# Patient Record
Sex: FEMALE | Race: WHITE | NOT HISPANIC OR LATINO | Employment: UNEMPLOYED | URBAN - METROPOLITAN AREA
[De-identification: names, ages, dates, MRNs, and addresses within clinical notes are randomized per-mention and may not be internally consistent; named-entity substitution may affect disease eponyms.]

---

## 2017-09-06 ENCOUNTER — TELEPHONE (OUTPATIENT)
Dept: GYNECOLOGIC ONCOLOGY | Facility: CLINIC | Age: 42
End: 2017-09-06

## 2017-09-06 NOTE — TELEPHONE ENCOUNTER
GYN Oncology    Telephone Call    I received a call from Rosa Larsen at [facility] regarding this patient.  She has an angiosarcoma of the breast and in the course of initial imaging, a PET scans showed several very avid pelvic masses.  She would like to refer the patient for evaluation.    I gave the information to my .  They will work her in within the next several days.    Sunshine Jones MD  09/06/17  8:56 AM

## 2017-09-07 ENCOUNTER — OFFICE VISIT (OUTPATIENT)
Dept: GYNECOLOGIC ONCOLOGY | Facility: CLINIC | Age: 42
End: 2017-09-07

## 2017-09-07 VITALS
DIASTOLIC BLOOD PRESSURE: 93 MMHG | OXYGEN SATURATION: 98 % | HEART RATE: 84 BPM | TEMPERATURE: 98.2 F | HEIGHT: 64 IN | RESPIRATION RATE: 14 BRPM | WEIGHT: 127 LBS | SYSTOLIC BLOOD PRESSURE: 172 MMHG | BODY MASS INDEX: 21.68 KG/M2

## 2017-09-07 DIAGNOSIS — N94.89 ADNEXAL MASS: ICD-10-CM

## 2017-09-07 DIAGNOSIS — C50.912 ANGIOSARCOMA OF FEMALE BREAST, LEFT (HCC): Primary | ICD-10-CM

## 2017-09-07 DIAGNOSIS — N85.8 UTERINE MASS: ICD-10-CM

## 2017-09-07 PROCEDURE — 58100 BIOPSY OF UTERUS LINING: CPT | Performed by: OBSTETRICS & GYNECOLOGY

## 2017-09-07 PROCEDURE — 88305 TISSUE EXAM BY PATHOLOGIST: CPT | Performed by: OBSTETRICS & GYNECOLOGY

## 2017-09-07 PROCEDURE — 99205 OFFICE O/P NEW HI 60 MIN: CPT | Performed by: OBSTETRICS & GYNECOLOGY

## 2017-09-07 RX ORDER — PANTOPRAZOLE SODIUM 40 MG/1
40 TABLET, DELAYED RELEASE ORAL DAILY
COMMUNITY
Start: 2017-08-25

## 2017-09-07 NOTE — PROGRESS NOTES
Marianne Rodriguez  [id number]  1975    Subjective     Reason for visit:  Patient is being seen at the request of Dr. Larsen for abnormal pelvic findings on PET in the setting of work-up for breast cancer    History of present illness:  The patient is a 42 y.o. female with abnormal pelvic findings on PET in the setting of work-up for breast cancer.  She presented in August 2017 with a palpable mass in the left breast.  Imaging revealed a 4 cm mass in the left upper outer breast.  Biopsy returned showing angiosarcoma.  PET scan showed clear lungs.  Known left breast mass with an SUV of 2.  No hypermetabolic abdominal or pelvic lymphadenopathy.  Incidental finding of a hypermetabolic lesion in the uterus with an SUV of 5 with fullness of the uterus seen on noncontrast images with a vague area of low density.  A poorly defined soft tissue density was seen in the right pelvis with an SUV of 8.  This could be related to the right ovary.  Additional pelvic imaging was recommended.  She was referred to GYN Oncology.    Today she endorses the above noted history.  She has regular menses.  No current intermenstrual bleeding.  Has chronic menorrhagia that is unchanged.  Minimal dysmenorrhea.  She does have some minimal right-sided pelvic pain.  Denies nausea vomiting.  Normal bowel and bladder function.  Normal energy and appetite.  Stable weight.  No chest pain or shortness of breath.  Changes in her skin.    OBGYN History: NSVDx2.  She underwent a LEEP in 7/2017 with Dr. Spicer for a pre-op diagnosis of CIN3.  Final pathology returned showing JUAQUIN 2-3 with focal dysplasia at the margins.  ECC was benign.    Past Medical History:   Diagnosis Date   • Breast cancer    • Current every day smoker    • GERD (gastroesophageal reflux disease)        Past Surgical History:   Procedure Laterality Date   • LAPAROSCOPIC CHOLECYSTECTOMY  2012   • TUBAL ABDOMINAL LIGATION  2002       MEDICATIONS: The current medication list was  reviewed with the patient and updated in the EMR this date per the Medical Assistant. Medication dosages and frequencies were confirmed to be accurate.      Allergies:  has No Known Allergies.    Social History: She lives in [place].  She is a student.  She has a boyfriend.  +prior 1 ppd smoker, now down to 4-5 cigs/day.  She denies use of alcohol, and illicit drugs.     Family History:  +Prostate cancer in her father at 66.  Denies a history of breast, colon, endometrial, and ovarian cancer.  No history of melanoma.    Health Maintenance:    Colonoscopy - never    Review of Systems:  CONSTITUTIONAL:  Weight has been stable.  No excessive fatigue.  No fever.  No chills, night sweats.  PSYCHIATRIC: +anxiety.  +depression. No bipolar disorder or insomnia.  EYES: Vision is unchanged.  No photophobia.  EARS, NOSE, MOUTH, AND THROAT: Hearing normal. No swallowing difficulties.  No sore throat.  ENDOCRINE: No history of diabetes.  No history of thyroid disease.  LYMPHATIC: No enlarged lymph nodes  RESPIRATORY: No shortness of breath, cough, asthma or wheezing.  No hemoptysis.  CARDIOVASCULAR: No angina, orthopnea.  No edema.  No HTN.  No hyperlipidemia.  GASTROINTESTINAL: No constipation.  No diarrhea.  No melena. +reflux.  No nausea.  No vomiting.  GENITOURINARY: No dysuria, hematuria, urgency, or frequency.  NEUROLOGIC: No numbness, weakness, motor disturbance, syncope, seizure, or headaches.  MUSCULOSKELETAL: No joint pain.  No muscle weakness.  INTEGUMENTARY: No new skin lesions.  GYNECOLOGIC: Per history of present illness.  HEMATOLOGIC: No bleeding problems.  Denies easy bruising.    Objective      Physical Exam  Vitals:    09/07/17 1404   BP: 172/93   Pulse: 84   Resp: 14   Temp: 98.2 °F (36.8 °C)   SpO2: 98%     Body mass index is 22.14 kg/(m^2).    Wt Readings from Last 3 Encounters:   09/07/17 127 lb (57.6 kg)     ECOG PS 0    GENERAL: Alert, well-appearing female in no apparent distress.  She appears her  stated age.  She is here by herself.  HEENT: Sclera anicteric, normal eyelids. Head normocephalic, atraumatic. Mucus membranes moist.  Normal dentition.    NECK: Trachea midline, supple, without masses.  No thyromegaly.      BREASTS: Deferred  CARDIOVASCULAR: Normal rate, regular rhythm, no murmurs, rubs, or gallops.  Extremities symmetric.  No peripheral edema.  RESPIRATORY: Clear to auscultation bilaterally, normal respiratory effort  GASTROINTESTINAL:  Soft, non-tender, non-distended, no rebound or guarding, no masses, or hernias.  No HSM.    MUSCULOSKELETAL:  Normal gait and station.  FROMx4.  No digital cyanosis.    SKIN:  Warm, dry, well-perfused.  All visible areas intact.  No rashes, lesions, ulcers.  PSYCHIATRIC: AO x3, with appropriate affect, normal thought processes  LYMPHATICS:  No cervical or inguinal adenopathy noted.  PELVIC exam:  Normal external female genitalia without lesions or skin changes.  Urethra midline.  Vagina without lesions.  Cervix 2 cm and without visible lesions and healing well from the LEEP.  On bimanual exam vagina and cervix are smooth and without nodularity.  No CMT.  Uterus is of normal, size, shape, and contour. No palpable abdominal masses. Rectovaginal exam confirmatory. The pelvic sidewalls are smooth, the cul de sac is clear, the rectovaginal septum is supple.  EMB Procedure:  Informed consent was obtained.  A timeout was performed for patient safety.  The cervix was prepped with Betadine and grasped at the anterior lip with a single tooth tenaculum.  Endometrial pipelle was inserted and uterus sounded to 7 cm.  An adequate but small amount of tissue was obtained on two passes and specimen was handed off to pathology.  Tenaculum was removed and hemostasis was noted.  Patient tolerated the procedure well.      Diagnostic Data:    OSH path and imaging reviewed    I personally reviewed her outside hospital PET images and will have them loaded into the system.  I concur that  there are no upper abdominal abnormalities.  There are no sagittal views of the uterus.  I reviewed the axial and coronal data sats.  On some images it appears as though the hypermetabolic activity is at the fundal portion of the endometrial cavity, and in others it appears to be in the myometrium of the fundus.  The hypermetabolism in the right adnexal region is not clearly adnexal and could be adenopathy in the iliac region.  Uterus overall does not appear enlarged.  Cervix is normal without any uptake.  The left adnexa is normal.      Assessment/Plan  This is a 42 y.o. woman with a new diagnosis of angiosarcoma of the left breast with PET scan revealing incidental findings in the pelvis of hypermetabolism in the uterus and right adnexal/right pelvic region    1.  Pelvic findings  -We reviewed the difficulty in evaluating the uterine abnormality short of surgery.  I did perform an EMB today however this is more likely a myometrial lesion and therefore not accessible by EMB.  Additionally clinically she's had no abnormal bleeding.  Will follow-up on the EMB results nonetheless.  MRI really would be best for characterizing this likely myometrial lesion.  -In terms of the right pelvic lesion, again not incredibly well imaged on CT, given that I am concerned that this could be iliac mo disease as opposed to an abnormal ovary, I strongly advise MRI over pelvic ultrasound and have ordered this today to be performed as soon as possible.    -Pending these results I also placed an order for a CA-125 to be drawn at some point.    -I explained that depending on the findings of these initial studies, we do not typically pursue interventions short of hysterectomy or oophorectomy for definitive evaluation.  In short, we do not biopsy ovaries or the uterus, but that oophorectomy and hysterectomy are the biopsy so to speak.  I the explained that at times uterine sarcomas can metastasize to the breast, although this is  typically something confined to case reports and is very uncommon.  Nonetheless I concurred that the pelvis should be excluded as the primary site, and even independently should be managed as concerning for a neoplastic process in and of itself.    2.  Breast cancer  -In reviewing Dr. Larsen's notes, typically neoadjuvant chemotherapy would be pursued followed by resection provided a good response on interval imaging.  -Treatment initiation will be on hold while the GYN issues are being evaluated.    -She may proceed with interval port placement if desired while the workup is unfolding    3.  Follow up:  -I will call her with her biopsy and MRI results and we can continue treatment planning  -I will follow-up on the loading of her PET images.  -My hope is to have a plan by early/mid next week        Electronically Signed by: Sunshine Jones MD  Date: 9/7/2017      Time:  5:30 PM    Note: Speech recognition transcription software was used to dictate portions of this document.  An attempt at proofreading has been made though minor errors in transcription may still be present.  Please do not hesitate to call our office with any questions.

## 2017-09-11 ENCOUNTER — HOSPITAL ENCOUNTER (OUTPATIENT)
Dept: MRI IMAGING | Facility: HOSPITAL | Age: 42
Discharge: HOME | End: 2017-09-11
Attending: OBSTETRICS & GYNECOLOGY | Admitting: OBSTETRICS & GYNECOLOGY

## 2017-09-11 DIAGNOSIS — N94.89 ADNEXAL MASS: ICD-10-CM

## 2017-09-11 DIAGNOSIS — C50.912 ANGIOSARCOMA OF FEMALE BREAST, LEFT (HCC): ICD-10-CM

## 2017-09-11 DIAGNOSIS — N85.8 UTERINE MASS: ICD-10-CM

## 2017-09-11 LAB
CYTO UR: NORMAL
LAB AP CASE REPORT: NORMAL
LAB AP CLINICAL INFORMATION: NORMAL
Lab: NORMAL
PATH REPORT.FINAL DX SPEC: NORMAL
PATH REPORT.GROSS SPEC: NORMAL

## 2017-09-11 PROCEDURE — A9577 INJ MULTIHANCE: HCPCS | Performed by: OBSTETRICS & GYNECOLOGY

## 2017-09-11 PROCEDURE — 72197 MRI PELVIS W/O & W/DYE: CPT

## 2017-09-11 PROCEDURE — 0 GADOBENATE DIMEGLUMINE 529 MG/ML SOLUTION: Performed by: OBSTETRICS & GYNECOLOGY

## 2017-09-11 RX ADMIN — GADOBENATE DIMEGLUMINE 10 ML: 529 INJECTION, SOLUTION INTRAVENOUS at 15:00

## 2017-09-12 ENCOUNTER — TELEPHONE (OUTPATIENT)
Dept: GYNECOLOGIC ONCOLOGY | Facility: CLINIC | Age: 42
End: 2017-09-12

## 2017-09-12 NOTE — TELEPHONE ENCOUNTER
GYN Oncology    Telephone Call    I personally reviewed the PET and MRI images with Dr. Bauer of Radiology.  We feel the uptake in the endometrium is likely physiologic given the patient's age.  Fortunately the lesion in the right adnexa is an involuting cyst well demonstrated on MRI.  The endometrium and myometrium are nonenhancing on MRI.    I recommend she proceed with her breast cancer treatment as planned.  No additional gynecologic evaluation is required apart from routine annual care.    MRI 9/11/17  FINDINGS: The uterus is of normal size. There is no uterine mass. The  endometrium is normal. There is no adnexal mass. There is no cul-de-sac  fluid. There is no pelvic mass or adenopathy. There is no abnormal  contrast enhancement.    IMPRESSION:  Normal examination.    9/7/17 EMB  Final Diagnosis    ENDOMETRIAL CURETTINGS:  Mid-secretory phase endometrium (day 20-21).  Negative for hyperplasia or malignancy.     I left the patient a voicemail to call me to discuss these results and my recommendations.    Sunshine Jones MD  09/12/17  10:25 AM      Addendum  The patient returned my call and we discussed the above plan and all questions were answered.      Sunshine Jones MD  09/12/17  1:52 PM

## 2019-05-22 ENCOUNTER — CONSULT (OUTPATIENT)
Dept: ONCOLOGY | Facility: CLINIC | Age: 44
End: 2019-05-22

## 2019-05-22 VITALS
DIASTOLIC BLOOD PRESSURE: 68 MMHG | SYSTOLIC BLOOD PRESSURE: 136 MMHG | WEIGHT: 115 LBS | HEART RATE: 80 BPM | HEIGHT: 64 IN | TEMPERATURE: 97.8 F | RESPIRATION RATE: 16 BRPM | BODY MASS INDEX: 19.63 KG/M2 | OXYGEN SATURATION: 100 %

## 2019-05-22 DIAGNOSIS — C50.912 ANGIOSARCOMA OF FEMALE BREAST, LEFT (HCC): Primary | ICD-10-CM

## 2019-05-22 DIAGNOSIS — C50.912 ANGIOSARCOMA OF LEFT FEMALE BREAST (HCC): ICD-10-CM

## 2019-05-22 PROCEDURE — 99205 OFFICE O/P NEW HI 60 MIN: CPT | Performed by: INTERNAL MEDICINE

## 2019-05-22 RX ORDER — DULOXETIN HYDROCHLORIDE 60 MG/1
CAPSULE, DELAYED RELEASE ORAL
Refills: 1 | COMMUNITY
Start: 2019-05-20

## 2019-05-22 RX ORDER — GABAPENTIN 300 MG/1
600 CAPSULE ORAL 3 TIMES DAILY
Qty: 180 CAPSULE | Refills: 3 | Status: SHIPPED | OUTPATIENT
Start: 2019-05-22 | End: 2019-09-13 | Stop reason: SDUPTHER

## 2019-05-22 RX ORDER — HYDROXYZINE HYDROCHLORIDE 25 MG/1
TABLET, FILM COATED ORAL
Refills: 1 | COMMUNITY
Start: 2019-05-20

## 2019-05-22 RX ORDER — ALBUTEROL SULFATE 90 UG/1
AEROSOL, METERED RESPIRATORY (INHALATION)
Refills: 0 | COMMUNITY
Start: 2019-05-14

## 2019-05-22 RX ORDER — LANOLIN ALCOHOL/MO/W.PET/CERES
1000 CREAM (GRAM) TOPICAL DAILY
Refills: 11 | COMMUNITY
Start: 2019-05-13

## 2019-05-22 RX ORDER — LISINOPRIL 20 MG/1
20 TABLET ORAL DAILY
Refills: 5 | COMMUNITY
Start: 2019-05-13 | End: 2019-06-18 | Stop reason: HOSPADM

## 2019-05-22 NOTE — PROGRESS NOTES
Subjective     PROBLEM LIST:  1. Angiosarcoma of the left breast  A) presented with a palpable mass on August 2017.  Mammogram 8/14/2017 showed a 2 cm left upper outer quadrant mass.  Biopsy was positive for angiosarcoma.  A breast MRI showed a 4.3 cm vascular mass.  A PET CT was done which showed abnormal uptake in the pelvis and uterus.  She was evaluated by GYN oncology with an endometrial biopsy and MRI, all of which was benign.  She underwent a left mastectomy.  Lymph nodes were uninvolved.  She received adjuvant chemotherapy with adriamycin and ifosfamide and chest wall radiation.  2.  Cervical dysplasia  3.  Gastroesophageal reflux disease    CHIEF COMPLAINT: angiosarcoma of the breast      HISTORY OF PRESENT ILLNESS:  The patient is a 43 y.o. year old female, referred for ongoing care of a history of angiosarcoma of the left breast.  Her prior treatment is as detailed above.   Since then she has been followed by Dr. Avila with scans every 3 months.     In October 2018 she had a CT scan of the chest, abdomen, and pelvis.  This was unremarkable without evidence of metastatic disease.  She does have a 1.3 cm right adrenal nodule which has been stable since 2017.    She reports that she missed too many appointments and was discharged from the prior practice.      She has neuropathy in her hands and feet and takes gabapentin for this.  She needs a refill today.      She is not working and she lives with her parents.    She recently had an MRI at  for chronic back pain and was told that there was an abnormality in her pelvis; she thinks a bone scan was recommended.    REVIEW OF SYSTEMS:  A 14 point review of systems was performed and is negative except as noted above.    Past Medical History:   Diagnosis Date   • Anxiety and depression    • Breast cancer (CMS/HCC)     Angisarcoma of the breast ~ Aug/Sept 2017 staus post Chemotherapy/Radiationtherapy   • Current every day smoker    • Gallstones    • GERD  "(gastroesophageal reflux disease)    • Hypertension          Current Outpatient Medications on File Prior to Visit   Medication Sig Dispense Refill   • albuterol sulfate  (90 Base) MCG/ACT inhaler INL 2 INHALATIONS PO QID PRF WHZ  0   • Cholecalciferol (VITAMIN D3) 5000 units capsule capsule TK 1 C PO QD  11   • DULoxetine (CYMBALTA) 60 MG capsule TK ONE C PO QD  1   • hydrOXYzine (ATARAX) 25 MG tablet TK 1 T PO QHS  1   • lisinopril (PRINIVIL,ZESTRIL) 20 MG tablet Take  by mouth Daily.  5   • pantoprazole (PROTONIX) 40 MG EC tablet      • vitamin B-12 (CYANOCOBALAMIN) 1000 MCG tablet Take 1,000 mcg by mouth Daily.  11     No current facility-administered medications on file prior to visit.        No Known Allergies    Past Surgical History:   Procedure Laterality Date   • LAPAROSCOPIC CHOLECYSTECTOMY  2012   • LEEP  05/2017   • MASTECTOMY Left 09/2017   • TUBAL ABDOMINAL LIGATION  2002       Social History     Socioeconomic History   • Marital status:      Spouse name: Not on file   • Number of children: Not on file   • Years of education: Not on file   • Highest education level: Not on file   Tobacco Use   • Smoking status: Current Every Day Smoker     Packs/day: 0.50     Years: 20.00     Pack years: 10.00     Types: Cigarettes   • Smokeless tobacco: Never Used   Substance and Sexual Activity   • Alcohol use: No   • Drug use: No   • Sexual activity: Defer       Family History   Problem Relation Age of Onset   • Hypertension Mother    • Hyperlipidemia Mother    • Emphysema Mother    • Hodgkin's lymphoma Father         Non   • Prostate cancer Father    • Bone cancer Father        Objective     /68 Comment: RUE  Pulse 80   Temp 97.8 °F (36.6 °C) (Temporal)   Resp 16   Ht 161.3 cm (63.5\")   Wt 52.2 kg (115 lb)   SpO2 100% Comment: RA  BMI 20.05 kg/m²   Performance Status: 0  General: well appearing female in no acute distress  Neuro: alert and oriented  HEENT: sclera anicteric, oropharynx " clear  Lymphatics: no cervical, supraclavicular, or axillary adenopathy  Chest: s/p left mastectomy, chest tender, no palpable masses  Cardiovascular: regular rate and rhythm, no murmurs  Lungs: clear to auscultation bilaterally  Abdomen: soft, nontender, nondistended.  No palpable organomegaly  Extremeties: no lower extremity edema  Skin: no rashes, lesions, bruising, or petechiae  Psych: mood and affect appropriate        Imaging: I personally reviewed her prior PET/CT from 2017, MRI pelvis from 2017, and recent MRI lumbar spine, and also reviewed images with radiology.  There are some bilateral pelvic bone lesions that are enhancing, and also a cystic appearing mass in the right pelvic sidewall.    Assessment/Plan     Marianne Rodriguez is a 43 y.o. year old female with a history of angiosarcoma of the left breast.  She is here today to transfer care.    There are some concerning findings on her recent MRI of the lumbar spine.  There are some pelvic bone lesions which could represent metastases, and were not present on her imaging from 2 years ago.  There is also a cystic lesion in the right pelvic sidewall of uncertain significance.  I will order a CT scan of the chest abdomen and pelvis as well as a bone scan for further evaluation of this.  Her previous tumor was not extremely FDG avid, so it is uncertain that a PET scan would be beneficial.    I discussed all of the above with Ms. Rodriguez during and after her visit.  I will contact her when the results of the scans are available and we will make further plans from there.    Visit time 60 minutes , 45 minutes spent in counseling patient on imaging findings, review of imaging with other physicians, and discussion of plans.           Charlotte Mann MD    5/22/2019

## 2019-05-31 ENCOUNTER — HOSPITAL ENCOUNTER (OUTPATIENT)
Dept: CT IMAGING | Facility: HOSPITAL | Age: 44
Discharge: HOME | End: 2019-05-31
Admitting: INTERNAL MEDICINE

## 2019-05-31 ENCOUNTER — HOSPITAL ENCOUNTER (OUTPATIENT)
Dept: NUCLEAR MEDICINE | Facility: HOSPITAL | Age: 44
Discharge: HOME | End: 2019-05-31

## 2019-05-31 DIAGNOSIS — C50.912 ANGIOSARCOMA OF FEMALE BREAST, LEFT (HCC): ICD-10-CM

## 2019-05-31 PROCEDURE — 0 TECHNETIUM MEDRONATE KIT: Performed by: INTERNAL MEDICINE

## 2019-05-31 PROCEDURE — 82565 ASSAY OF CREATININE: CPT

## 2019-05-31 PROCEDURE — 78306 BONE IMAGING WHOLE BODY: CPT

## 2019-05-31 PROCEDURE — 25010000002 IOPAMIDOL 61 % SOLUTION: Performed by: INTERNAL MEDICINE

## 2019-05-31 PROCEDURE — 74177 CT ABD & PELVIS W/CONTRAST: CPT

## 2019-05-31 PROCEDURE — A9503 TC99M MEDRONATE: HCPCS | Performed by: INTERNAL MEDICINE

## 2019-05-31 PROCEDURE — 71260 CT THORAX DX C+: CPT

## 2019-05-31 RX ORDER — TC 99M MEDRONATE 20 MG/10ML
26.2 INJECTION, POWDER, LYOPHILIZED, FOR SOLUTION INTRAVENOUS
Status: COMPLETED | OUTPATIENT
Start: 2019-05-31 | End: 2019-05-31

## 2019-05-31 RX ADMIN — IOPAMIDOL 75 ML: 612 INJECTION, SOLUTION INTRAVENOUS at 12:16

## 2019-05-31 RX ADMIN — Medication 26.2 MILLICURIE: at 11:15

## 2019-06-04 ENCOUNTER — TELEPHONE (OUTPATIENT)
Dept: ONCOLOGY | Facility: CLINIC | Age: 44
End: 2019-06-04

## 2019-06-04 DIAGNOSIS — R19.00 PELVIC MASS: ICD-10-CM

## 2019-06-04 DIAGNOSIS — C50.912 ANGIOSARCOMA OF LEFT FEMALE BREAST (HCC): Primary | ICD-10-CM

## 2019-06-04 NOTE — TELEPHONE ENCOUNTER
Called patient regarding scan results.  The scans are fairly unremarkable other than a approximately 5 cm right pelvic sidewall mass.  Discussed with Dr. oJ.  He will see her tomorrow for further evaluation.

## 2019-06-05 LAB — CREAT BLDA-MCNC: 0.7 MG/DL (ref 0.6–1.3)

## 2019-06-11 ENCOUNTER — APPOINTMENT (OUTPATIENT)
Dept: PREADMISSION TESTING | Facility: HOSPITAL | Age: 44
End: 2019-06-11

## 2019-06-11 VITALS — HEIGHT: 64 IN | WEIGHT: 117.06 LBS | BODY MASS INDEX: 19.99 KG/M2

## 2019-06-11 LAB
ABO GROUP BLD: NORMAL
ANION GAP SERPL CALCULATED.3IONS-SCNC: 14 MMOL/L
BLD GP AB SCN SERPL QL: NEGATIVE
BUN BLD-MCNC: 11 MG/DL (ref 6–20)
BUN/CREAT SERPL: 20.4 (ref 7–25)
CALCIUM SPEC-SCNC: 9.5 MG/DL (ref 8.6–10.5)
CHLORIDE SERPL-SCNC: 101 MMOL/L (ref 98–107)
CO2 SERPL-SCNC: 26 MMOL/L (ref 22–29)
CREAT BLD-MCNC: 0.54 MG/DL (ref 0.57–1)
DEPRECATED RDW RBC AUTO: 52.5 FL (ref 37–54)
ERYTHROCYTE [DISTWIDTH] IN BLOOD BY AUTOMATED COUNT: 14.6 % (ref 12.3–15.4)
GFR SERPL CREATININE-BSD FRML MDRD: 124 ML/MIN/1.73
GLUCOSE BLD-MCNC: 96 MG/DL (ref 65–99)
HBA1C MFR BLD: 5.1 % (ref 4.8–5.6)
HCT VFR BLD AUTO: 38.2 % (ref 34–46.6)
HGB BLD-MCNC: 11.6 G/DL (ref 12–15.9)
MCH RBC QN AUTO: 29.7 PG (ref 26.6–33)
MCHC RBC AUTO-ENTMCNC: 30.4 G/DL (ref 31.5–35.7)
MCV RBC AUTO: 97.7 FL (ref 79–97)
PLATELET # BLD AUTO: 518 10*3/MM3 (ref 140–450)
PMV BLD AUTO: 8.6 FL (ref 6–12)
POTASSIUM BLD-SCNC: 4.5 MMOL/L (ref 3.5–5.2)
RBC # BLD AUTO: 3.91 10*6/MM3 (ref 3.77–5.28)
RH BLD: POSITIVE
SODIUM BLD-SCNC: 141 MMOL/L (ref 136–145)
T&S EXPIRATION DATE: NORMAL
WBC NRBC COR # BLD: 10.27 10*3/MM3 (ref 3.4–10.8)

## 2019-06-11 PROCEDURE — 85027 COMPLETE CBC AUTOMATED: CPT | Performed by: SURGERY

## 2019-06-11 PROCEDURE — 83036 HEMOGLOBIN GLYCOSYLATED A1C: CPT | Performed by: SURGERY

## 2019-06-11 PROCEDURE — 86850 RBC ANTIBODY SCREEN: CPT | Performed by: SURGERY

## 2019-06-11 PROCEDURE — 80048 BASIC METABOLIC PNL TOTAL CA: CPT | Performed by: SURGERY

## 2019-06-11 PROCEDURE — 93005 ELECTROCARDIOGRAM TRACING: CPT

## 2019-06-11 PROCEDURE — 86901 BLOOD TYPING SEROLOGIC RH(D): CPT | Performed by: SURGERY

## 2019-06-11 PROCEDURE — 86900 BLOOD TYPING SEROLOGIC ABO: CPT | Performed by: SURGERY

## 2019-06-11 PROCEDURE — 93010 ELECTROCARDIOGRAM REPORT: CPT | Performed by: INTERNAL MEDICINE

## 2019-06-11 PROCEDURE — 36415 COLL VENOUS BLD VENIPUNCTURE: CPT

## 2019-06-11 RX ORDER — PREGABALIN 75 MG/1
75 CAPSULE ORAL ONCE
Status: CANCELLED | OUTPATIENT
Start: 2019-06-11 | End: 2019-06-11

## 2019-06-11 RX ORDER — ACETAMINOPHEN 500 MG
1000 TABLET ORAL ONCE
Status: CANCELLED | OUTPATIENT
Start: 2019-06-11 | End: 2019-06-11

## 2019-06-11 RX ORDER — SCOLOPAMINE TRANSDERMAL SYSTEM 1 MG/1
1 PATCH, EXTENDED RELEASE TRANSDERMAL CONTINUOUS
Status: CANCELLED | OUTPATIENT
Start: 2019-06-11 | End: 2019-06-14

## 2019-06-11 RX ORDER — MELOXICAM 15 MG/1
15 TABLET ORAL ONCE
Status: CANCELLED | OUTPATIENT
Start: 2019-06-11 | End: 2019-06-11

## 2019-06-11 NOTE — DISCHARGE INSTRUCTIONS
The following information and instructions were given:    Do not eat, drink, smoke or chew gum after midnight the night before surgery. This also includes no mints.  Take all routine, prescribed medications including heart and blood pressure medicines with a sip of water unless otherwise instructed by your physician.   Do NOT take diabetic medication unless instructed by your physician.    If you were instructed to drink 20 ounces (or until full) of Gatorade or G2 (if diabetic) the morning of surgery, the Gatorade or G2 must be completed 1 hour before arrival time on the day of surgery .  No RED Gatorade or G2.      DO NOT shave for two days before your procedure.  Do not wear makeup.      DO NOT wear fingernail polish (gel/regular) and/or acrylic/artificial nails on the day of surgery.   If you have had a recent manicure and would rather not remove polish or artificial nails, then the minimum requirement is that the polish/artificial nails must be removed from the middle finger on each hand.      If you are having surgery/procedure on an upper extremity, then fingernail polish/artificial fingernails must be removed for surgery.  NO EXCEPTIONS.      If you are having surgery/procedure on a lower extremity, then toenail polish on both extremities must be removed for surgery.  NO EXCEPTIONS.    Remove all jewelry (advise to go to jeweler if unable to remove).  Jewelry, especially rings, can no longer be taped for surgery.    Leave anything you consider valuable at home.    Leave your suitcase in the car until after your surgery.    Bring the following with you the day of your procedure (when applicable)       -picture ID and insurance cards       -Co-pay/deductible required by insurance       -Medications in the original bottles (not a list) including all over-the-counter medications if not brought to PAT       -Copy of advance directive, living will or power of  documents if not brought to PAT       -CPAP or  BIPAP mask and tubing (do not bring machine)       -Skin prep instruction(s) sheet       -PAT Pass    Education booklet, brochure, handout or binder related to procedure given to patient.    When applicable, an ERAS booklet was given to patient.    Pain Control After Surgery handout given to patient.    Respirex use (handout given to patient) and pneumonia prevention education provided.    Signs and Symptoms of infection discussed.    DVT Prevention education given.  Stressing the importance of ambulation.    Please apply Chlorhexadine wipes to surgical area (if instructed) the night before procedure and the AM of procedure and document date/time of applications on skin prep instruction sheet.        Abdominal mass resection

## 2019-06-12 ENCOUNTER — ANESTHESIA EVENT (OUTPATIENT)
Dept: PERIOP | Facility: HOSPITAL | Age: 44
End: 2019-06-12

## 2019-06-12 RX ORDER — FAMOTIDINE 10 MG/ML
20 INJECTION, SOLUTION INTRAVENOUS ONCE
Status: CANCELLED | OUTPATIENT
Start: 2019-06-12 | End: 2019-06-12

## 2019-06-13 ENCOUNTER — HOSPITAL ENCOUNTER (INPATIENT)
Facility: HOSPITAL | Age: 44
LOS: 5 days | Discharge: HOME | End: 2019-06-18
Attending: SURGERY | Admitting: SURGERY

## 2019-06-13 ENCOUNTER — ANESTHESIA (OUTPATIENT)
Dept: PERIOP | Facility: HOSPITAL | Age: 44
End: 2019-06-13

## 2019-06-13 DIAGNOSIS — R19.00 RETROPERITONEAL MASS: ICD-10-CM

## 2019-06-13 PROBLEM — C78.6 SECONDARY MALIGNANT NEOPLASM OF RETROPERITONEUM AND PERITONEUM (HCC): Status: ACTIVE | Noted: 2019-06-13

## 2019-06-13 LAB
B-HCG UR QL: NEGATIVE
INTERNAL NEGATIVE CONTROL: NEGATIVE
INTERNAL POSITIVE CONTROL: POSITIVE
Lab: NORMAL

## 2019-06-13 PROCEDURE — 25010000002 HYDROMORPHONE PER 4 MG: Performed by: ANESTHESIOLOGY

## 2019-06-13 PROCEDURE — 25010000002 HEPARIN (PORCINE) PER 1000 UNITS: Performed by: SURGERY

## 2019-06-13 PROCEDURE — 25010000002 PROPOFOL 10 MG/ML EMULSION: Performed by: NURSE ANESTHETIST, CERTIFIED REGISTERED

## 2019-06-13 PROCEDURE — 25010000003 CEFAZOLIN IN DEXTROSE 2-4 GM/100ML-% SOLUTION: Performed by: SURGERY

## 2019-06-13 PROCEDURE — 25010000002 NEOSTIGMINE 10 MG/10ML SOLUTION: Performed by: NURSE ANESTHETIST, CERTIFIED REGISTERED

## 2019-06-13 PROCEDURE — 25010000002 PHENYLEPHRINE PER 1 ML: Performed by: NURSE ANESTHETIST, CERTIFIED REGISTERED

## 2019-06-13 PROCEDURE — 94640 AIRWAY INHALATION TREATMENT: CPT

## 2019-06-13 PROCEDURE — 81025 URINE PREGNANCY TEST: CPT | Performed by: SURGERY

## 2019-06-13 PROCEDURE — 25010000002 FENTANYL CITRATE (PF) 100 MCG/2ML SOLUTION: Performed by: NURSE ANESTHETIST, CERTIFIED REGISTERED

## 2019-06-13 PROCEDURE — 25010000002 DEXAMETHASONE PER 1 MG: Performed by: NURSE ANESTHETIST, CERTIFIED REGISTERED

## 2019-06-13 PROCEDURE — 94799 UNLISTED PULMONARY SVC/PX: CPT

## 2019-06-13 PROCEDURE — 88331 PATH CONSLTJ SURG 1 BLK 1SPC: CPT | Performed by: SURGERY

## 2019-06-13 PROCEDURE — 25010000002 HYDROMORPHONE PER 4 MG: Performed by: NURSE ANESTHETIST, CERTIFIED REGISTERED

## 2019-06-13 PROCEDURE — 0WBH0ZZ EXCISION OF RETROPERITONEUM, OPEN APPROACH: ICD-10-PCS | Performed by: SURGERY

## 2019-06-13 PROCEDURE — 88305 TISSUE EXAM BY PATHOLOGIST: CPT | Performed by: SURGERY

## 2019-06-13 PROCEDURE — 25010000002 DEXAMETHASONE SODIUM PHOSPHATE 10 MG/ML SOLUTION: Performed by: ANESTHESIOLOGY

## 2019-06-13 PROCEDURE — 25010000002 BUPRENORPHINE PER 0.1 MG: Performed by: ANESTHESIOLOGY

## 2019-06-13 PROCEDURE — 25010000002 HYDROMORPHONE PER 4 MG: Performed by: SURGERY

## 2019-06-13 PROCEDURE — 25010000002 FENTANYL CITRATE (PF) 100 MCG/2ML SOLUTION: Performed by: ANESTHESIOLOGY

## 2019-06-13 PROCEDURE — 25010000002 PIPERACILLIN SOD-TAZOBACTAM PER 1 G: Performed by: SURGERY

## 2019-06-13 PROCEDURE — 88307 TISSUE EXAM BY PATHOLOGIST: CPT | Performed by: SURGERY

## 2019-06-13 PROCEDURE — 25010000002 MIDAZOLAM PER 1 MG: Performed by: ANESTHESIOLOGY

## 2019-06-13 RX ORDER — ESMOLOL HYDROCHLORIDE 10 MG/ML
INJECTION INTRAVENOUS AS NEEDED
Status: DISCONTINUED | OUTPATIENT
Start: 2019-06-13 | End: 2019-06-13 | Stop reason: SURG

## 2019-06-13 RX ORDER — HYDROXYZINE HYDROCHLORIDE 25 MG/1
25 TABLET, FILM COATED ORAL NIGHTLY PRN
Status: DISCONTINUED | OUTPATIENT
Start: 2019-06-13 | End: 2019-06-18 | Stop reason: HOSPADM

## 2019-06-13 RX ORDER — SCOLOPAMINE TRANSDERMAL SYSTEM 1 MG/1
1 PATCH, EXTENDED RELEASE TRANSDERMAL CONTINUOUS
Status: DISCONTINUED | OUTPATIENT
Start: 2019-06-13 | End: 2019-06-13 | Stop reason: HOSPADM

## 2019-06-13 RX ORDER — DULOXETIN HYDROCHLORIDE 60 MG/1
60 CAPSULE, DELAYED RELEASE ORAL DAILY
Status: DISCONTINUED | OUTPATIENT
Start: 2019-06-14 | End: 2019-06-18 | Stop reason: HOSPADM

## 2019-06-13 RX ORDER — OXYCODONE AND ACETAMINOPHEN 10; 325 MG/1; MG/1
1 TABLET ORAL EVERY 4 HOURS PRN
Status: DISCONTINUED | OUTPATIENT
Start: 2019-06-13 | End: 2019-06-18

## 2019-06-13 RX ORDER — SODIUM CHLORIDE 0.9 % (FLUSH) 0.9 %
3-10 SYRINGE (ML) INJECTION AS NEEDED
Status: DISCONTINUED | OUTPATIENT
Start: 2019-06-13 | End: 2019-06-13 | Stop reason: HOSPADM

## 2019-06-13 RX ORDER — ONDANSETRON 2 MG/ML
4 INJECTION INTRAMUSCULAR; INTRAVENOUS ONCE AS NEEDED
Status: DISCONTINUED | OUTPATIENT
Start: 2019-06-13 | End: 2019-06-13 | Stop reason: HOSPADM

## 2019-06-13 RX ORDER — GABAPENTIN 300 MG/1
600 CAPSULE ORAL EVERY 8 HOURS SCHEDULED
Status: DISCONTINUED | OUTPATIENT
Start: 2019-06-13 | End: 2019-06-18 | Stop reason: HOSPADM

## 2019-06-13 RX ORDER — SENNA AND DOCUSATE SODIUM 50; 8.6 MG/1; MG/1
2 TABLET, FILM COATED ORAL 2 TIMES DAILY
Status: DISCONTINUED | OUTPATIENT
Start: 2019-06-13 | End: 2019-06-18 | Stop reason: HOSPADM

## 2019-06-13 RX ORDER — ALVIMOPAN 12 MG/1
12 CAPSULE ORAL ONCE
Status: DISCONTINUED | OUTPATIENT
Start: 2019-06-13 | End: 2019-06-13 | Stop reason: HOSPADM

## 2019-06-13 RX ORDER — ACETAMINOPHEN 325 MG/1
650 TABLET ORAL EVERY 4 HOURS PRN
Status: DISCONTINUED | OUTPATIENT
Start: 2019-06-13 | End: 2019-06-18 | Stop reason: HOSPADM

## 2019-06-13 RX ORDER — MELOXICAM 15 MG/1
15 TABLET ORAL ONCE
Status: COMPLETED | OUTPATIENT
Start: 2019-06-13 | End: 2019-06-13

## 2019-06-13 RX ORDER — CELECOXIB 200 MG/1
200 CAPSULE ORAL EVERY 12 HOURS SCHEDULED
Status: DISCONTINUED | OUTPATIENT
Start: 2019-06-13 | End: 2019-06-14

## 2019-06-13 RX ORDER — ACETAMINOPHEN 650 MG/1
650 SUPPOSITORY RECTAL EVERY 4 HOURS PRN
Status: DISCONTINUED | OUTPATIENT
Start: 2019-06-13 | End: 2019-06-18 | Stop reason: HOSPADM

## 2019-06-13 RX ORDER — ALBUTEROL SULFATE 2.5 MG/3ML
2.5 SOLUTION RESPIRATORY (INHALATION)
Status: DISCONTINUED | OUTPATIENT
Start: 2019-06-13 | End: 2019-06-15

## 2019-06-13 RX ORDER — NALOXONE HCL 0.4 MG/ML
0.1 VIAL (ML) INJECTION
Status: DISCONTINUED | OUTPATIENT
Start: 2019-06-13 | End: 2019-06-14

## 2019-06-13 RX ORDER — LIDOCAINE HYDROCHLORIDE 10 MG/ML
0.5 INJECTION, SOLUTION EPIDURAL; INFILTRATION; INTRACAUDAL; PERINEURAL ONCE AS NEEDED
Status: COMPLETED | OUTPATIENT
Start: 2019-06-13 | End: 2019-06-13

## 2019-06-13 RX ORDER — FENTANYL CITRATE 50 UG/ML
50 INJECTION, SOLUTION INTRAMUSCULAR; INTRAVENOUS
Status: DISCONTINUED | OUTPATIENT
Start: 2019-06-13 | End: 2019-06-13

## 2019-06-13 RX ORDER — HYDROMORPHONE HYDROCHLORIDE 1 MG/ML
0.5 INJECTION, SOLUTION INTRAMUSCULAR; INTRAVENOUS; SUBCUTANEOUS
Status: DISCONTINUED | OUTPATIENT
Start: 2019-06-13 | End: 2019-06-14

## 2019-06-13 RX ORDER — HEPARIN SODIUM 5000 [USP'U]/ML
5000 INJECTION, SOLUTION INTRAVENOUS; SUBCUTANEOUS
Status: COMPLETED | OUTPATIENT
Start: 2019-06-13 | End: 2019-06-13

## 2019-06-13 RX ORDER — HYDROMORPHONE HYDROCHLORIDE 1 MG/ML
0.5 INJECTION, SOLUTION INTRAMUSCULAR; INTRAVENOUS; SUBCUTANEOUS
Status: COMPLETED | OUTPATIENT
Start: 2019-06-13 | End: 2019-06-13

## 2019-06-13 RX ORDER — BUPIVACAINE HYDROCHLORIDE 2.5 MG/ML
INJECTION, SOLUTION EPIDURAL; INFILTRATION; INTRACAUDAL
Status: COMPLETED | OUTPATIENT
Start: 2019-06-13 | End: 2019-06-13

## 2019-06-13 RX ORDER — OXYCODONE HYDROCHLORIDE AND ACETAMINOPHEN 5; 325 MG/1; MG/1
1 TABLET ORAL EVERY 4 HOURS PRN
Status: DISCONTINUED | OUTPATIENT
Start: 2019-06-13 | End: 2019-06-18

## 2019-06-13 RX ORDER — ACETAMINOPHEN 160 MG/5ML
650 SOLUTION ORAL EVERY 4 HOURS PRN
Status: DISCONTINUED | OUTPATIENT
Start: 2019-06-13 | End: 2019-06-18 | Stop reason: HOSPADM

## 2019-06-13 RX ORDER — FENTANYL CITRATE 50 UG/ML
50 INJECTION, SOLUTION INTRAMUSCULAR; INTRAVENOUS
Status: COMPLETED | OUTPATIENT
Start: 2019-06-13 | End: 2019-06-13

## 2019-06-13 RX ORDER — MIDAZOLAM HYDROCHLORIDE 1 MG/ML
2 INJECTION INTRAMUSCULAR; INTRAVENOUS ONCE
Status: COMPLETED | OUTPATIENT
Start: 2019-06-13 | End: 2019-06-13

## 2019-06-13 RX ORDER — SODIUM CHLORIDE, SODIUM LACTATE, POTASSIUM CHLORIDE, CALCIUM CHLORIDE 600; 310; 30; 20 MG/100ML; MG/100ML; MG/100ML; MG/100ML
9 INJECTION, SOLUTION INTRAVENOUS CONTINUOUS
Status: DISCONTINUED | OUTPATIENT
Start: 2019-06-13 | End: 2019-06-13 | Stop reason: HOSPADM

## 2019-06-13 RX ORDER — HYDROMORPHONE HYDROCHLORIDE 1 MG/ML
0.5 INJECTION, SOLUTION INTRAMUSCULAR; INTRAVENOUS; SUBCUTANEOUS
Status: DISCONTINUED | OUTPATIENT
Start: 2019-06-13 | End: 2019-06-13 | Stop reason: HOSPADM

## 2019-06-13 RX ORDER — PROPOFOL 10 MG/ML
VIAL (ML) INTRAVENOUS AS NEEDED
Status: DISCONTINUED | OUTPATIENT
Start: 2019-06-13 | End: 2019-06-13 | Stop reason: SURG

## 2019-06-13 RX ORDER — BUPRENORPHINE HYDROCHLORIDE 0.32 MG/ML
INJECTION INTRAMUSCULAR; INTRAVENOUS
Status: COMPLETED | OUTPATIENT
Start: 2019-06-13 | End: 2019-06-13

## 2019-06-13 RX ORDER — SODIUM CHLORIDE 9 MG/ML
75 INJECTION, SOLUTION INTRAVENOUS CONTINUOUS
Status: DISCONTINUED | OUTPATIENT
Start: 2019-06-13 | End: 2019-06-14

## 2019-06-13 RX ORDER — DEXAMETHASONE SODIUM PHOSPHATE 10 MG/ML
INJECTION, SOLUTION INTRAMUSCULAR; INTRAVENOUS
Status: COMPLETED | OUTPATIENT
Start: 2019-06-13 | End: 2019-06-13

## 2019-06-13 RX ORDER — SODIUM CHLORIDE 0.9 % (FLUSH) 0.9 %
3 SYRINGE (ML) INJECTION EVERY 12 HOURS SCHEDULED
Status: DISCONTINUED | OUTPATIENT
Start: 2019-06-13 | End: 2019-06-13 | Stop reason: HOSPADM

## 2019-06-13 RX ORDER — FAMOTIDINE 20 MG/1
20 TABLET, FILM COATED ORAL ONCE
Status: DISCONTINUED | OUTPATIENT
Start: 2019-06-13 | End: 2019-06-13 | Stop reason: HOSPADM

## 2019-06-13 RX ORDER — GLYCOPYRROLATE 0.2 MG/ML
INJECTION INTRAMUSCULAR; INTRAVENOUS AS NEEDED
Status: DISCONTINUED | OUTPATIENT
Start: 2019-06-13 | End: 2019-06-13 | Stop reason: SURG

## 2019-06-13 RX ORDER — ONDANSETRON 4 MG/1
4 TABLET, FILM COATED ORAL EVERY 6 HOURS PRN
Status: DISCONTINUED | OUTPATIENT
Start: 2019-06-13 | End: 2019-06-18 | Stop reason: HOSPADM

## 2019-06-13 RX ORDER — NEOSTIGMINE METHYLSULFATE 1 MG/ML
INJECTION, SOLUTION INTRAVENOUS AS NEEDED
Status: DISCONTINUED | OUTPATIENT
Start: 2019-06-13 | End: 2019-06-13 | Stop reason: SURG

## 2019-06-13 RX ORDER — PROMETHAZINE HYDROCHLORIDE 25 MG/ML
12.5 INJECTION, SOLUTION INTRAMUSCULAR; INTRAVENOUS EVERY 6 HOURS PRN
Status: DISCONTINUED | OUTPATIENT
Start: 2019-06-13 | End: 2019-06-18 | Stop reason: HOSPADM

## 2019-06-13 RX ORDER — PREGABALIN 75 MG/1
75 CAPSULE ORAL ONCE
Status: DISCONTINUED | OUTPATIENT
Start: 2019-06-13 | End: 2019-06-13

## 2019-06-13 RX ORDER — ACETAMINOPHEN 500 MG
1000 TABLET ORAL ONCE
Status: COMPLETED | OUTPATIENT
Start: 2019-06-13 | End: 2019-06-13

## 2019-06-13 RX ORDER — ONDANSETRON 2 MG/ML
4 INJECTION INTRAMUSCULAR; INTRAVENOUS EVERY 6 HOURS PRN
Status: DISCONTINUED | OUTPATIENT
Start: 2019-06-13 | End: 2019-06-18 | Stop reason: HOSPADM

## 2019-06-13 RX ORDER — PANTOPRAZOLE SODIUM 40 MG/1
40 TABLET, DELAYED RELEASE ORAL
Status: DISCONTINUED | OUTPATIENT
Start: 2019-06-14 | End: 2019-06-18 | Stop reason: HOSPADM

## 2019-06-13 RX ORDER — KETAMINE HCL IN NACL, ISO-OSM 100MG/10ML
15 SYRINGE (ML) INJECTION ONCE
Status: COMPLETED | OUTPATIENT
Start: 2019-06-13 | End: 2019-06-13

## 2019-06-13 RX ORDER — BISACODYL 10 MG
10 SUPPOSITORY, RECTAL RECTAL DAILY PRN
Status: DISCONTINUED | OUTPATIENT
Start: 2019-06-13 | End: 2019-06-18 | Stop reason: HOSPADM

## 2019-06-13 RX ORDER — LIDOCAINE HYDROCHLORIDE 10 MG/ML
INJECTION, SOLUTION EPIDURAL; INFILTRATION; INTRACAUDAL; PERINEURAL AS NEEDED
Status: DISCONTINUED | OUTPATIENT
Start: 2019-06-13 | End: 2019-06-13 | Stop reason: SURG

## 2019-06-13 RX ORDER — LISINOPRIL 20 MG/1
20 TABLET ORAL DAILY
Status: DISCONTINUED | OUTPATIENT
Start: 2019-06-13 | End: 2019-06-14

## 2019-06-13 RX ORDER — DEXAMETHASONE SODIUM PHOSPHATE 4 MG/ML
INJECTION, SOLUTION INTRA-ARTICULAR; INTRALESIONAL; INTRAMUSCULAR; INTRAVENOUS; SOFT TISSUE AS NEEDED
Status: DISCONTINUED | OUTPATIENT
Start: 2019-06-13 | End: 2019-06-13 | Stop reason: SURG

## 2019-06-13 RX ORDER — HYDROMORPHONE HYDROCHLORIDE 1 MG/ML
0.5 INJECTION, SOLUTION INTRAMUSCULAR; INTRAVENOUS; SUBCUTANEOUS
Status: DISCONTINUED | OUTPATIENT
Start: 2019-06-13 | End: 2019-06-13

## 2019-06-13 RX ORDER — CEFAZOLIN SODIUM 2 G/100ML
2 INJECTION, SOLUTION INTRAVENOUS
Status: COMPLETED | OUTPATIENT
Start: 2019-06-13 | End: 2019-06-13

## 2019-06-13 RX ORDER — ROCURONIUM BROMIDE 10 MG/ML
INJECTION, SOLUTION INTRAVENOUS AS NEEDED
Status: DISCONTINUED | OUTPATIENT
Start: 2019-06-13 | End: 2019-06-13 | Stop reason: SURG

## 2019-06-13 RX ADMIN — MIDAZOLAM HYDROCHLORIDE 2 MG: 1 INJECTION, SOLUTION INTRAMUSCULAR; INTRAVENOUS at 16:50

## 2019-06-13 RX ADMIN — ALBUTEROL SULFATE 2.5 MG: 2.5 SOLUTION RESPIRATORY (INHALATION) at 20:19

## 2019-06-13 RX ADMIN — SODIUM CHLORIDE, POTASSIUM CHLORIDE, SODIUM LACTATE AND CALCIUM CHLORIDE: 600; 310; 30; 20 INJECTION, SOLUTION INTRAVENOUS at 15:11

## 2019-06-13 RX ADMIN — CEFAZOLIN SODIUM 2 G: 2 INJECTION, SOLUTION INTRAVENOUS at 14:12

## 2019-06-13 RX ADMIN — HYDROMORPHONE HYDROCHLORIDE 0.5 MG: 1 INJECTION, SOLUTION INTRAMUSCULAR; INTRAVENOUS; SUBCUTANEOUS at 16:12

## 2019-06-13 RX ADMIN — SODIUM CHLORIDE, POTASSIUM CHLORIDE, SODIUM LACTATE AND CALCIUM CHLORIDE 9 ML/HR: 600; 310; 30; 20 INJECTION, SOLUTION INTRAVENOUS at 12:26

## 2019-06-13 RX ADMIN — PHENYLEPHRINE HYDROCHLORIDE 80 MCG: 10 INJECTION INTRAVENOUS at 14:40

## 2019-06-13 RX ADMIN — FENTANYL CITRATE 50 MCG: 50 INJECTION INTRAMUSCULAR; INTRAVENOUS at 16:07

## 2019-06-13 RX ADMIN — FENTANYL CITRATE 50 MCG: 50 INJECTION INTRAMUSCULAR; INTRAVENOUS at 15:53

## 2019-06-13 RX ADMIN — ROCURONIUM BROMIDE 50 MG: 10 INJECTION INTRAVENOUS at 14:16

## 2019-06-13 RX ADMIN — FENTANYL CITRATE 50 MCG: 50 INJECTION INTRAMUSCULAR; INTRAVENOUS at 15:42

## 2019-06-13 RX ADMIN — OXYCODONE HYDROCHLORIDE AND ACETAMINOPHEN 1 TABLET: 10; 325 TABLET ORAL at 20:10

## 2019-06-13 RX ADMIN — ACETAMINOPHEN 1000 MG: 500 TABLET ORAL at 12:24

## 2019-06-13 RX ADMIN — DEXAMETHASONE SODIUM PHOSPHATE 8 MG: 4 INJECTION, SOLUTION INTRAMUSCULAR; INTRAVENOUS at 14:21

## 2019-06-13 RX ADMIN — HYDROMORPHONE HYDROCHLORIDE 0.5 MG: 1 INJECTION, SOLUTION INTRAMUSCULAR; INTRAVENOUS; SUBCUTANEOUS at 23:17

## 2019-06-13 RX ADMIN — LISINOPRIL 20 MG: 20 TABLET ORAL at 18:50

## 2019-06-13 RX ADMIN — SENNOSIDES,DOCUSATE SODIUM 2 TABLET: 8.6; 5 TABLET, FILM COATED ORAL at 20:10

## 2019-06-13 RX ADMIN — FENTANYL CITRATE 50 MCG: 50 INJECTION INTRAMUSCULAR; INTRAVENOUS at 16:19

## 2019-06-13 RX ADMIN — HYDROMORPHONE HYDROCHLORIDE 0.5 MG: 1 INJECTION, SOLUTION INTRAMUSCULAR; INTRAVENOUS; SUBCUTANEOUS at 16:40

## 2019-06-13 RX ADMIN — HYDROMORPHONE HYDROCHLORIDE 0.5 MG: 1 INJECTION, SOLUTION INTRAMUSCULAR; INTRAVENOUS; SUBCUTANEOUS at 16:50

## 2019-06-13 RX ADMIN — MELOXICAM 15 MG: 15 TABLET ORAL at 12:24

## 2019-06-13 RX ADMIN — LIDOCAINE HYDROCHLORIDE 50 MG: 10 INJECTION, SOLUTION EPIDURAL; INFILTRATION; INTRACAUDAL; PERINEURAL at 14:16

## 2019-06-13 RX ADMIN — TAZOBACTAM SODIUM AND PIPERACILLIN SODIUM 3.38 G: 375; 3 INJECTION, SOLUTION INTRAVENOUS at 23:21

## 2019-06-13 RX ADMIN — PHENYLEPHRINE HYDROCHLORIDE 80 MCG: 10 INJECTION INTRAVENOUS at 14:34

## 2019-06-13 RX ADMIN — FENTANYL CITRATE 50 MCG: 50 INJECTION INTRAMUSCULAR; INTRAVENOUS at 16:00

## 2019-06-13 RX ADMIN — Medication 15 MG: at 16:50

## 2019-06-13 RX ADMIN — NEOSTIGMINE METHYLSULFATE 3 MG: 1 INJECTION, SOLUTION INTRAVENOUS at 15:16

## 2019-06-13 RX ADMIN — SCOPALAMINE 1 PATCH: 1 PATCH, EXTENDED RELEASE TRANSDERMAL at 12:26

## 2019-06-13 RX ADMIN — SODIUM CHLORIDE 100 ML/HR: 9 INJECTION, SOLUTION INTRAVENOUS at 18:50

## 2019-06-13 RX ADMIN — BUPIVACAINE HYDROCHLORIDE 60 ML: 2.5 INJECTION, SOLUTION EPIDURAL; INFILTRATION; INTRACAUDAL; PERINEURAL at 14:21

## 2019-06-13 RX ADMIN — ESMOLOL HYDROCHLORIDE 10 MG: 10 INJECTION INTRAVENOUS at 14:27

## 2019-06-13 RX ADMIN — GLYCOPYRROLATE 0.2 MG: 0.2 INJECTION, SOLUTION INTRAMUSCULAR; INTRAVENOUS at 14:40

## 2019-06-13 RX ADMIN — LIDOCAINE HYDROCHLORIDE 0.5 ML: 10 INJECTION, SOLUTION EPIDURAL; INFILTRATION; INTRACAUDAL; PERINEURAL at 12:26

## 2019-06-13 RX ADMIN — GABAPENTIN 600 MG: 300 CAPSULE ORAL at 20:10

## 2019-06-13 RX ADMIN — CELECOXIB 200 MG: 200 CAPSULE ORAL at 20:10

## 2019-06-13 RX ADMIN — DEXAMETHASONE SODIUM PHOSPHATE 4 MG: 10 INJECTION INTRAMUSCULAR; INTRAVENOUS at 14:21

## 2019-06-13 RX ADMIN — HYDROMORPHONE HYDROCHLORIDE 0.5 MG: 1 INJECTION, SOLUTION INTRAMUSCULAR; INTRAVENOUS; SUBCUTANEOUS at 21:08

## 2019-06-13 RX ADMIN — PROPOFOL 200 MG: 10 INJECTION, EMULSION INTRAVENOUS at 14:16

## 2019-06-13 RX ADMIN — BUPRENORPHINE HYDROCHLORIDE 0.3 MG: 0.32 INJECTION INTRAMUSCULAR; INTRAVENOUS at 14:21

## 2019-06-13 RX ADMIN — PHENYLEPHRINE HYDROCHLORIDE 80 MCG: 10 INJECTION INTRAVENOUS at 14:47

## 2019-06-13 RX ADMIN — HYDROMORPHONE HYDROCHLORIDE 0.5 MG: 1 INJECTION, SOLUTION INTRAMUSCULAR; INTRAVENOUS; SUBCUTANEOUS at 18:50

## 2019-06-13 RX ADMIN — GLYCOPYRROLATE 0.4 MG: 0.2 INJECTION, SOLUTION INTRAMUSCULAR; INTRAVENOUS at 15:16

## 2019-06-13 RX ADMIN — HYDROMORPHONE HYDROCHLORIDE 0.5 MG: 1 INJECTION, SOLUTION INTRAMUSCULAR; INTRAVENOUS; SUBCUTANEOUS at 15:52

## 2019-06-13 RX ADMIN — HYDROMORPHONE HYDROCHLORIDE 0.5 MG: 1 INJECTION, SOLUTION INTRAMUSCULAR; INTRAVENOUS; SUBCUTANEOUS at 16:27

## 2019-06-13 RX ADMIN — ESMOLOL HYDROCHLORIDE 50 MG: 10 INJECTION INTRAVENOUS at 14:16

## 2019-06-13 RX ADMIN — HEPARIN SODIUM 5000 UNITS: 5000 INJECTION INTRAVENOUS; SUBCUTANEOUS at 12:26

## 2019-06-13 NOTE — PLAN OF CARE
Problem: Patient Care Overview  Goal: Plan of Care Review  Outcome: Outcome(s) achieved Date Met: 06/13/19  Pt arrived sleepy, drowsy. C/O pain abdomen 9, see MAR. Incision d/i, old drainage. VS stable, continue monitoring.  Goal: Individualization and Mutuality  Outcome: Ongoing (interventions implemented as appropriate)    Goal: Discharge Needs Assessment  Outcome: Ongoing (interventions implemented as appropriate)    Goal: Interprofessional Rounds/Family Conf  Outcome: Ongoing (interventions implemented as appropriate)      Problem: Fall Risk (Adult)  Goal: Identify Related Risk Factors and Signs and Symptoms  Outcome: Ongoing (interventions implemented as appropriate)    Goal: Absence of Fall  Outcome: Ongoing (interventions implemented as appropriate)      Problem: Surgery Nonspecified (Adult)  Goal: Signs and Symptoms of Listed Potential Problems Will be Absent, Minimized or Managed (Surgery Nonspecified)  Outcome: Ongoing (interventions implemented as appropriate)    Goal: Anesthesia/Sedation Recovery  Outcome: Ongoing (interventions implemented as appropriate)

## 2019-06-13 NOTE — ANESTHESIA PROCEDURE NOTES
Peripheral Block      Patient reassessed immediately prior to procedure    Patient location during procedure: OR  Reason for block: at surgeon's request and post-op pain management  Performed by  Anesthesiologist: Esvin Narvaez MD  Preanesthetic Checklist  Completed: patient identified, site marked, surgical consent, pre-op evaluation, timeout performed, IV checked, risks and benefits discussed and monitors and equipment checked  Prep:  Pt Position: supine  Sterile barriers:cap, gloves, sterile barriers and mask  Prep: ChloraPrep  Patient monitoring: blood pressure monitoring, continuous pulse oximetry and EKG  Procedure  Sedation:yes  Performed under: general  Guidance:ultrasound guided  Images:still images obtained    Laterality:Bilateral  Block Type:TAP  Injection Technique:single-shot  Needle Type:short-bevel and echogenic  Needle Gauge:20 G      Medications Used: buprenorphine (BUPRENEX) injection, 0.3 mg  dexamethasone sodium phosphate injection, 4 mg  bupivacaine PF (MARCAINE) 0.25 % injection, 60 mL  Med admintered at 6/13/2019 2:21 PM      Medications  Comment:Block Injection:  LA dose divided between Right and Left block       Adjuncts:  Decadron 4mg PSF, Buprenex 0.3mg (Per total volume of LA)    Post Assessment  Injection Assessment: negative aspiration for heme, incremental injection and no paresthesia on injection  Patient Tolerance:comfortable throughout block  Complications:no  Additional Notes      Under Ultrasound guidance, a BBraun 4inch 360 degree needle was advanced with Normal Saline hydro dissection of tissue.  The Internal Oblique and Transversus Abdominus muscles where visualized.  At or before the aponeurosis of Internal Oblique, local anesthetic spread was visualized in the Transversus Abdominus Plane. Injection was made incrementally with aspiration every 5 mls.  There was no  intravascular injection,  injection pressure was normal, there was no neural injection, and the procedure  was completed without difficulty.  Thank You.

## 2019-06-13 NOTE — PROGRESS NOTES
Patient Name:  Marianne Rodriguez  YOB: 1975  [id number]    Surgery Post - Operative Note    Date of visit: 6/13/2019    Subjective   Subjective: Complains of pain, primarily in the back.       Objective     Objective:    /86   Pulse 82   Temp 98.1 °F (36.7 °C) (Temporal)   Resp 19   LMP 06/10/2019   SpO2 100%     CV:  Rate  regular and rhythm  regular  L:  Clear  to auscultation bilaterally   ABD:  Soft, appropriately tender. Incisions clean, dry and intact   EXT:  No cyanosis, clubbing or edema         Assessment/Plan     Assessment/ Plan: Stable after Resection of RP mass. 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

## 2019-06-13 NOTE — INTERVAL H&P NOTE
[facility] Pre-op    Full history and physical note from office is up to date.  See office note attached from 6/7/19 encounter.    /74 (BP Location: Right arm, Patient Position: Lying)   Pulse 96   Temp 98.3 °F (36.8 °C) (Temporal)   Resp 18   LMP 06/10/2019   SpO2 98%      Physical Exam:      Cardio - RRR. No heaves, rubs, gallops or murmurs.      Respiratory - Lungs clear to ascultation bilaterally. Respirations unlabored. No wheezes, crackles or rales.     IMM:  Influenza:  2017  Pneumococcal:  denies  Tetanus:  <10 years    LAB Results:  Lab Results   Component Value Date    WBC 10.27 06/11/2019    HGB 11.6 (L) 06/11/2019    HCT 38.2 06/11/2019    MCV 97.7 (H) 06/11/2019     (H) 06/11/2019    GLUCOSE 96 06/11/2019    BUN 11 06/11/2019    CREATININE 0.54 (L) 06/11/2019    EGFRIFNONA 124 06/11/2019     06/11/2019    K 4.5 06/11/2019     06/11/2019    CO2 26.0 06/11/2019    CALCIUM 9.5 06/11/2019       Cancer Staging (if applicable)  Cancer Patient: _x_ yes __no __unknown__N/A; If yes, clinical stage T:__ N:__M:__, stage group or __N/A    Jerson Thayer PA-C 6/13/2019 1:03 PM

## 2019-06-13 NOTE — OP NOTE
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  She had been treated elsewhere and lost to follow-up.  She was having back pain predominantly on the left side and imaging was performed to evaluate for lumbar disc disease.  Imaging however revealed an atypical mass with solid and fluid components in the retroperitoneum posterior to the psoas muscle and involving the significant portion of it just beneath the kidney on the right side.  This was felt not amenable to biopsy.  Patient did not have any suggestion of disease elsewhere in the chest abdomen pelvis.  Given the possibility of a recurrence and the fact that the patient did not have any infectious symptoms in her recent past or currently it was felt she would benefit from resection of that mass and other indicated procedures.  She was explained at length the risks and benefits the procedure and the rationale.  The potential for neuropathies postoperatively given the location of the tumor adjacent to the spine and involving the so as with the known lumbosacral plexus in the vicinity were all discussed in detail.  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      A Retroperitoneum Tissue · TISSUE PATHOLOGY EXAM   Tad Luque  MD Jeremy 6/13/19 1099 Yes     Description: RETROPERITONEAL MASS    B Retroperitoneum Tissue · TISSUE PATHOLOGY EXAM   Ene Tad Luna MD 6/13/19 3050 No     Description: EXTENDED DEEP MARGIN, STITCH FELIPE DEEP MARGIN            Findings: 1.  After complete mobilization of the GI track and kidney the entire retroperitoneum was able to be visualized.  Both palpation and ultrasound were more consistent with an inflammatory process rather than a true discrete mass.  However given the imaging studies and as discussed in a multidisciplinary fashion resection of the entire area was performed.  There was some fluid within the mass that did appear questionably consistent with pus versus chylous debris.  After resection the only firm area residual was the area abutting the vertebral bodies.    Complications: None immediate    Description of Procedure: After obtaining informed consent for the patient she was brought to the operating where she underwent general endotracheal anesthesia without obvious difficulty.  Sequential compression devices Orellana catheter and orogastric tube replaced.  Transabdominal preperitoneal block was delivered by anesthesia.  Her abdomen was prepped and draped in sterile fashion.  Intravenous antibiotics were given prior to incision and her name and planned procedure verified prior surgery.  A generous lower midline laparotomy was created and Bovie car was used to dissect down through layers the abdomen to the abdomen safely entered.  The abdomen was initially explored there was no other or obvious suggestion of diffuse malignancy throughout the abdomen.  The Bookwalter retractor was put in place for adequate visualization.  A Brausch Cattell maneuver was then performed mobilizing the entire GI tract office to review the mesentery and into the left upper quadrant.  The ovarian vessels and ureter were mobilized medially and the kidney was mobilized superiorly allowing a clear view of the  entire length of the psoas much on the retroperitoneum.  The external surface of the psoas muscle appeared relatively normal with the exception of the lateral aspect consistent with the area where the mass was adjacent.  Ultrasound was performed and again the mass appeared much more consistent with inflammatory change rather than a true discrete tumor.  However he with the assistance of ultrasound and I delineated appropriate margins this was also helped with palpation.  Then using both the large and short jog LigaSure devices the area was resected off the retroperitoneum.  I tried to preserve the anterior aspect of the psoas muscle in hopes of allowing the lumbosacral praxis to be preserved most of the extension was posterior lateral and therefore this area was resected down to the level of the lateral vertebral bodies.  The vena cava was swept superiorly and medially to be avoided.  Once this was completed there was an area of thickened tissue more posterior and lateral that was resected with a second deep margin specimen.  After the resection of that portion I can only appreciate some firm fibrous tissue along the medial aspect of where the tumor had not been or inflammatory process in this case immediately adjacent to the vertebral body.  At this point I had gotten frozen section confirmation that this was very possibly an inflammatory process rather than a true malignancy and therefore made no attempts to resect the periosteum off of the bone.  At that point after assuring adequate hemostasis with aqua Valentina TissueLink fascia was closed with running PDS suture after laying the anatomic structures in their appropriate position in particular the GI tract making sure there is no twisting on the mesentery peer the kidney is also been replaced as well.  The fascia was then closed as stated with running PDS suture after assuring the correct surgical count.  Superficial incision was copiously again closed with  subcuticular suture and skin affix.  The patient was allowed to wake from anesthesia without obvious difficulty and transferred to the recovery room in stable condition.  All counts are correct at operation completion.  I performed the entire procedure with the assistance of a nursing tech      Tad Luque MD     Date: 6/13/2019  Time: 3:50 PM

## 2019-06-13 NOTE — ANESTHESIA POSTPROCEDURE EVALUATION
Patient: Marianne Rodriguez    Procedure Summary     Date:  06/13/19 [facility] / [facility]:   [facility]    Anesthesia Start:  1412 Anesthesia Stop:  1533    Procedure:  RADICAL RESECTION OF RETROPERITONEAL TUMOR, INTRAOPERATIVE ULTRASOUND (N/A Abdomen) Diagnosis:      Surgeon:  Tad Luque MD Provider:  Edi Larry MD    Anesthesia Type:  general ASA Status:  2          Anesthesia Type: general  Last vitals  BP   124/65 (06/13/19 1533)   Temp   97.8 °F (36.6 °C) (06/13/19 1533)   Pulse   80 (06/13/19 1533)   Resp   14 (06/13/19 1533)     SpO2   100 % (06/13/19 1533)     Post Anesthesia Care and Evaluation    Patient location during evaluation: PACU  Patient participation: waiting for patient participation  Level of consciousness: responsive to physical stimuli  Pain management: adequate  Airway patency: patent  Anesthetic complications: No anesthetic complications  PONV Status: none  Cardiovascular status: hemodynamically stable and acceptable  Respiratory status: nonlabored ventilation, acceptable, nasal cannula and oral airway  Hydration status: acceptable

## 2019-06-13 NOTE — ANESTHESIA PREPROCEDURE EVALUATION
Anesthesia Evaluation                  Airway   Mallampati: I  TM distance: >3 FB  Neck ROM: full  No difficulty expected  Dental      Pulmonary    (+) a smoker, asthma,   Cardiovascular     ECG reviewed    (+) hypertension,       Neuro/Psych  (+) psychiatric history Depression,     GI/Hepatic/Renal/Endo    (+)  GERD,      Musculoskeletal     Abdominal    Substance History      OB/GYN          Other                        Anesthesia Plan    ASA 2     general   (Tap)  intravenous induction   Anesthetic plan, all risks, benefits, and alternatives have been provided, discussed and informed consent has been obtained with: patient.    Plan discussed with CRNA.

## 2019-06-13 NOTE — ANESTHESIA PROCEDURE NOTES
Airway  Urgency: elective    Airway not difficult    General Information and Staff    Patient location during procedure: OR  CRNA: Dalton Grullon CRNA    Indications and Patient Condition  Indications for airway management: airway protection    Preoxygenated: yes  MILS not maintained throughout  Mask difficulty assessment: 2 - vent by mask + OA or adjuvant +/- NMBA    Final Airway Details  Final airway type: endotracheal airway      Successful airway: ETT  Cuffed: yes   Successful intubation technique: direct laryngoscopy  Facilitating devices/methods: intubating stylet  Endotracheal tube insertion site: oral  Blade: Syl  Blade size: 3  ETT size (mm): 7.0  Cormack-Lehane Classification: grade IIa - partial view of glottis  Placement verified by: chest auscultation and capnometry   Number of attempts at approach: 1    Additional Comments  Negative epigastric sounds, Breath sound equal bilaterally with symmetric chest rise and fall

## 2019-06-14 PROBLEM — K21.9 GERD (GASTROESOPHAGEAL REFLUX DISEASE): Status: ACTIVE | Noted: 2019-06-14

## 2019-06-14 PROBLEM — F41.9 ANXIETY AND DEPRESSION: Status: ACTIVE | Noted: 2019-06-14

## 2019-06-14 PROBLEM — R19.00 RETROPERITONEAL MASS: Status: ACTIVE | Noted: 2019-06-14

## 2019-06-14 PROBLEM — F32.A ANXIETY AND DEPRESSION: Status: ACTIVE | Noted: 2019-06-14

## 2019-06-14 PROBLEM — I10 ESSENTIAL HYPERTENSION: Status: ACTIVE | Noted: 2019-06-14

## 2019-06-14 PROBLEM — Z72.0 TOBACCO ABUSE: Status: ACTIVE | Noted: 2019-06-14

## 2019-06-14 LAB
ANION GAP SERPL CALCULATED.3IONS-SCNC: 12 MMOL/L
BASOPHILS # BLD AUTO: 0.02 10*3/MM3 (ref 0–0.2)
BASOPHILS NFR BLD AUTO: 0.1 % (ref 0–1.5)
BUN BLD-MCNC: 8 MG/DL (ref 6–20)
BUN/CREAT SERPL: 19 (ref 7–25)
CALCIUM SPEC-SCNC: 8.5 MG/DL (ref 8.6–10.5)
CHLORIDE SERPL-SCNC: 100 MMOL/L (ref 98–107)
CO2 SERPL-SCNC: 25 MMOL/L (ref 22–29)
CREAT BLD-MCNC: 0.42 MG/DL (ref 0.57–1)
CYTO UR: NORMAL
DEPRECATED RDW RBC AUTO: 50.4 FL (ref 37–54)
EOSINOPHIL # BLD AUTO: 0 10*3/MM3 (ref 0–0.4)
EOSINOPHIL NFR BLD AUTO: 0 % (ref 0.3–6.2)
ERYTHROCYTE [DISTWIDTH] IN BLOOD BY AUTOMATED COUNT: 14.6 % (ref 12.3–15.4)
GFR SERPL CREATININE-BSD FRML MDRD: >150 ML/MIN/1.73
GLUCOSE BLD-MCNC: 145 MG/DL (ref 65–99)
HCT VFR BLD AUTO: 30.5 % (ref 34–46.6)
HGB BLD-MCNC: 9.7 G/DL (ref 12–15.9)
IMM GRANULOCYTES # BLD AUTO: 0.07 10*3/MM3 (ref 0–0.05)
IMM GRANULOCYTES NFR BLD AUTO: 0.5 % (ref 0–0.5)
LAB AP CASE REPORT: NORMAL
LAB AP CLINICAL INFORMATION: NORMAL
LAB AP DIAGNOSIS COMMENT: NORMAL
LYMPHOCYTES # BLD AUTO: 0.61 10*3/MM3 (ref 0.7–3.1)
LYMPHOCYTES NFR BLD AUTO: 4.4 % (ref 19.6–45.3)
Lab: NORMAL
MCH RBC QN AUTO: 29.8 PG (ref 26.6–33)
MCHC RBC AUTO-ENTMCNC: 31.8 G/DL (ref 31.5–35.7)
MCV RBC AUTO: 93.6 FL (ref 79–97)
MONOCYTES # BLD AUTO: 0.68 10*3/MM3 (ref 0.1–0.9)
MONOCYTES NFR BLD AUTO: 4.9 % (ref 5–12)
NEUTROPHILS # BLD AUTO: 12.56 10*3/MM3 (ref 1.7–7)
NEUTROPHILS NFR BLD AUTO: 90.1 % (ref 42.7–76)
NRBC BLD AUTO-RTO: 0 /100 WBC (ref 0–0.2)
PATH REPORT.FINAL DX SPEC: NORMAL
PATH REPORT.GROSS SPEC: NORMAL
PLAT MORPH BLD: NORMAL
PLATELET # BLD AUTO: 474 10*3/MM3 (ref 140–450)
PMV BLD AUTO: 9.1 FL (ref 6–12)
POTASSIUM BLD-SCNC: 4.2 MMOL/L (ref 3.5–5.2)
RBC # BLD AUTO: 3.26 10*6/MM3 (ref 3.77–5.28)
RBC MORPH BLD: NORMAL
SODIUM BLD-SCNC: 137 MMOL/L (ref 136–145)
WBC MORPH BLD: NORMAL
WBC NRBC COR # BLD: 13.94 10*3/MM3 (ref 3.4–10.8)

## 2019-06-14 PROCEDURE — 97162 PT EVAL MOD COMPLEX 30 MIN: CPT

## 2019-06-14 PROCEDURE — 25010000002 HYDROMORPHONE 1 MG/ML SOLUTION: Performed by: SURGERY

## 2019-06-14 PROCEDURE — 25010000002 KETOROLAC TROMETHAMINE PER 15 MG: Performed by: SURGERY

## 2019-06-14 PROCEDURE — 97116 GAIT TRAINING THERAPY: CPT

## 2019-06-14 PROCEDURE — 99253 IP/OBS CNSLTJ NEW/EST LOW 45: CPT | Performed by: PHYSICIAN ASSISTANT

## 2019-06-14 PROCEDURE — 85007 BL SMEAR W/DIFF WBC COUNT: CPT | Performed by: SURGERY

## 2019-06-14 PROCEDURE — 25010000002 PIPERACILLIN SOD-TAZOBACTAM PER 1 G: Performed by: SURGERY

## 2019-06-14 PROCEDURE — 25010000002 ENOXAPARIN PER 10 MG: Performed by: SURGERY

## 2019-06-14 PROCEDURE — 80048 BASIC METABOLIC PNL TOTAL CA: CPT | Performed by: SURGERY

## 2019-06-14 PROCEDURE — 85025 COMPLETE CBC W/AUTO DIFF WBC: CPT | Performed by: SURGERY

## 2019-06-14 PROCEDURE — 25010000002 HYDROMORPHONE PER 4 MG: Performed by: SURGERY

## 2019-06-14 PROCEDURE — 97530 THERAPEUTIC ACTIVITIES: CPT

## 2019-06-14 PROCEDURE — 94799 UNLISTED PULMONARY SVC/PX: CPT

## 2019-06-14 RX ORDER — NALOXONE HCL 0.4 MG/ML
0.1 VIAL (ML) INJECTION
Status: DISCONTINUED | OUTPATIENT
Start: 2019-06-14 | End: 2019-06-18 | Stop reason: HOSPADM

## 2019-06-14 RX ORDER — OXYCODONE AND ACETAMINOPHEN 7.5; 325 MG/1; MG/1
1 TABLET ORAL EVERY 4 HOURS PRN
Status: DISCONTINUED | OUTPATIENT
Start: 2019-06-14 | End: 2019-06-18

## 2019-06-14 RX ORDER — KETOROLAC TROMETHAMINE 30 MG/ML
30 INJECTION, SOLUTION INTRAMUSCULAR; INTRAVENOUS EVERY 6 HOURS
Status: DISCONTINUED | OUTPATIENT
Start: 2019-06-14 | End: 2019-06-15

## 2019-06-14 RX ADMIN — LISINOPRIL 20 MG: 20 TABLET ORAL at 08:25

## 2019-06-14 RX ADMIN — HYDROMORPHONE HYDROCHLORIDE 0.5 MG: 1 INJECTION, SOLUTION INTRAMUSCULAR; INTRAVENOUS; SUBCUTANEOUS at 10:10

## 2019-06-14 RX ADMIN — KETOROLAC TROMETHAMINE 30 MG: 30 INJECTION, SOLUTION INTRAMUSCULAR at 16:05

## 2019-06-14 RX ADMIN — HYDROMORPHONE HYDROCHLORIDE 0.5 MG: 1 INJECTION, SOLUTION INTRAMUSCULAR; INTRAVENOUS; SUBCUTANEOUS at 01:35

## 2019-06-14 RX ADMIN — SENNOSIDES,DOCUSATE SODIUM 2 TABLET: 8.6; 5 TABLET, FILM COATED ORAL at 08:24

## 2019-06-14 RX ADMIN — OXYCODONE HYDROCHLORIDE AND ACETAMINOPHEN 1 TABLET: 7.5; 325 TABLET ORAL at 19:00

## 2019-06-14 RX ADMIN — OXYCODONE HYDROCHLORIDE AND ACETAMINOPHEN 1 TABLET: 10; 325 TABLET ORAL at 18:25

## 2019-06-14 RX ADMIN — SENNOSIDES,DOCUSATE SODIUM 2 TABLET: 8.6; 5 TABLET, FILM COATED ORAL at 22:17

## 2019-06-14 RX ADMIN — OXYCODONE HYDROCHLORIDE AND ACETAMINOPHEN 1 TABLET: 10; 325 TABLET ORAL at 09:14

## 2019-06-14 RX ADMIN — GABAPENTIN 600 MG: 300 CAPSULE ORAL at 22:17

## 2019-06-14 RX ADMIN — GABAPENTIN 600 MG: 300 CAPSULE ORAL at 15:15

## 2019-06-14 RX ADMIN — HYDROMORPHONE HYDROCHLORIDE 1 MG: 1 INJECTION, SOLUTION INTRAMUSCULAR; INTRAVENOUS; SUBCUTANEOUS at 22:17

## 2019-06-14 RX ADMIN — OXYCODONE HYDROCHLORIDE AND ACETAMINOPHEN 1 TABLET: 10; 325 TABLET ORAL at 13:02

## 2019-06-14 RX ADMIN — KETOROLAC TROMETHAMINE 30 MG: 30 INJECTION, SOLUTION INTRAMUSCULAR at 22:17

## 2019-06-14 RX ADMIN — HYDROXYZINE HYDROCHLORIDE 25 MG: 25 TABLET ORAL at 01:35

## 2019-06-14 RX ADMIN — TAZOBACTAM SODIUM AND PIPERACILLIN SODIUM 3.38 G: 375; 3 INJECTION, SOLUTION INTRAVENOUS at 16:05

## 2019-06-14 RX ADMIN — ALBUTEROL SULFATE 2.5 MG: 2.5 SOLUTION RESPIRATORY (INHALATION) at 12:30

## 2019-06-14 RX ADMIN — GABAPENTIN 600 MG: 300 CAPSULE ORAL at 05:57

## 2019-06-14 RX ADMIN — OXYCODONE HYDROCHLORIDE AND ACETAMINOPHEN 1 TABLET: 10; 325 TABLET ORAL at 23:04

## 2019-06-14 RX ADMIN — PANTOPRAZOLE SODIUM 40 MG: 40 TABLET, DELAYED RELEASE ORAL at 05:57

## 2019-06-14 RX ADMIN — ENOXAPARIN SODIUM 40 MG: 40 INJECTION SUBCUTANEOUS at 08:25

## 2019-06-14 RX ADMIN — CELECOXIB 200 MG: 200 CAPSULE ORAL at 08:24

## 2019-06-14 RX ADMIN — TAZOBACTAM SODIUM AND PIPERACILLIN SODIUM 3.38 G: 375; 3 INJECTION, SOLUTION INTRAVENOUS at 05:57

## 2019-06-14 RX ADMIN — HYDROMORPHONE HYDROCHLORIDE 0.5 MG: 1 INJECTION, SOLUTION INTRAMUSCULAR; INTRAVENOUS; SUBCUTANEOUS at 05:56

## 2019-06-14 RX ADMIN — HYDROMORPHONE HYDROCHLORIDE 0.5 MG: 1 INJECTION, SOLUTION INTRAMUSCULAR; INTRAVENOUS; SUBCUTANEOUS at 08:25

## 2019-06-14 RX ADMIN — HYDROMORPHONE HYDROCHLORIDE 0.5 MG: 1 INJECTION, SOLUTION INTRAMUSCULAR; INTRAVENOUS; SUBCUTANEOUS at 15:15

## 2019-06-14 RX ADMIN — ALBUTEROL SULFATE 2.5 MG: 2.5 SOLUTION RESPIRATORY (INHALATION) at 20:26

## 2019-06-14 RX ADMIN — ALBUTEROL SULFATE 2.5 MG: 2.5 SOLUTION RESPIRATORY (INHALATION) at 16:17

## 2019-06-14 RX ADMIN — OXYCODONE HYDROCHLORIDE AND ACETAMINOPHEN 1 TABLET: 5; 325 TABLET ORAL at 03:59

## 2019-06-14 RX ADMIN — DULOXETINE HYDROCHLORIDE 60 MG: 60 CAPSULE, DELAYED RELEASE ORAL at 08:24

## 2019-06-14 RX ADMIN — HYDROMORPHONE HYDROCHLORIDE 0.5 MG: 1 INJECTION, SOLUTION INTRAMUSCULAR; INTRAVENOUS; SUBCUTANEOUS at 03:35

## 2019-06-14 NOTE — PLAN OF CARE
Problem: Patient Care Overview  Goal: Plan of Care Review  Outcome: Ongoing (interventions implemented as appropriate)   06/14/19 0405   Coping/Psychosocial   Plan of Care Reviewed With patient   Plan of Care Review   Progress no change   OTHER   Outcome Summary Pt's VSS throughout shift. She stayed on 2L nasal canula until 0400 and switched to room air. O2 sats stayed in the 90's on room air. Pt complained of pain which improved some with prn pain meds. No acute overnight events. Cont POC.       Problem: Fall Risk (Adult)  Goal: Absence of Fall  Outcome: Ongoing (interventions implemented as appropriate)      Problem: Surgery Nonspecified (Adult)  Goal: Signs and Symptoms of Listed Potential Problems Will be Absent, Minimized or Managed (Surgery Nonspecified)  Outcome: Ongoing (interventions implemented as appropriate)      Problem: Pain, Chronic (Adult)  Goal: Acceptable Pain/Comfort Level and Functional Ability  Outcome: Ongoing (interventions implemented as appropriate)

## 2019-06-14 NOTE — PAYOR COMM NOTE
"Ref # [id number]  Annabelle Reyes RN, BSN  Phone # [phone]  Fax # [phone]  Marianne Rodriguez (44 y.o. Female)     Date of Birth Social Security Number Address Home Phone MRN    1975  [address] [address] [phone] [id number]    Yaz[employer] Marital Status          Alevism        Admission Date Admission Type Admitting Provider Attending Provider Department, Room/Bed    6/13/19 Elective Tad Luque MD McKenzie, Shaun Patrick, MD [facility] 6B, N627/1    Discharge Date Discharge Disposition Discharge Destination                       Attending Provider:  Tad Luque MD    Allergies:  No Known Allergies    Isolation:  None   Infection:  None   Code Status:  CPR    Ht:  162.6 cm (64\")   Wt:  61 kg (134 lb 8 oz)    Admission Cmt:  None   Principal Problem:  None                Active Insurance as of 6/13/2019     Primary Coverage     Payor Plan Insurance Group Employer/Plan Group    [employer] [place] [employer] [place]      Payor Plan Address Payor Plan Phone Number Payor Plan Fax Number Effective Dates    [address] [address] [phone]  6/10/2019 - None Entered    [facility] [id number]       Subscriber Name Subscriber Birth Date Member ID       MARIANNE RODRIGUEZ 1975 [id number]                 Emergency Contacts      (Rel.) Home Phone Work Phone Mobile Phone    Nelia Kay (Mother) [phone] -- --               Operative/Procedure Notes (last 48 hours) (Notes from 6/12/2019  9:22 AM through 6/14/2019  9:22 AM)      Tad Luque MD at 6/13/2019  2:29 PM          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  She had been treated elsewhere and lost to follow-up.  She was having back " pain predominantly on the left side and imaging was performed to evaluate for lumbar disc disease.  Imaging however revealed an atypical mass with solid and fluid components in the retroperitoneum posterior to the psoas muscle and involving the significant portion of it just beneath the kidney on the right side.  This was felt not amenable to biopsy.  Patient did not have any suggestion of disease elsewhere in the chest abdomen pelvis.  Given the possibility of a recurrence and the fact that the patient did not have any infectious symptoms in her recent past or currently it was felt she would benefit from resection of that mass and other indicated procedures.  She was explained at length the risks and benefits the procedure and the rationale.  The potential for neuropathies postoperatively given the location of the tumor adjacent to the spine and involving the so as with the known lumbosacral plexus in the vicinity were all discussed in detail.  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      A Retroperitoneum Tissue · TISSUE PATHOLOGY EXAM   Tad Luque MD 6/13/19 5904 Yes     Description: RETROPERITONEAL MASS    B Retroperitoneum Tissue · TISSUE PATHOLOGY EXAM   Tad Luque MD 6/13/19 3269 No     Description: EXTENDED DEEP MARGIN, STITCH FELIPE DEEP MARGIN            Findings: 1.  After complete mobilization of the GI track and kidney the entire retroperitoneum was  able to be visualized.  Both palpation and ultrasound were more consistent with an inflammatory process rather than a true discrete mass.  However given the imaging studies and as discussed in a multidisciplinary fashion resection of the entire area was performed.  There was some fluid within the mass that did appear questionably consistent with pus versus chylous debris.  After resection the only firm area residual was the area abutting the vertebral bodies.    Complications: None immediate    Description of Procedure: After obtaining informed consent for the patient she was brought to the operating where she underwent general endotracheal anesthesia without obvious difficulty.  Sequential compression devices Orellana catheter and orogastric tube replaced.  Transabdominal preperitoneal block was delivered by anesthesia.  Her abdomen was prepped and draped in sterile fashion.  Intravenous antibiotics were given prior to incision and her name and planned procedure verified prior surgery.  A generous lower midline laparotomy was created and Bovie car was used to dissect down through layers the abdomen to the abdomen safely entered.  The abdomen was initially explored there was no other or obvious suggestion of diffuse malignancy throughout the abdomen.  The Bookwalter retractor was put in place for adequate visualization.  A Brausch Cattell maneuver was then performed mobilizing the entire GI tract office to review the mesentery and into the left upper quadrant.  The ovarian vessels and ureter were mobilized medially and the kidney was mobilized superiorly allowing a clear view of the entire length of the psoas much on the retroperitoneum.  The external surface of the psoas muscle appeared relatively normal with the exception of the lateral aspect consistent with the area where the mass was adjacent.  Ultrasound was performed and again the mass appeared much more consistent with inflammatory change rather than a true  discrete tumor.  However he with the assistance of ultrasound and I delineated appropriate margins this was also helped with palpation.  Then using both the large and short jog LigaSure devices the area was resected off the retroperitoneum.  I tried to preserve the anterior aspect of the psoas muscle in hopes of allowing the lumbosacral praxis to be preserved most of the extension was posterior lateral and therefore this area was resected down to the level of the lateral vertebral bodies.  The vena cava was swept superiorly and medially to be avoided.  Once this was completed there was an area of thickened tissue more posterior and lateral that was resected with a second deep margin specimen.  After the resection of that portion I can only appreciate some firm fibrous tissue along the medial aspect of where the tumor had not been or inflammatory process in this case immediately adjacent to the vertebral body.  At this point I had gotten frozen section confirmation that this was very possibly an inflammatory process rather than a true malignancy and therefore made no attempts to resect the periosteum off of the bone.  At that point after assuring adequate hemostasis with aqua Valentina TissueLink fascia was closed with running PDS suture after laying the anatomic structures in their appropriate position in particular the GI tract making sure there is no twisting on the mesentery peer the kidney is also been replaced as well.  The fascia was then closed as stated with running PDS suture after assuring the correct surgical count.  Superficial incision was copiously again closed with subcuticular suture and skin affix.  The patient was allowed to wake from anesthesia without obvious difficulty and transferred to the recovery room in stable condition.  All counts are correct at operation completion.  I performed the entire procedure with the assistance of a nursing tech      Tad Luque MD     Date: 6/13/2019   Time: 3:50 PM      Electronically signed by Tad Luque MD at 6/13/2019  3:58 PM          Physician Progress Notes (last 48 hours) (Notes from 6/12/2019  9:22 AM through 6/14/2019  9:22 AM)      Renny Andrews MD at 6/13/2019  6:53 PM          Patient Name:  Marianne Rodriguez  YOB: 1975  [id number]    Surgery Post - Operative Note    Date of visit: 6/13/2019    Subjective   Subjective: Complains of pain, primarily in the back.      Objective     Objective:    /86   Pulse 82   Temp 98.1 °F (36.7 °C) (Temporal)   Resp 19   LMP 06/10/2019   SpO2 100%     CV:  Rate  regular and rhythm  regular  L:  Clear  to auscultation bilaterally   ABD:  Soft, appropriately tender. Incisions clean, dry and intact   EXT:  No cyanosis, clubbing or edema        Assessment/Plan     Assessment/ Plan: Stable after Resection of RP mass. 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 She stayed on 2L nasal canula until 0400 and switched to room air. O2 sats stayed in the 90's on room air. Pt complained of pain which improved some with prn pain meds. No acute overnight events. Cont POC.         Problem: Fall Risk (Adult)  Goal: Absence of Fall  Outcome: Ongoing (interventions implemented as appropriate)        Problem: Surgery  Nonspecified (Adult)  Goal: Signs and Symptoms of Listed Potential Problems Will be Absent, Minimized or Managed (Surgery Nonspecified)  Outcome: Ongoing (interventions implemented as appropriate)        Problem: Pain, Chronic (Adult)  Goal: Acceptable Pain/Comfort Level and Functional Ability  Outcome: Ongoing (interventions implemented as appropriate)                       Orders:   Zosyn 3.375 g IV q8Hr  Heparin 5000units/ML Q12H  NS IVF @ 100ml/hr Continuous  Dilaudid 0.5mg IV Q2hr prn  Zofran 4mg oral q6hrn  Percocet 10 oral Q4hr prn   Clear liquid diet  SCDS continuous   Vitals Q4hrn   Pulse ox continuous

## 2019-06-14 NOTE — PROGRESS NOTES
Discharge Planning Assessment  [facility]     Patient Name: Marianne Rodriguez  MRN: [id number]  Today's Date: 6/14/2019    Admit Date: 6/13/2019    Discharge Needs Assessment     Row Name 06/14/19 1420       Living Environment    Lives With  parent(s)    Current Living Arrangements  home/apartment/condo    Primary Care Provided by  self    Provides Primary Care For  no one    Family Caregiver if Needed  parent(s)    Quality of Family Relationships  helpful;involved    Able to Return to Prior Arrangements  no       Resource/Environmental Concerns    Transportation Concerns  car, none       Transition Planning    Patient/Family Anticipates Transition to  home    Patient/Family Anticipated Services at Transition      Transportation Anticipated  family or friend will provide       Discharge Needs Assessment    Readmission Within the Last 30 Days  no previous admission in last 30 days    Concerns to be Addressed  discharge planning    Equipment Currently Used at Home  none    Anticipated Changes Related to Illness  none    Equipment Needed After Discharge  none        Discharge Plan     Row Name 06/14/19 1427       Plan    Plan  Home at discharge     Patient/Family in Agreement with Plan  yes    Plan Comments  Spoke with patient at bedside - she states she lives with her parents in [facility]. She is independent of ADLs and was not using any DME at home prior to admission. PCP confirmed to be Doris Self. Goal is to return home with parents when medically ready - juvenal [phone]     Final Discharge Disposition Code  01 - home or self-care        Destination      No service coordination in this encounter.      Durable Medical Equipment      No service coordination in this encounter.      Dialysis/Infusion      No service coordination in this encounter.      Home Medical Care      No service coordination in this encounter.      Therapy      No service coordination in this encounter.       Community Resources      No service coordination in this encounter.          Demographic Summary     Row Name 06/14/19 1420       General Information    Admission Type  inpatient    Arrived From  home    Referral Source  admission list    Reason for Consult  discharge planning    Preferred Language  English     Used During This Interaction  no       Contact Information    Permission Granted to Share Info With          Functional Status     Row Name 06/14/19 1420       Functional Status    Usual Activity Tolerance  good    Current Activity Tolerance  moderate       Functional Status, IADL    Medications  independent    Meal Preparation  independent    Housekeeping  independent    Laundry  independent    Shopping  independent       Mental Status    General Appearance WDL  WDL       Mental Status Summary    Recent Changes in Mental Status/Cognitive Functioning  no changes        Psychosocial    No documentation.       Abuse/Neglect    No documentation.       Legal    No documentation.       Substance Abuse    No documentation.       Patient Forms    No documentation.           Massiel Beck RN

## 2019-06-14 NOTE — PLAN OF CARE
"Problem: Patient Care Overview  Goal: Plan of Care Review  Outcome: Ongoing (interventions implemented as appropriate)   06/14/19 0933   Coping/Psychosocial   Plan of Care Reviewed With patient   Plan of Care Review   Progress improving   OTHER   Outcome Summary PT evaluation completed. Pt presents with decrease in functional mobility s/p R psoas resection. Pt to benefit from IP PT services to return to PLOF. Pt was primarily limited by pain during session, but was able to ambulate 10' CGA using RW. Pt stated her R thigh is still numb but pt was able to feel light tough below knees B. Pt says R medial lower leg still feels \"funny\" but able to identify light touch. LE strength was equal bilaterally. Recommend home with assist and possible HH PT for strengthening if pt agreeable. Pt denied equipment needs at this time, thinking she will be fine once surgical pain is gone, will monitor.          "

## 2019-06-14 NOTE — CONSULTS
[facility]  CONSULT NOTE      Patient Name: Marianne Rodriguez  : 1975  MRN: [id number]    Primary Care Physician: Doris Self APRN  Provider requesting consultation: Tad Luque,*     Subjective     Reason for Consultation: postoperative medical management    HPI:  Marianne Rodriguez is a 44 y.o. female with PMH significant for HTN, GERD, anxiety/depression, peripheral neuropathy and cervical dysplasia. She was diagnosed with angiosarcoma of the L breast in 2017. She underwent mastectomy, chemotherapy and radiation. She was followed by Dr. Avila of [place] but was discharged from the practice after missing too many appointments. She established care with Dr. Charlotte Mann. An MRI had recently been obtained at  and she was found to have a cystic lesion in the lower pelvis. She was referred to Dr. Luque for further evaluation.     She was [facility] to [facility] for resection of a retroperitoneal tumor with interoperative ultrasound. Frozen section showed possible inflammatory process as opposed to a true malignancy. Hospital medicine consulted post-operatively for medical management.     At time of exam, she is laying in bed with family at bedside. She complains of persistent abdominal pain despite Oxycodone and Dilaudid. Her BP is borderline low. She is hungry. Nausea, not vomiting. No flatus or BM.     Review of Systems  Gen- No fevers, chills  CV- No chest pain, palpitations  Resp- No cough, dyspnea  GI- as above    Otherwise complete ROS reviewed and is negative except as mentioned in the HPI.    Past Medical History:   Diagnosis Date   • Abdominal mass    • Anxiety and depression    • Breast cancer (CMS/HCC)     Angisarcoma of the breast ~ Aug/Sept 2017 [place] post Chemotherapy/Radiationtherapy   • Current every day smoker    • Gallstones    • GERD (gastroesophageal reflux disease)    • History of  transfusion     2017   • Hypertension      Past Surgical History:   Procedure Laterality Date   • EXPLORATORY LAPAROTOMY N/A 6/13/2019    Procedure: RADICAL RESECTION OF RETROPERITONEAL TUMOR, INTRAOPERATIVE ULTRASOUND;  Surgeon: Tad Luque MD;  Location: [facility];  Service: General   • LAPAROSCOPIC CHOLECYSTECTOMY  2012   • LASIK     • LEEP  05/2017   • MASTECTOMY Left 09/2017   • TUBAL ABDOMINAL LIGATION  2002     Family History: family history includes Bone cancer in her father; Emphysema in her mother; Hodgkin's lymphoma in her father; Hyperlipidemia in her mother; Hypertension in her mother; Prostate cancer in her father. Otherwise pertinent FHx was reviewed and unremarkable.     Social History:  reports that she has been smoking cigarettes.  She has a 10.00 pack-year smoking history. She has never used smokeless tobacco. She reports that she does not drink alcohol or use drugs.    Medications:  Medications Prior to Admission   Medication Sig Dispense Refill Last Dose   • albuterol sulfate  (90 Base) MCG/ACT inhaler INL 2 INHALATIONS PO QID PRF WHZ  0 Past Month   • Cholecalciferol (VITAMIN D3) 5000 units capsule capsule TK 1 C PO QD  11 6/12/2019   • DULoxetine (CYMBALTA) 60 MG capsule TK ONE C PO QD  1 6/13/2019   • gabapentin (NEURONTIN) 300 MG capsule Take 2 capsules by mouth 3 (Three) Times a Day. 180 capsule 3 6/13/2019   • hydrOXYzine (ATARAX) 25 MG tablet TK 1 T PO QHS  1 6/12/2019   • lisinopril (PRINIVIL,ZESTRIL) 20 MG tablet Take 20 mg by mouth Daily.  5 6/12/2019   • pantoprazole (PROTONIX) 40 MG EC tablet 40 mg Daily.   6/13/2019   • vitamin B-12 (CYANOCOBALAMIN) 1000 MCG tablet Take 1,000 mcg by mouth Daily.  11 6/12/2019     Scheduled Meds:  albuterol 2.5 mg Nebulization 4x Daily - RT   celecoxib 200 mg Oral Q12H   DULoxetine 60 mg Oral Daily   enoxaparin 40 mg Subcutaneous Daily   gabapentin 600 mg Oral Q8H   lisinopril 20 mg Oral Daily   pantoprazole 40 mg Oral Q AM    piperacillin-tazobactam 3.375 g Intravenous Q8H   sennosides-docusate sodium 2 tablet Oral BID     Continuous Infusions:  sodium chloride 100 mL/hr Last Rate: 100 mL/hr (06/14/19 0401)     PRN Meds:.•  acetaminophen **OR** acetaminophen **OR** acetaminophen  •  bisacodyl  •  HYDROmorphone **AND** naloxone  •  hydrOXYzine  •  magnesium hydroxide  •  ondansetron **OR** ondansetron  •  oxyCODONE-acetaminophen  •  oxyCODONE-acetaminophen  •  promethazine **OR** promethazine    No Known Allergies    Objective     Vital Signs:   Temp:  [97.5 °F (36.4 °C)-98.1 °F (36.7 °C)] 97.8 °F (36.6 °C)  Heart Rate:  [61-88] 61  Resp:  [14-22] 14  BP: ()/(57-86) 97/57     Physical Exam  Constitutional: Awake, alert and conversant. Appears uncomfortable   Eyes: PERRLA, sclerae anicteric, no conjunctival injection  HENT: NCAT, mucous membranes moist  Neck: Supple, no thyromegaly, no lymphadenopathy, trachea midline  Respiratory: Clear to auscultation bilaterally, nonlabored respirations   Cardiovascular: RRR, no murmurs, rubs, or gallops, palpable pedal pulses bilaterally  Gastrointestinal: Positive bowel sounds, soft, appropriately tender. Incision not examined   Musculoskeletal: No bilateral ankle edema, no clubbing or cyanosis to extremities  Psychiatric: Appropriate affect, cooperative  Neurologic: Oriented x 3, strength symmetric in all extremities, Cranial Nerves grossly intact to confrontation, speech clear  Skin: No rashes    Results Reviewed:  I have personally reviewed current lab, radiology, and data and agree.    Results from last 7 days   Lab Units 06/14/19  0500   WBC 10*3/mm3 13.94*   HEMOGLOBIN g/dL 9.7*   HEMATOCRIT % 30.5*   PLATELETS 10*3/mm3 474*     Results from last 7 days   Lab Units 06/14/19  0500   SODIUM mmol/L 137   POTASSIUM mmol/L 4.2   CHLORIDE mmol/L 100   CO2 mmol/L 25.0   BUN mg/dL 8   CREATININE mg/dL 0.42*   GLUCOSE mg/dL 145*   CALCIUM mg/dL 8.5*     Estimated Creatinine Clearance: 168 mL/min  (A) (by C-G formula based on SCr of 0.42 mg/dL (L)).  Brief Urine Lab Results  (Last result in the past 365 days)      Color   Clarity   Blood   Leuk Est   Nitrite   Protein   CREAT   Urine HCG        06/13/19 1241               Negative         No results found for: BNP    Microbiology Results Abnormal     None        Imaging Results (last 24 hours)     ** No results found for the last 24 hours. **        Assessment / Plan     Active Hospital Problems    Diagnosis POA   • **Retroperitoneal mass [R19.00] Yes   • Essential hypertension [I10] Yes   • GERD (gastroesophageal reflux disease) [K21.9] Yes   • Anxiety and depression [F41.9, F32.9] Yes   • Tobacco abuse [Z72.0] Yes   • 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) [C50.912] Yes   • Current every day smoker [F17.200] Yes     Marianne Rodriguez is a 44 y.o. female with PMH significant for HTN, GERD, anxiety/depression, peripheral neuropathy and cervical dysplasia. 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 She underwent mastectomy, chemotherapy and radiation. She was followed by Dr. Avila of [place] but was discharged from the practice after missing too many appointments. She established care with Dr. Charlotte Mann. An MRI had recently been obtained at  and she was found to have a cystic lesion in the lower pelvis. She was referred to Dr. Luque for further evaluation.     She was admitted to [facility] on 6/13/19 for resection of a retroperitoneal tumor with interoperative ultrasound. Frozen section showed possible inflammatory process as opposed to a true malignancy. Hospital medicine consulted post-operatively for medical management.     Retroperitoneal mass  History of L breast angiosarcoma  - s/p resection of retroperitoneal mass 6/13/19 - follow pathology   - Pain control PRN, limited by hypotension   - Encourage ambulation and IS   - IV fluids, diet advanced today   - IV Zosyn x 3 doses - to be completed  today     HTN  - currently borderline hypotensive. Hold home Lisinopril for now - looks like she got about 40mg in the last 24 hours which plus pain meds, is likely causing her hypotension     GERD, protonix  Anxiety/depression, continue home medications  Ongoing tobacco abuse, encourage cessation     Thank you for allowing [facility] to provide consultative care for your patient, we will continue to follow while clinically appropriate.    Electronically signed by Lauren Bishop PA-C, 06/14/19, 12:49 PM.

## 2019-06-14 NOTE — CONSULTS
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   PSxH: She  has a past surgical history that includes Laparoscopic cholecystectomy (2012); Tubal ligation (2002); LEEP (05/2017); Mastectomy (Left, 09/2017); LASIK; and Exploratory Laparotomy (N/A, 6/13/2019).     Other nutrition related factors/ medical procedures/ tests since admission:  (6/13) s/p radical resection of retroperitoneal tumor    Reported/Observed/Food/Nutrition Related History:   Pt sitting up in bed. Family members @ bedside- pt reports she is hungry- tolerated clears with no issues. Pt reported she has probably had some weight loss over the past few months. Pt reports that she has lost ~ 10 lb over the past 8-9 months. Pt UBW prior to chemo and radiation was 126 lb. Pt stated she has experienced a loss in appetite, no taste changes, no N/V. Pt stated she is ready for solid food. She would like to try ONS while inpatient. Pt reported no known food allergies at this time.     -RD attempted to obtain bed scale weight when present, Bed scale would not weigh out.     Anthropometrics     Height: 162.6 cm (64\")  Last filed wt: 117 lb @ MD office visit (standing scale) on 6/11    BMI: 20.14 kg/m2   Normal: 18.5-24.9kg/m2    Ideal Body Weight (IBW) (kg): 55    Weight Change   UBW: 126 lb     Weight change: 9 lb    % wt change: 7.1%    Time " frame of weight loss: ~ 8-9 months per pt     Labs reviewed   Yes   Results from last 7 days   Lab Units 06/14/19  0500 06/11/19  0914   GLUCOSE mg/dL 145* 96   BUN mg/dL 8 11   CREATININE mg/dL 0.42* 0.54*   SODIUM mmol/L 137 141   CHLORIDE mmol/L 100 101   POTASSIUM mmol/L 4.2 4.5     Medications reviewed   Pertinent: neurontin, protonix, zosyn, senokot  GTT: IVF @ 100 mL/hr  PRN: dilaudid, atarax, milk of magnesia, zofran, percocet, phenergan    Current Nutrition Prescription     PO: Diet Regular; GI Soft/Howe    Intake:  Insufficient data     Nutrition Diagnosis     6/14  Problem Predicted suboptimal energy intake   Etiology Peritoneal ca    Signs/Symptoms Pt reports loss of thaddeus/ reduced oral intake > 1 month      Nutrition Intervention   1.  Follow treatment progress, Care plan reviewed  2. Interview for preferences, Encourage intake, Supplement provided   3. Boost Plus TID     Goal:   General: Nutrition support treatment  PO: Increase intake, Advance diet as medically appropriate  Additional goals: no further significant weight loss, pt is able to consume > or equal to 67% of meal trays.   Monitoring/Evaluation:   Per protocol, PO intake, Supplement intake, Pertinent labs, Weight, GI status, Symptoms      Will Continue to follow per protocol      Dunia Hdez RD  Time Spent: 30 minutes

## 2019-06-14 NOTE — THERAPY EVALUATION
Acute Care - Physical Therapy Initial Evaluation  [facility]     Patient Name: Marianne Rodriguez  : 1975  MRN: [id number]  Today's Date: 2019   Onset of Illness/Injury or Date of Surgery: 19  Date of Referral to PT: 19  Referring Physician: Tad Rodriguez MD      Admit Date: 2019    Visit Dx:     ICD-10-CM ICD-9-CM   1. 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)   • Current every day smoker   • Retroperitoneal mass   • Essential hypertension   • GERD (gastroesophageal reflux disease)   • Anxiety and depression   • Tobacco abuse     Past Medical History:   Diagnosis Date   • Abdominal mass    • Anxiety and depression    • Breast cancer (CMS/HCC)     Angisarcoma of the breast ~ Aug/Sept 2017 staus post Chemotherapy/Radiationtherapy   • Current every day smoker    • Gallstones    • GERD (gastroesophageal reflux disease)    • History of transfusion        • Hypertension      Past Surgical History:   Procedure Laterality Date   • EXPLORATORY LAPAROTOMY N/A 2019    Procedure: RADICAL RESECTION OF RETROPERITONEAL TUMOR, INTRAOPERATIVE ULTRASOUND;  Surgeon: Tad Luque MD;  Location: [facility];  Service: General   • LAPAROSCOPIC CHOLECYSTECTOMY     • LASIK     • LEEP  2017   • MASTECTOMY Left 2017   • TUBAL ABDOMINAL LIGATION          PT ASSESSMENT (last 12 hours)      Physical Therapy Evaluation     Row Name 19 0933          PT Evaluation Time/Intention    Subjective Information  complains of;pain  -GABRIELLA     Document Type  evaluation  -GABRIELLA     Mode of Treatment  individual therapy  -GABRIELLA     Patient Effort  excellent  -GABRIELLA     Symptoms Noted During/After Treatment  none  -GABRIELLA     Comment  Pt c/o abdominal pain throughout from surgery.   -GABRIELLA     Row Name 19 0933          General Information    Patient Profile Reviewed?  yes  -GABRIELLA     Onset of Illness/Injury  or Date of Surgery  06/13/19  -GABRIELLA     Referring Physician  Tad Rodriguez MD  -GABRIELLA     Patient Observations  alert;cooperative;agree to therapy  -GABRIELLA     Patient/Family Observations  No visitors present.  -GABRIELLA     General Observations of Patient  Pt in bed.   -GABRIELLA     Prior Level of Function  independent:;all household mobility;ADL's  -GABRIELLA     Equipment Currently Used at Home  none  -GABRIELLA     Pertinent History of Current Functional Problem  Pt with hx of angiosarcoma of L breast, underwent masectomy a few years ago. Pt found to have cystic lesion leading to R psoas resection this hospitalization.   -GABRIELLA     Existing Precautions/Restrictions  fall;other (see comments) Pt  under L arm and around masectomy site.   -GABRIELLA     Risks Reviewed  patient:;nausea/vomiting;LOB;dizziness;increased discomfort;lines disloged  -GABRIELLA     Benefits Reviewed  patient:;improve function;increase independence;increase strength;increase knowledge  -GABRIELLA     Barriers to Rehab  medically complex  -GABRIELLA     Row Name 06/14/19 0933          Relationship/Environment    Primary Source of Support/Comfort  parent  -GABRIELLA     Lives With  parent(s)  -GABRIELLA     Family Caregiver if Needed  parent(s) Pt's mother  -GABRIELLA     Row Name 06/14/19 0933          Resource/Environmental Concerns    Current Living Arrangements  home/apartment/condo  -GABRIELLA     Resource/Environmental Concerns  home accessibility  -GABRIELLA     Home Accessibility Concerns  stairs to enter home;stairs to access bedroom or bathroom  -GABRIELLA     Row Name 06/14/19 0933          Home Main Entrance    Number of Stairs, Main Entrance  one  -GABRIELLA     Stair Railings, Main Entrance  none  -GABRIELLA     Row Name 06/14/19 0933          Stairs Within Home, Primary    Number of Stairs, Within Home, Primary  one  -GABRIELLA     Stair Railings, Within Home, Primary  none  -GABRIELLA     Row Name 06/14/19 0933          Cognitive Assessment/Interventions    Additional Documentation  Cognitive Assessment/Intervention (Group)  -GABRIELLA     Keith  Name 06/14/19 0933          Cognitive Assessment/Intervention- PT/OT    Affect/Mental Status (Cognitive)  WFL  -GABRIELLA     Orientation Status (Cognition)  oriented x 4  -GABRIELLA     Follows Commands (Cognition)  follows one step commands;over 90% accuracy  -GABRIELLA     Cognitive Function (Cognitive)  WFL  -GABRIELLA     Personal Safety Interventions  fall prevention program maintained;gait belt;muscle strengthening facilitated;nonskid shoes/slippers when out of bed  -GABRIELLA     Row Name 06/14/19 0933          Bed Mobility Assessment/Treatment    Bed Mobility Assessment/Treatment  rolling left;scooting/bridging;rolling right;supine-sit;sit-supine  -GABRIELLA     Rolling Left [place] (Bed Mobility)  independent  -GABRIELLA     Rolling Right [place] (Bed Mobility)  independent  -GABRIELLA     Scooting/Bridging Kelly (Bed Mobility)  minimum assist (75% patient effort)  -GABRIELLA     Supine-Sit [place] (Bed Mobility)  minimum assist (75% patient effort)  -GABRIELLA     Sit-Supine Kelly (Bed Mobility)  minimum assist (75% patient effort)  -GABRIELLA     Bed Mobility, Safety Issues  decreased use of arms for pushing/pulling;decreased use of legs for bridging/pushing  -GABRIELLA     Assistive Device (Bed Mobility)  head of bed elevated  -GABRIELLA     Comment (Bed Mobility)  Mobility mainly limited by pain.  -GABRIELLA     Row Name 06/14/19 0933          Transfer Assessment/Treatment    Transfer Assessment/Treatment  sit-stand transfer;stand-sit transfer  -GABRIELLA     Sit-Stand Kelly (Transfers)  contact guard  -GABRIELLA     Stand-Sit Kelly (Transfers)  contact guard  -GABRIELLA     Row Name 06/14/19 0933          Sit-Stand Transfer    Assistive Device (Sit-Stand Transfers)  walker, front-wheeled  -GABRIELLA     Row Name 06/14/19 0933          Stand-Sit Transfer    Assistive Device (Stand-Sit Transfers)  walker, front-wheeled  -GABRIELLA     Row Name 06/14/19 0933          Gait/Stairs Assessment/Training    57886 - Gait Training Minutes   5  -GABRIELLA     Gait/Stairs Assessment/Training  gait/ambulation  independence;distance ambulated;gait/ambulation assistive device;gait pattern  -     [place] Level (Gait)  contact guard  -GABRIELLA     Assistive Device (Gait)  walker, front-wheeled  -GABRIELLA     Distance in Feet (Gait)  10  -GABRIELLA     Pattern (Gait)  swing-through  -GABRIELLA     Deviations/Abnormal Patterns (Gait)  bilateral deviations;stride length decreased;gait speed decreased  -GABRIELLA     Bilateral Gait Deviations  forward flexed posture  -GABRIELLA     Comment (Gait/Stairs)  Pt pushed RW for support with L UE, held abdomen with R UE due to pain. Distance primarily limited by pain.   -     Row Name 06/14/19 0933          MMT (Manual Muscle Testing)    Rt Lower Ext  Rt Ankle Dorsiflexion 5/5  -GABRIELLA     Lt Lower Ext  Lt Ankle Dorsiflexion 5/5  -     General MMT Comments  Knee flexion and extension 4/5 Bilaterally. Pt R side more painful but equal in strength.   -[facility] Name 06/14/19 0933          MMT Right Lower Ext    Rt Ankle Dorsiflexion MMT, Gross Movement  (5/5) normal  -[facility] Name 06/14/19 0933          MMT Left Lower Ext    Lt Ankle Dorsiflexion MMT, Gross Movement  (5/5) normal  -[facility] Name 06/14/19 0933          Motor Assessment/Intervention    Additional Documentation  Balance (Group);Therapeutic Exercise (Group)  -[facility] Name 06/14/19 0933          Therapeutic Exercise    87515 - PT Therapeutic Activity Minutes  18  -[facility] Name 06/14/19 0933          Balance    Balance  static sitting balance;static standing balance  -[facility] Name 06/14/19 0933          Static Sitting Balance    Level of [place] (Unsupported Sitting, Static Balance)  supervision  -     Sitting Position (Unsupported Sitting, Static Balance)  sitting on edge of bed  -GABRIELLA     Time Able to Maintain Position (Unsupported Sitting, Static Balance)  2 to 3 minutes  -     Row Name 06/14/19 0933          Static Standing Balance    Level of [place] (Supported Standing, Static Balance)  contact guard assist  -GABRIELLA     Time Able to  Maintain Position (Supported Standing, Static Balance)  30 to 45 seconds  -GABRIELLA     Assistive Device Utilized (Supported Standing, Static Balance)  walker, rolling  -GABRIELLA     Row Name 06/14/19 0933          Sensory Assessment/Intervention    Sensory General Assessment  -- R thigh still numb. Below knee can feel but feels funny on R  -GABRIELLA     Row Name 06/14/19 0933          Pain Assessment    Additional Documentation  Pain Scale: Numbers Pre/Post-Treatment (Group)  -GABRIELLA     Row Name 06/14/19 0933          Pain Scale: Numbers Pre/Post-Treatment    Pain Scale: Numbers, Pretreatment  7/10  -GABRIELLA     Pain Scale: Numbers, Post-Treatment  7/10  -GABRIELLA     Pain Location - Side  Bilateral  -GABRIELLA     Pain Location - Orientation  incisional  -GABRIELLA     Pain Location  abdomen  -GABRIELLA     Pre/Post Treatment Pain Comment  per pt: pt had recieved pain medication prior to session.   -GABRIELLA     Pain Intervention(s)  Repositioned;Ambulation/increased activity  -GABRIELLA     Row Name             Wound 06/13/19 1516 Other (See comments) abdomen incision    Wound - Properties Group Date first assessed: 06/13/19  - Time first assessed: 1516  -DH Side: Other (See comments)  -DH Location: abdomen  -DH Type: incision  -DH    Row Name 06/14/19 0933          Coping    Observed Emotional State  calm;cooperative  -GABRIELLA     Verbalized Emotional State  acceptance  -GABRIELLA     Row Name 06/14/19 0933          Plan of Care Review    Plan of Care Reviewed With  patient  -GABRIELLA     Row Name 06/14/19 0933          Physical Therapy Clinical Impression    Date of Referral to PT  06/14/19  -GABRIELLA     PT Diagnosis (PT Clinical Impression)  Impaired functional mobility s/p R psoas resection.   -GABRIELLA     Criteria for Skilled Interventions Met (PT Clinical Impression)  yes;treatment indicated  -GABRIELLA     Pathology/Pathophysiology Noted (Describe Specifically for Each System)  musculoskeletal;neuromuscular  -GABRIELLA     Impairments Found (describe specific impairments)  aerobic capacity/endurance;gait,  locomotion, and balance  -GABRIELLA     Rehab Potential (PT Clinical Summary)  good, to achieve stated therapy goals  -GABRIELLA     Care Plan Review (PT)  evaluation/treatment results reviewed;care plan/treatment goals reviewed;risks/benefits reviewed;current/potential barriers reviewed;patient/other agree to care plan  -     Row Name 06/14/19 0933          Physical Therapy Goals    Bed Mobility Goal Selection (PT)  bed mobility, PT goal 1  -GABRIELLA     Transfer Goal Selection (PT)  transfer, PT goal 1  -GABRIELLA     Gait Training Goal Selection (PT)  gait training, PT goal 1  -GABRIELLA     Stairs Goal Selection (PT)  stairs, PT goal 1  -GABRIELLA     Additional Documentation  Stairs Goal Selection (PT) (Row)  -     Row Name 06/14/19 0933          Bed Mobility Goal 1 (PT)    Activity/Assistive Device (Bed Mobility Goal 1, PT)  sit to supine/supine to sit  -GABRIELLA     [place] Level/Cues Needed (Bed Mobility Goal 1, PT)  conditional independence  -GABRIELLA     Time Frame (Bed Mobility Goal 1, PT)  2 weeks  -     Row Name 06/14/19 0933          Transfer Goal 1 (PT)    Activity/Assistive Device (Transfer Goal 1, PT)  sit-to-stand/stand-to-sit  -GABRIELLA     [place] Level/Cues Needed (Transfer Goal 1, PT)  conditional independence  -GABRIELLA     Time Frame (Transfer Goal 1, PT)  2 weeks  -     Row Name 06/14/19 0933          Gait Training Goal 1 (PT)    Activity/Assistive Device (Gait Training Goal 1, PT)  gait (walking locomotion)  -GABRIELLA     [place] Level (Gait Training Goal 1, PT)  conditional independence  -GABRIELLA     Distance (Gait Goal 1, PT)  200  -AGBRIELLA     Time Frame (Gait Training Goal 1, PT)  2 weeks  -     Row Name 06/14/19 0933          Stairs Goal 1 (PT)    Activity/Assistive Device (Stairs Goal 1, PT)  ascending stairs;descending stairs  -GABRIELLA     [place] Level/Cues Needed (Stairs Goal 1, PT)  conditional independence  -GABRIELLA     Number of Stairs (Stairs Goal 1, PT)  1  -GABRIELLA     Time Frame (Stairs Goal 1, PT)  2 weeks  -     Row Name 06/14/19 0933           Positioning and Restraints    Pre-Treatment Position  in bed  -GABRIELLA     Post Treatment Position  bed  -GABRIELLA     In Bed  notified nsg;supine;encouraged to call for assist;exit alarm on;call light within reach  -     Row Name 06/14/19 0933          Living Environment    Home Accessibility  stairs to enter home;stairs within home  -GABRIELLA       User Key  (r) = Recorded By, (t) = Taken By, (c) = Cosigned By    Initials Name Provider Type     Gin Carrillo RN Registered Nurse    Kaya Sorenson, ELVIE Physical Therapist        Physical Therapy Education     Title: PT OT SLP Therapies (In Progress)     Topic: Physical Therapy (In Progress)     Point: Mobility training (Done)     Learning Progress Summary           Patient Acceptance, E, VU,NR by GABRIELLA at 6/14/2019  9:33 AM                   Point: Body mechanics (Done)     Learning Progress Summary           Patient Acceptance, E, VU,NR by GABRIELLA at 6/14/2019  9:33 AM                   Point: Precautions (Done)     Learning Progress Summary           Patient Acceptance, E, VU,NR by GABRIELLA at 6/14/2019  9:33 AM                               User Key     Initials Effective Dates Name Provider Type Discipline     08/30/18 -  Kaya Wheeler PT Physical Therapist PT              PT Recommendation and Plan  Anticipated Discharge Disposition (PT): home with assist, home with home health  Therapy Frequency (PT Clinical Impression): daily  Outcome Summary/Treatment Plan (PT)  Anticipated Discharge Disposition (PT): home with assist, home with home health  Plan of Care Reviewed With: patient  Progress: improving  Outcome Summary: PT evaluation completed. Pt presents with decrease in functional mobility s/p R psoas resection. Pt to benefit from IP PT services to return to PLOF. Pt was primarily limited by pain during session, but was able to ambulated 10' CGA using RW. Pt stated her R thigh is still numb but pt was able to feel light tough below knees B. Pt says R medial lower  "leg still feels \"funny\" but able to identify light touch. Pt LE strength was equal bilaterally. Recommend home with assist and possibly  PT for strengthening if pt agreeable. Pt may benefit from front wheeled walker upon DC home.    Outcome Measures     Row Name 06/14/19 0933             How much help from another person do you currently need...    Turning from your back to your side while in flat bed without using bedrails?  4  -GABRIELLA      Moving from lying on back to sitting on the side of a flat bed without bedrails?  3  -GABRIELLA      Moving to and from a bed to a chair (including a wheelchair)?  3  -GABRIELLA      Standing up from a chair using your arms (e.g., wheelchair, bedside chair)?  3  -GABRIELLA      Climbing 3-5 steps with a railing?  2  -GABRIELLA      To walk in hospital room?  3  -GABRIELLA      AM-PAC 6 Clicks Score  18  -GABRIELLA         Functional Assessment    Outcome Measure Options  AM-PAC 6 Clicks Basic Mobility (PT)  -GABRIELLA        User Key  (r) = Recorded By, (t) = Taken By, (c) = Cosigned By    Initials Name Provider Type    Kaya Sorenson, PT Physical Therapist         Time Calculation:   PT Charges     Row Name 06/14/19 0933             Time Calculation    Start Time  0933  -GABRIELLA      PT Received On  06/14/19  -GABRIELLA      PT Goal Re-Cert Due Date  06/24/19  -GABRIELLA         Timed Charges    15645 - Gait Training Minutes   5  -GABRIELLA      56712 - PT Therapeutic Activity Minutes  18  -GABRIELLA        User Key  (r) = Recorded By, (t) = Taken By, (c) = Cosigned By    Initials Name Provider Type    Kaya Sorenson PT Physical Therapist        Therapy Charges for Today     Code Description Service Date Service Provider Modifiers Qty    64011863929 HC GAIT TRAINING EA 15 MIN 6/14/2019 Kaya Wheeler, PT GP 1    65117138030 HC PT THERAPEUTIC ACT EA 15 MIN 6/14/2019 Kaya Wheeler, PT GP 1    [id number]  PT EVAL MOD COMPLEXITY 4 6/14/2019 Kaya Wheeler, PT GP 1          PT G-Codes  Outcome Measure Options: AM-PAC 6 Clicks Basic Mobility " (PT)  AM-[facility] 6 Clicks Score: 18      Kaya Wheeler, PT  6/14/2019

## 2019-06-14 NOTE — PROGRESS NOTES
"General Surgery Daily Progress Note    06/14/19    Marianne Rodriguez  [id number]  1975    1 Day Post-Op    Reason for Evaluation: Retroperitoneal mass  Subjective:  Pain is still not ideally controlled.  Patient is ambulating however.  Continues to have no symptoms of infection    Objective:  /59   Pulse 83   Temp 97.4 °F (36.3 °C) (Oral)   Resp 16   Ht 162.6 cm (64\")   Wt 61 kg (134 lb 8 oz)   LMP 06/10/2019   SpO2 100%   BMI 23.09 kg/m²       INTAKE/OUTPUT      Intake/Output Summary (Last 24 hours) at 6/14/2019 1531  Last data filed at 6/14/2019 0401  Gross per 24 hour   Intake 1050 ml   Output 300 ml   Net 750 ml       General Appearance: No acute distress  Eyes: Anicteric  Neck: Trachea midline   Cardiovascular:  RRR  Lungs:  Bilateral respirations unlabored   Abdomen: Soft with significant incisional tenderness  Extremities:  No cyanosis or edema   Skin: Normal color  Neurologic: awake and conversant   Surgical Site: No signs of infection      Imaging Results (last 24 hours)     ** No results found for the last 24 hours. **          Labs:  Lab Results (last 24 hours)     Procedure Component Value Units Date/Time    Tissue Pathology Exam [[id number]] Collected:  06/13/19 1459    Specimen:  Tissue from Retroperitoneum, Tissue from Retroperitoneum Updated:  06/14/19 1302     Case Report --     Surgical Pathology Report                         Case: [id number]                                  Authorizing Provider:  Tad Luque,   Collected:           06/13/2019 02:59 PM                                 MD                                                                           Ordering Location:     [facility]   Received:            06/13/2019 04:09 PM                                 OR                                                                           Pathologist:           Jeremy Calixto MD                                                        Specimens:  "  1) - Retroperitoneum, RETROPERITONEAL MASS                                                          2) - Retroperitoneum, EXTENDED DEEP MARGIN, STITCH FELIPE DEEP MARGIN                        Clinical Information --     The working history is retroperitoneal mass.        Final Diagnosis --     1. RETROPERITONEAL MASS, EXCISION:  Acute and subacute inflammation with organized fat necrosis and focal areas of necrosis with apparent abscess formation (see Comment).  Skeletal muscle with inflammatory changes   Negative for tumor and granuloma.  2. EXTENDED DEEP MARGIN:  Skeletal muscle and adipose tissue with subacute inflammatory changes.    PCC/mbc        Comment --     There are significant inflammatory changes seen affecting both adipose and skeletal muscle tissue.  Focally, there is fat necrosis.  Focal areas of necrosis with acute inflammation are present consistent with abscess.  No evidence of tumor or granuloma is identified.  Patient's prior history of angiosarcoma is noted.       Intraoperative Consultation --     Frozen section diagnosis: Inflammatory process (DGD)       Gross Description --     Specimen 1 received in formalin labeled as retroperitoneal mass is a 30.2 gram, 6.5 x 6.5 x 3.8 cm intact fibromuscular soft tissue mass. The outer surface is inked blue and sectioning reveals predominantly red/brown muscle with a 3.0 x 2.5 x 2.0 cm ill-defined area which is tan/pink and fibrofatty. No well circumscribed mass or area of necrosis is identified. Representative sections are submitted for frozen section, now resubmitted in cassette A. Additional representative sections to include the entirety of the fibrofatty area are submitted in cassettes 1B-H.     Specimen 2 received in formalin labeled as extended deep margin, stitch marks deep margin is a 3.0 x 2.5 x 2.0 cm portion of red/brown fibrofatty soft tissue. There is a suture designating the new deep margin, inked blue. Sectioning reveals a yellow/tan  fibrofatty cut surface with inner mixed areas of red/brown muscle. The specimen is submitted entirely in cassettes 2A-E.  HBM/klb          Microscopic Description --     The slides are reviewed and demonstrate histopathologic features supporting the above rendered diagnosis.         CBC & Differential [[id number]] Collected:  06/14/19 0500    Specimen:  Blood Updated:  06/14/19 0838    Narrative:       The following orders were created for panel order CBC & Differential.  Procedure                               Abnormality         Status                     ---------                               -----------         ------                     Scan Slide[[id number]]                   Normal              Final result               CBC Auto Differential[[id number]]        Abnormal            Final result                 Please view results for these tests on the individual orders.    CBC Auto Differential [[id number]]  (Abnormal) Collected:  06/14/19 0500    Specimen:  Blood Updated:  06/14/19 0838     WBC 13.94 10*3/mm3      RBC 3.26 10*6/mm3      Hemoglobin 9.7 g/dL      Hematocrit 30.5 %      MCV 93.6 fL      MCH 29.8 pg      MCHC 31.8 g/dL      RDW 14.6 %      RDW-SD 50.4 fl      MPV 9.1 fL      Platelets 474 10*3/mm3      Neutrophil % 90.1 %      Lymphocyte % 4.4 %      Monocyte % 4.9 %      Eosinophil % 0.0 %      Basophil % 0.1 %      Immature Grans % 0.5 %      Neutrophils, Absolute 12.56 10*3/mm3      Lymphocytes, Absolute 0.61 10*3/mm3      Monocytes, Absolute 0.68 10*3/mm3      Eosinophils, Absolute 0.00 10*3/mm3      Basophils, Absolute 0.02 10*3/mm3      Immature Grans, Absolute 0.07 10*3/mm3      nRBC 0.0 /100 WBC     Scan Slide [[id number]]  (Normal) Collected:  06/14/19 0500    Specimen:  Blood Updated:  06/14/19 0838     RBC Morphology Normal     WBC Morphology Normal     Platelet Morphology Normal    Basic Metabolic Panel [[id number]]  (Abnormal) Collected:  06/14/19 0500    Specimen:  Blood Updated:   06/14/19 0657     Glucose 145 mg/dL      BUN 8 mg/dL      Creatinine 0.42 mg/dL      Sodium 137 mmol/L      Potassium 4.2 mmol/L      Chloride 100 mmol/L      CO2 25.0 mmol/L      Calcium 8.5 mg/dL      eGFR Non African Amer >150 mL/min/1.73      BUN/Creatinine Ratio 19.0     Anion Gap 12.0 mmol/L     Narrative:       GFR Normal >60  Chronic Kidney Disease <60  Kidney Failure <15            Assessment:  Retroperitoneal mass now felt to be consistent pathologically with chronic inflammatory reaction or abscess despite patient's lack of infectious symptoms.    Plan:   Given the lack of a clinical infection I think we can discontinue her antibiotics and follow her clinically.  Going to try Toradol and discontinue her Celebrex to see if perhaps this helps her through the next 24 to 36 hours.  Awaiting return of bowel function    Tad Luque MD - 6/14/2019, 3:31 PM

## 2019-06-15 LAB
ANION GAP SERPL CALCULATED.3IONS-SCNC: 7 MMOL/L
BUN BLD-MCNC: 15 MG/DL (ref 6–20)
BUN/CREAT SERPL: 30 (ref 7–25)
CALCIUM SPEC-SCNC: 8.6 MG/DL (ref 8.6–10.5)
CHLORIDE SERPL-SCNC: 105 MMOL/L (ref 98–107)
CO2 SERPL-SCNC: 28 MMOL/L (ref 22–29)
CREAT BLD-MCNC: 0.5 MG/DL (ref 0.57–1)
DEPRECATED RDW RBC AUTO: 52.8 FL (ref 37–54)
ERYTHROCYTE [DISTWIDTH] IN BLOOD BY AUTOMATED COUNT: 15 % (ref 12.3–15.4)
GFR SERPL CREATININE-BSD FRML MDRD: 135 ML/MIN/1.73
GLUCOSE BLD-MCNC: 99 MG/DL (ref 65–99)
HCT VFR BLD AUTO: 27.9 % (ref 34–46.6)
HGB BLD-MCNC: 8.7 G/DL (ref 12–15.9)
MCH RBC QN AUTO: 29.9 PG (ref 26.6–33)
MCHC RBC AUTO-ENTMCNC: 31.2 G/DL (ref 31.5–35.7)
MCV RBC AUTO: 95.9 FL (ref 79–97)
PLATELET # BLD AUTO: 431 10*3/MM3 (ref 140–450)
PMV BLD AUTO: 8.9 FL (ref 6–12)
POTASSIUM BLD-SCNC: 4.1 MMOL/L (ref 3.5–5.2)
RBC # BLD AUTO: 2.91 10*6/MM3 (ref 3.77–5.28)
SODIUM BLD-SCNC: 140 MMOL/L (ref 136–145)
WBC NRBC COR # BLD: 8.89 10*3/MM3 (ref 3.4–10.8)

## 2019-06-15 PROCEDURE — 99232 SBSQ HOSP IP/OBS MODERATE 35: CPT | Performed by: INTERNAL MEDICINE

## 2019-06-15 PROCEDURE — 85027 COMPLETE CBC AUTOMATED: CPT | Performed by: SURGERY

## 2019-06-15 PROCEDURE — 25010000002 ENOXAPARIN PER 10 MG: Performed by: SURGERY

## 2019-06-15 PROCEDURE — 25010000002 KETOROLAC TROMETHAMINE PER 15 MG: Performed by: SURGERY

## 2019-06-15 PROCEDURE — 25010000002 HYDROMORPHONE 1 MG/ML SOLUTION: Performed by: SURGERY

## 2019-06-15 PROCEDURE — 80048 BASIC METABOLIC PNL TOTAL CA: CPT | Performed by: SURGERY

## 2019-06-15 RX ORDER — ALBUTEROL SULFATE 2.5 MG/3ML
2.5 SOLUTION RESPIRATORY (INHALATION) 4 TIMES DAILY PRN
Status: DISCONTINUED | OUTPATIENT
Start: 2019-06-15 | End: 2019-06-18 | Stop reason: HOSPADM

## 2019-06-15 RX ORDER — CELECOXIB 200 MG/1
200 CAPSULE ORAL EVERY 12 HOURS SCHEDULED
Status: DISCONTINUED | OUTPATIENT
Start: 2019-06-15 | End: 2019-06-18 | Stop reason: HOSPADM

## 2019-06-15 RX ORDER — MAGNESIUM SULFATE HEPTAHYDRATE 40 MG/ML
4 INJECTION, SOLUTION INTRAVENOUS AS NEEDED
Status: DISCONTINUED | OUTPATIENT
Start: 2019-06-15 | End: 2019-06-18 | Stop reason: HOSPADM

## 2019-06-15 RX ORDER — DIAZEPAM 2 MG/1
4 TABLET ORAL EVERY 12 HOURS
Status: DISCONTINUED | OUTPATIENT
Start: 2019-06-15 | End: 2019-06-17

## 2019-06-15 RX ORDER — MAGNESIUM SULFATE HEPTAHYDRATE 40 MG/ML
2 INJECTION, SOLUTION INTRAVENOUS AS NEEDED
Status: DISCONTINUED | OUTPATIENT
Start: 2019-06-15 | End: 2019-06-18 | Stop reason: HOSPADM

## 2019-06-15 RX ORDER — MAGNESIUM SULFATE 1 G/100ML
1 INJECTION INTRAVENOUS AS NEEDED
Status: DISCONTINUED | OUTPATIENT
Start: 2019-06-15 | End: 2019-06-18 | Stop reason: HOSPADM

## 2019-06-15 RX ORDER — SODIUM CHLORIDE 9 MG/ML
50 INJECTION, SOLUTION INTRAVENOUS CONTINUOUS
Status: DISCONTINUED | OUTPATIENT
Start: 2019-06-15 | End: 2019-06-17

## 2019-06-15 RX ADMIN — OXYCODONE HYDROCHLORIDE AND ACETAMINOPHEN 1 TABLET: 10; 325 TABLET ORAL at 03:44

## 2019-06-15 RX ADMIN — OXYCODONE HYDROCHLORIDE AND ACETAMINOPHEN 1 TABLET: 7.5; 325 TABLET ORAL at 08:17

## 2019-06-15 RX ADMIN — SENNOSIDES,DOCUSATE SODIUM 2 TABLET: 8.6; 5 TABLET, FILM COATED ORAL at 08:17

## 2019-06-15 RX ADMIN — DIAZEPAM 4 MG: 2 TABLET ORAL at 19:59

## 2019-06-15 RX ADMIN — HYDROMORPHONE HYDROCHLORIDE 1 MG: 1 INJECTION, SOLUTION INTRAMUSCULAR; INTRAVENOUS; SUBCUTANEOUS at 15:03

## 2019-06-15 RX ADMIN — GABAPENTIN 600 MG: 300 CAPSULE ORAL at 14:38

## 2019-06-15 RX ADMIN — SENNOSIDES,DOCUSATE SODIUM 2 TABLET: 8.6; 5 TABLET, FILM COATED ORAL at 19:59

## 2019-06-15 RX ADMIN — OXYCODONE HYDROCHLORIDE AND ACETAMINOPHEN 1 TABLET: 5; 325 TABLET ORAL at 12:17

## 2019-06-15 RX ADMIN — CELECOXIB 200 MG: 200 CAPSULE ORAL at 08:17

## 2019-06-15 RX ADMIN — POLYETHYLENE GLYCOL 3350 17 G: 17 POWDER, FOR SOLUTION ORAL at 19:59

## 2019-06-15 RX ADMIN — CELECOXIB 200 MG: 200 CAPSULE ORAL at 19:58

## 2019-06-15 RX ADMIN — PANTOPRAZOLE SODIUM 40 MG: 40 TABLET, DELAYED RELEASE ORAL at 06:01

## 2019-06-15 RX ADMIN — KETOROLAC TROMETHAMINE 30 MG: 30 INJECTION, SOLUTION INTRAMUSCULAR at 03:44

## 2019-06-15 RX ADMIN — HYDROMORPHONE HYDROCHLORIDE 1 MG: 1 INJECTION, SOLUTION INTRAMUSCULAR; INTRAVENOUS; SUBCUTANEOUS at 01:39

## 2019-06-15 RX ADMIN — DULOXETINE HYDROCHLORIDE 60 MG: 60 CAPSULE, DELAYED RELEASE ORAL at 08:17

## 2019-06-15 RX ADMIN — GABAPENTIN 600 MG: 300 CAPSULE ORAL at 06:01

## 2019-06-15 RX ADMIN — DIAZEPAM 4 MG: 2 TABLET ORAL at 08:17

## 2019-06-15 RX ADMIN — SODIUM CHLORIDE 50 ML/HR: 9 INJECTION, SOLUTION INTRAVENOUS at 18:32

## 2019-06-15 RX ADMIN — ENOXAPARIN SODIUM 40 MG: 40 INJECTION SUBCUTANEOUS at 08:17

## 2019-06-15 RX ADMIN — OXYCODONE HYDROCHLORIDE AND ACETAMINOPHEN 1 TABLET: 10; 325 TABLET ORAL at 19:03

## 2019-06-15 RX ADMIN — HYDROMORPHONE HYDROCHLORIDE 1 MG: 1 INJECTION, SOLUTION INTRAMUSCULAR; INTRAVENOUS; SUBCUTANEOUS at 06:01

## 2019-06-15 RX ADMIN — GABAPENTIN 600 MG: 300 CAPSULE ORAL at 19:58

## 2019-06-15 RX ADMIN — OXYCODONE HYDROCHLORIDE AND ACETAMINOPHEN 1 TABLET: 10; 325 TABLET ORAL at 14:41

## 2019-06-15 NOTE — PROGRESS NOTES
[facility] Medicine Services  PROGRESS NOTE    Patient Name: Marianne Rodriguez  : 1975  MRN: [id number]    Date of Admission: 2019  Length of Stay: 2  Primary Care Physician: Doris Self APRN    Subjective   Subjective     CC:  Right retroperitoneal mass/abscess, s/p resection    HPI:  Abdominal pain, cramping. +flatus. No bm. No dyspnea. Working w/ incentive sal    Review of Systems  No headache, no fever    Otherwise ROS is negative except as mentioned in the HPI.    Objective   Objective     Vital Signs:   Temp:  [97.8 °F (36.6 °C)-98.7 °F (37.1 °C)] 98.7 °F (37.1 °C)  Heart Rate:  [] 101  Resp:  [16-20] 16  BP: (102-107)/(55-75) 107/68        Physical Exam:  Alert, nontoxic appearing but obviously ill post operatively, ox4  Ncat, oroph clear  rrr  Lungs clear  abd soft, +bs, mild distension, appropriate post op tenderness, incisions well healing  No cce  No extremity rash  Face symmetric, speech clear  Normal affect    Results Reviewed:  I have personally reviewed current lab, radiology, and data and agree.    Results from last 7 days   Lab Units 06/15/19  0554 19  0500 19  0914   WBC 10*3/mm3 8.89 13.94* 10.27   HEMOGLOBIN g/dL 8.7* 9.7* 11.6*   HEMATOCRIT % 27.9* 30.5* 38.2   PLATELETS 10*3/mm3 431 474* 518*     Results from last 7 days   Lab Units 06/15/19  0554 19  0500 19  0914   SODIUM mmol/L 140 137 141   POTASSIUM mmol/L 4.1 4.2 4.5   CHLORIDE mmol/L 105 100 101   CO2 mmol/L 28.0 25.0 26.0   BUN mg/dL 15 8 11   CREATININE mg/dL 0.50* 0.42* 0.54*   GLUCOSE mg/dL 99 145* 96   CALCIUM mg/dL 8.6 8.5* 9.5     Estimated Creatinine Clearance: 141.1 mL/min (A) (by C-G formula based on SCr of 0.5 mg/dL (L)).    Microbiology Results Abnormal     None          Imaging Results (last 24 hours)     ** No results found for the last 24 hours. **               I have reviewed the medications:  Scheduled Meds:  celecoxib 200 mg Oral Q12H    diazePAM 4 mg Oral Q12H   DULoxetine 60 mg Oral Daily   enoxaparin 40 mg Subcutaneous Daily   gabapentin 600 mg Oral Q8H   pantoprazole 40 mg Oral Q AM   sennosides-docusate sodium 2 tablet Oral BID     Continuous Infusions:   PRN Meds:.•  acetaminophen **OR** acetaminophen **OR** acetaminophen  •  albuterol  •  bisacodyl  •  HYDROmorphone **AND** naloxone  •  hydrOXYzine  •  magnesium hydroxide  •  magnesium sulfate **OR** magnesium sulfate in D5W 1g/100mL (PREMIX) **OR** magnesium sulfate  •  ondansetron **OR** ondansetron  •  oxyCODONE-acetaminophen  •  oxyCODONE-acetaminophen  •  oxyCODONE-acetaminophen  •  promethazine **OR** promethazine      Assessment/Plan   Assessment / Plan     Active Hospital Problems    Diagnosis POA   • **Retroperitoneal mass [R19.00] Yes   • Essential hypertension [I10] Yes   • GERD (gastroesophageal reflux disease) [K21.9] Yes   • Anxiety and depression [F41.9, F32.9] Yes   • Tobacco abuse [Z72.0] Yes   • 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 female with PMH significant for HTN, GERD, anxiety/depression, peripheral neuropathy and cervical dysplasia. 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 She underwent mastectomy, chemotherapy and radiation. She was followed by Dr. Avila of [place] but was discharged from the practice after missing too many appointments. She established care with Dr. Charlotte Mann. An MRI had recently been obtained at  and she was found to have a cystic lesion in the lower pelvis. She was referred to Dr. Luque for further evaluation. CT chest/abdomen/pelvis 5/31/19 showed abnormal soft tissue posterior to right psoas along right pelvic sidewall superiorly concerning for either infectious process or metastatic process.    Patient was admitted to [facility] on 6/13/19 and underwent  radical resection or right retroperitoneal tumor. Pathology was consistent w/ acute and subacute inflammation w/ organized fat necrosis and focal areas of necrosis w/ apparent abscess formation but negative for tumor or granuloma. hospitalists were consulted postoperatively for medical comanagement    *Retroperitoneal mass, consistent w/ chronic abscess of unclear etiology   -s/p radical resection right retroperitoneal mass (pathology c/w inflammation/chronic abscess)   -received 3 doses perioperative zosyn, now monitoring off antibiotics  *post operative ileus  *Hx angiosarcoma left breast    -s/p previous mastectomy, chemo, & xrt; follows w/ dr. Mann now  *hx HTN, currently borderline hypotension  *gerd  --------------------------------------------------------------------------    Plan:  -post op care & diet per Dr. Ott  -mobilize/pul toilet  -monitor off abx; monitor wbc, temp  -holding lisinopril (on 20mg po daily at home), restart prn  -ns 50cc/hr    -am labs: cbc,iron labs,bmp,mag    Dispo: -depending on clinical course and Dr. Ott recs, possible d/c home Monday       DVT Prophylaxis:  Sq lovenox      CODE STATUS:   Code Status and Medical Interventions:   Ordered at: 06/13/19 1750     Level Of Support Discussed With:    Patient     Code Status:    CPR     Medical Interventions (Level of Support Prior to Arrest):    Full         Electronically signed by Rudolph Dumont MD, 06/15/19, 3:37 PM.

## 2019-06-15 NOTE — PROGRESS NOTES
"General Surgery Daily Progress Note    06/15/19    Marianne Rodriguez  [id number]  1975    2 Days Post-Op    Reason for Evaluation: Retroperitoneal mass  Subjective:  Ambulating and taking small amounts of food.  Has early satiety.  Significant postoperative pain but controlled with current regimen    Objective:  /75 (BP Location: Right arm, Patient Position: Lying)   Pulse 85   Temp 98.1 °F (36.7 °C) (Oral)   Resp 16   Ht 162.6 cm (64\")   Wt 61 kg (134 lb 8 oz)   LMP 06/10/2019   SpO2 100%   BMI 23.09 kg/m²       INTAKE/OUTPUT      Intake/Output Summary (Last 24 hours) at 6/15/2019 1242  Last data filed at 6/15/2019 0900  Gross per 24 hour   Intake 1410 ml   Output --   Net 1410 ml       General Appearance: No acute distress  Eyes: Anicteric  Neck: Trachea midline   Cardiovascular:  RRR  Lungs:  Bilateral respirations unlabored   Abdomen: Soft and tender to palpation in particular overlying the surgical site on the right abdomen  Extremities:  No cyanosis or edema   Skin: No jaundice  Neurologic: awake and conversant   Surgical Site: No signs of infection      Imaging Results (last 24 hours)     ** No results found for the last 24 hours. **          Labs:  Lab Results (last 24 hours)     Procedure Component Value Units Date/Time    Basic Metabolic Panel [[id number]]  (Abnormal) Collected:  06/15/19 0554    Specimen:  Blood Updated:  06/15/19 0651     Glucose 99 mg/dL      BUN 15 mg/dL      Creatinine 0.50 mg/dL      Sodium 140 mmol/L      Potassium 4.1 mmol/L      Chloride 105 mmol/L      CO2 28.0 mmol/L      Calcium 8.6 mg/dL      eGFR Non African Amer 135 mL/min/1.73      BUN/Creatinine Ratio 30.0     Anion Gap 7.0 mmol/L     Narrative:       GFR Normal >60  Chronic Kidney Disease <60  Kidney Failure <15    CBC (No Diff) [[id number]]  (Abnormal) Collected:  06/15/19 0554    Specimen:  Blood Updated:  06/15/19 0626     WBC 8.89 10*3/mm3      RBC 2.91 10*6/mm3      Hemoglobin 8.7 g/dL      " Hematocrit 27.9 %      MCV 95.9 fL      MCH 29.9 pg      MCHC 31.2 g/dL      RDW 15.0 %      RDW-SD 52.8 fl      MPV 8.9 fL      Platelets 431 10*3/mm3     Tissue Pathology Exam [[id number]] Collected:  06/13/19 1459    Specimen:  Tissue from Retroperitoneum, Tissue from Retroperitoneum Updated:  06/14/19 1302     Case Report --     Surgical Pathology Report                         Case: [id number]                                  Authorizing Provider:  Tad Luque,   Collected:           06/13/2019 02:59 PM                                 MD                                                                           Ordering Location:     [facility]   Received:            06/13/2019 04:09 PM                                 OR                                                                           Pathologist:           Jeremy Calixto MD                                                        Specimens:   1) - Retroperitoneum, RETROPERITONEAL MASS                                                          2) - Retroperitoneum, EXTENDED DEEP MARGIN, STITCH FELIPE DEEP MARGIN                        Clinical Information --     The working history is retroperitoneal mass.        Final Diagnosis --     1. RETROPERITONEAL MASS, EXCISION:  Acute and subacute inflammation with organized fat necrosis and focal areas of necrosis with apparent abscess formation (see Comment).  Skeletal muscle with inflammatory changes   Negative for tumor and granuloma.  2. EXTENDED DEEP MARGIN:  Skeletal muscle and adipose tissue with subacute inflammatory changes.    PCC/mbc        Comment --     There are significant inflammatory changes seen affecting both adipose and skeletal muscle tissue.  Focally, there is fat necrosis.  Focal areas of necrosis with acute inflammation are present consistent with abscess.  No evidence of tumor or granuloma is identified.  Patient's prior history of angiosarcoma is noted.        Intraoperative Consultation --     Frozen section diagnosis: Inflammatory process (DGD)       Gross Description --     Specimen 1 received in formalin labeled as retroperitoneal mass is a 30.2 gram, 6.5 x 6.5 x 3.8 cm intact fibromuscular soft tissue mass. The outer surface is inked blue and sectioning reveals predominantly red/brown muscle with a 3.0 x 2.5 x 2.0 cm ill-defined area which is tan/pink and fibrofatty. No well circumscribed mass or area of necrosis is identified. Representative sections are submitted for frozen section, now resubmitted in cassette A. Additional representative sections to include the entirety of the fibrofatty area are submitted in cassettes 1B-H.     Specimen 2 received in formalin labeled as extended deep margin, stitch marks deep margin is a 3.0 x 2.5 x 2.0 cm portion of red/brown fibrofatty soft tissue. There is a suture designating the new deep margin, inked blue. Sectioning reveals a yellow/tan fibrofatty cut surface with inner mixed areas of red/brown muscle. The specimen is submitted entirely in cassettes 2A-E.  HBDOMO/tj          Microscopic Description --     The slides are reviewed and demonstrate histopathologic features supporting the above rendered diagnosis.                 Assessment:  Retroperitoneal mass now found to be a chronic abscess of unclear etiology.  Patient continues to show no suggestion of active infection.  Still with significant postoperative pain and early satiety has anticipated    Plan:   Continue to mobilize and provide adequate pain control.  A mild ileus would be anticipated given the extent of mobilization of the GI tract to reach this tumor.  Should improve with time.  Anticipate her being potentially stable for discharge in the next 48 hours.  Can reassess tomorrow and again on Monday if she still has not quite ready to convalescent home to by tomorrow.  Continue to monitor for signs of developing infection although again none are present at this  time    Tad Luque MD - 6/15/2019, 12:42 PM

## 2019-06-15 NOTE — PLAN OF CARE
Problem: Patient Care Overview  Goal: Plan of Care Review  Outcome: Ongoing (interventions implemented as appropriate)   06/15/19 8057   Coping/Psychosocial   Plan of Care Reviewed With patient   Plan of Care Review   Progress improving   OTHER   Outcome Summary Pt ambulated in her room and was up to the bathroom several times through the shift. Pt still complaining of pain frequently and receiving prn pain meds regularly. No acute overnight events. Cont POC.       Problem: Fall Risk (Adult)  Goal: Absence of Fall  Outcome: Ongoing (interventions implemented as appropriate)      Problem: Pain, Chronic (Adult)  Goal: Acceptable Pain/Comfort Level and Functional Ability  Outcome: Ongoing (interventions implemented as appropriate)

## 2019-06-15 NOTE — PLAN OF CARE
Problem: Patient Care Overview  Goal: Plan of Care Review  Outcome: Ongoing (interventions implemented as appropriate)   06/15/19 1705   Coping/Psychosocial   Plan of Care Reviewed With patient   Plan of Care Review   Progress no change   OTHER   Outcome Summary Patient is still requiring pain medication very frequently. Her main complaint is pain to the right lower quadrant of her abdomen, near surgical incision. No bowel movement as of yet, new medication added to regimen. Encouraging ambulation more frequently. Ambulated in grover and up to chair. Vitals have been stable. Continue with plan of care     Goal: Interprofessional Rounds/Family Conf  Outcome: Ongoing (interventions implemented as appropriate)      Problem: Fall Risk (Adult)  Goal: Identify Related Risk Factors and Signs and Symptoms  Outcome: Ongoing (interventions implemented as appropriate)      Problem: Surgery Nonspecified (Adult)  Goal: Signs and Symptoms of Listed Potential Problems Will be Absent, Minimized or Managed (Surgery Nonspecified)  Outcome: Ongoing (interventions implemented as appropriate)      Problem: Pain, Chronic (Adult)  Goal: Identify Related Risk Factors and Signs and Symptoms  Outcome: Ongoing (interventions implemented as appropriate)

## 2019-06-16 LAB
ANION GAP SERPL CALCULATED.3IONS-SCNC: 13 MMOL/L
BUN BLD-MCNC: 14 MG/DL (ref 6–20)
BUN/CREAT SERPL: 31.8 (ref 7–25)
CALCIUM SPEC-SCNC: 8.3 MG/DL (ref 8.6–10.5)
CHLORIDE SERPL-SCNC: 101 MMOL/L (ref 98–107)
CO2 SERPL-SCNC: 23 MMOL/L (ref 22–29)
CREAT BLD-MCNC: 0.44 MG/DL (ref 0.57–1)
DEPRECATED RDW RBC AUTO: 54 FL (ref 37–54)
ERYTHROCYTE [DISTWIDTH] IN BLOOD BY AUTOMATED COUNT: 15.3 % (ref 12.3–15.4)
GFR SERPL CREATININE-BSD FRML MDRD: >150 ML/MIN/1.73
GLUCOSE BLD-MCNC: 108 MG/DL (ref 65–99)
HCT VFR BLD AUTO: 29.6 % (ref 34–46.6)
HGB BLD-MCNC: 9.1 G/DL (ref 12–15.9)
IRON 24H UR-MRATE: 13 MCG/DL (ref 37–145)
IRON SATN MFR SERPL: 7 % (ref 20–50)
MCH RBC QN AUTO: 29.5 PG (ref 26.6–33)
MCHC RBC AUTO-ENTMCNC: 30.7 G/DL (ref 31.5–35.7)
MCV RBC AUTO: 96.1 FL (ref 79–97)
PLATELET # BLD AUTO: 459 10*3/MM3 (ref 140–450)
PMV BLD AUTO: 9 FL (ref 6–12)
POTASSIUM BLD-SCNC: 3.9 MMOL/L (ref 3.5–5.2)
RBC # BLD AUTO: 3.08 10*6/MM3 (ref 3.77–5.28)
SODIUM BLD-SCNC: 137 MMOL/L (ref 136–145)
TIBC SERPL-MCNC: 200 MCG/DL (ref 298–536)
TRANSFERRIN SERPL-MCNC: 134 MG/DL (ref 200–360)
WBC NRBC COR # BLD: 9.14 10*3/MM3 (ref 3.4–10.8)

## 2019-06-16 PROCEDURE — 85027 COMPLETE CBC AUTOMATED: CPT | Performed by: INTERNAL MEDICINE

## 2019-06-16 PROCEDURE — 25010000002 NA FERRIC GLUC CPLX PER 12.5 MG: Performed by: INTERNAL MEDICINE

## 2019-06-16 PROCEDURE — 83540 ASSAY OF IRON: CPT | Performed by: INTERNAL MEDICINE

## 2019-06-16 PROCEDURE — 80048 BASIC METABOLIC PNL TOTAL CA: CPT | Performed by: INTERNAL MEDICINE

## 2019-06-16 PROCEDURE — 84466 ASSAY OF TRANSFERRIN: CPT | Performed by: INTERNAL MEDICINE

## 2019-06-16 PROCEDURE — 25010000002 ENOXAPARIN PER 10 MG: Performed by: SURGERY

## 2019-06-16 PROCEDURE — 99232 SBSQ HOSP IP/OBS MODERATE 35: CPT | Performed by: NURSE PRACTITIONER

## 2019-06-16 RX ORDER — BISACODYL 5 MG/1
10 TABLET, DELAYED RELEASE ORAL DAILY
Status: DISCONTINUED | OUTPATIENT
Start: 2019-06-16 | End: 2019-06-18 | Stop reason: HOSPADM

## 2019-06-16 RX ORDER — DOCUSATE SODIUM 100 MG/1
100 CAPSULE, LIQUID FILLED ORAL 2 TIMES DAILY
Status: DISCONTINUED | OUTPATIENT
Start: 2019-06-16 | End: 2019-06-18 | Stop reason: HOSPADM

## 2019-06-16 RX ADMIN — OXYCODONE HYDROCHLORIDE AND ACETAMINOPHEN 1 TABLET: 10; 325 TABLET ORAL at 12:34

## 2019-06-16 RX ADMIN — SODIUM CHLORIDE 125 MG: 9 INJECTION, SOLUTION INTRAVENOUS at 17:47

## 2019-06-16 RX ADMIN — OXYCODONE HYDROCHLORIDE AND ACETAMINOPHEN 1 TABLET: 10; 325 TABLET ORAL at 16:43

## 2019-06-16 RX ADMIN — SENNOSIDES,DOCUSATE SODIUM 2 TABLET: 8.6; 5 TABLET, FILM COATED ORAL at 20:21

## 2019-06-16 RX ADMIN — OXYCODONE HYDROCHLORIDE AND ACETAMINOPHEN 1 TABLET: 10; 325 TABLET ORAL at 07:18

## 2019-06-16 RX ADMIN — GABAPENTIN 600 MG: 300 CAPSULE ORAL at 20:21

## 2019-06-16 RX ADMIN — SODIUM CHLORIDE 50 ML/HR: 9 INJECTION, SOLUTION INTRAVENOUS at 12:35

## 2019-06-16 RX ADMIN — DOCUSATE SODIUM 100 MG: 100 CAPSULE, LIQUID FILLED ORAL at 20:21

## 2019-06-16 RX ADMIN — ENOXAPARIN SODIUM 40 MG: 40 INJECTION SUBCUTANEOUS at 08:05

## 2019-06-16 RX ADMIN — GABAPENTIN 600 MG: 300 CAPSULE ORAL at 14:28

## 2019-06-16 RX ADMIN — DIAZEPAM 4 MG: 2 TABLET ORAL at 08:05

## 2019-06-16 RX ADMIN — GABAPENTIN 600 MG: 300 CAPSULE ORAL at 05:45

## 2019-06-16 RX ADMIN — DIAZEPAM 4 MG: 2 TABLET ORAL at 20:21

## 2019-06-16 RX ADMIN — BISACODYL 10 MG: 5 TABLET, COATED ORAL at 08:12

## 2019-06-16 RX ADMIN — CELECOXIB 200 MG: 200 CAPSULE ORAL at 20:21

## 2019-06-16 RX ADMIN — OXYCODONE HYDROCHLORIDE AND ACETAMINOPHEN 1 TABLET: 10; 325 TABLET ORAL at 20:21

## 2019-06-16 RX ADMIN — OXYCODONE HYDROCHLORIDE AND ACETAMINOPHEN 1 TABLET: 10; 325 TABLET ORAL at 02:45

## 2019-06-16 RX ADMIN — DULOXETINE HYDROCHLORIDE 60 MG: 60 CAPSULE, DELAYED RELEASE ORAL at 08:05

## 2019-06-16 RX ADMIN — DOCUSATE SODIUM 100 MG: 100 CAPSULE, LIQUID FILLED ORAL at 08:12

## 2019-06-16 RX ADMIN — CELECOXIB 200 MG: 200 CAPSULE ORAL at 08:05

## 2019-06-16 RX ADMIN — PANTOPRAZOLE SODIUM 40 MG: 40 TABLET, DELAYED RELEASE ORAL at 05:45

## 2019-06-16 RX ADMIN — SENNOSIDES,DOCUSATE SODIUM 2 TABLET: 8.6; 5 TABLET, FILM COATED ORAL at 08:05

## 2019-06-16 RX ADMIN — ACETAMINOPHEN 650 MG: 325 TABLET ORAL at 05:45

## 2019-06-16 NOTE — PLAN OF CARE
Problem: Patient Care Overview  Goal: Plan of Care Review  Outcome: Ongoing (interventions implemented as appropriate)    Goal: Individualization and Mutuality  Outcome: Ongoing (interventions implemented as appropriate)    Goal: Interprofessional Rounds/Family Conf  Outcome: Ongoing (interventions implemented as appropriate)      Problem: Fall Risk (Adult)  Goal: Identify Related Risk Factors and Signs and Symptoms  Outcome: Ongoing (interventions implemented as appropriate)    Goal: Absence of Fall  Outcome: Ongoing (interventions implemented as appropriate)      Problem: Pain, Chronic (Adult)  Goal: Identify Related Risk Factors and Signs and Symptoms  Outcome: Ongoing (interventions implemented as appropriate)    Goal: Acceptable Pain/Comfort Level and Functional Ability  Outcome: Ongoing (interventions implemented as appropriate)

## 2019-06-16 NOTE — PROGRESS NOTES
"Patient Name:  Marianne Rodriguez  YOB: 1975  [id number]    Surgery Progress Note    Date of visit: 6/16/2019    Subjective   Subjective: Still sore, but doing better overall from a pain perspective.          Objective     Objective:     /88 (BP Location: Right arm, Patient Position: Sitting)   Pulse 103   Temp 98.8 °F (37.1 °C) (Oral)   Resp 18   Ht 162.6 cm (64\")   Wt 61 kg (134 lb 8 oz)   LMP 06/10/2019   SpO2 100%   BMI 23.09 kg/m²     Intake/Output Summary (Last 24 hours) at 6/16/2019 0804  Last data filed at 6/15/2019 1832  Gross per 24 hour   Intake 1800 ml   Output --   Net 1800 ml       CV:  Rhythm  regular and rate regular   L:  Clear  to auscultation bilaterally   Abd:  Bowel sounds positive , soft, appropriately tender. Incision c/d/i  Ext:  No cyanosis, clubbing, edema    Recent labs that are back at this time have been reviewed.        Assessment/Plan     Assessment/ Plan:    Hospital Problem List     * (Principal) Retroperitoneal mass - Await return of bowel function, continue to improve ambulation. Slow advancement of PO as tolerated.    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

## 2019-06-16 NOTE — PLAN OF CARE
Problem: Patient Care Overview  Goal: Plan of Care Review  Outcome: Ongoing (interventions implemented as appropriate)   06/16/19 0153   Coping/Psychosocial   Plan of Care Reviewed With patient   Plan of Care Review   Progress improving   OTHER   Outcome Summary Pt ambulated in grover tonight with spouse/significant other. Pain seems to be well-controlled with just PO PRN pain meds, see MAR. Still has yet to have a bowel movement, Scheduled Miralax started tonight. Vitals stable. Will continue to monitor.       Problem: Fall Risk (Adult)  Goal: Identify Related Risk Factors and Signs and Symptoms  Outcome: Ongoing (interventions implemented as appropriate)   06/16/19 0153   Fall Risk (Adult)   Related Risk Factors (Fall Risk) fatigue/slow reaction;gait/mobility problems;environment unfamiliar   Signs and Symptoms (Fall Risk) presence of risk factors     Goal: Absence of Fall  Outcome: Ongoing (interventions implemented as appropriate)   06/16/19 0153   Fall Risk (Adult)   Absence of Fall making progress toward outcome       Problem: Surgery Nonspecified (Adult)  Goal: Signs and Symptoms of Listed Potential Problems Will be Absent, Minimized or Managed (Surgery Nonspecified)  Outcome: Ongoing (interventions implemented as appropriate)   06/16/19 0153   Goal/Outcome Evaluation   Problems Assessed (Surgery) all   Problems Present (Surgery) bowel motility decreased;pain     Goal: Anesthesia/Sedation Recovery  Outcome: Outcome(s) achieved Date Met: 06/16/19 06/16/19 0153   Goal/Outcome Evaluation   Anesthesia/Sedation Recovery recovered to baseline       Problem: Pain, Chronic (Adult)  Goal: Identify Related Risk Factors and Signs and Symptoms  Outcome: Ongoing (interventions implemented as appropriate)   06/16/19 0153   Pain, Chronic (Adult)   Related Risk Factors (Chronic Pain) procedures/treatments;surgery   Signs and Symptoms (Chronic Pain) verbalization of pain descriptors;guarding  behavior/splinting/limp;fatigue/weakness     Goal: Acceptable Pain/Comfort Level and Functional Ability  Outcome: Ongoing (interventions implemented as appropriate)   06/16/19 0153   Pain, Chronic (Adult)   Acceptable Pain/Comfort Level and Functional Ability making progress toward outcome

## 2019-06-16 NOTE — PROGRESS NOTES
[facility] Medicine Services  PROGRESS NOTE    Patient Name: Marianne Rodriguez  : 1975  MRN: [id number]    Date of Admission: 2019  Length of Stay: 3  Primary Care Physician: Doris Self APRN    Subjective   Subjective     CC:  Right retroperitoneal mass/abscess, s/p resection    HPI:    Pain seems better today.  + flatus but still no bm.  Tolerating liquids with no issue.      Review of Systems  Gen- No fevers, chills  CV- No chest pain, palpitations  Resp- No cough, dyspnea  GI- No N/V/D, abd pain      Otherwise ROS is negative except as mentioned in the HPI.    Objective   Objective     Vital Signs:   Temp:  [98.4 °F (36.9 °C)-99.3 °F (37.4 °C)] 98.4 °F (36.9 °C)  Heart Rate:  [] 83  Resp:  [16-18] 18  BP: (121-139)/(74-92) 139/92        Physical Exam:  Constitutional: No acute distress, awake, alert, resting in bed  HENT: NCAT, mucous membranes moist  Respiratory: Clear to auscultation bilaterally, respiratory effort normal on room air  Cardiovascular: RRR, no murmurs, rubs, or gallops, palpable pedal pulses bilaterally  Gastrointestinal: Positive bowel sounds, soft, nontender, nondistended  Musculoskeletal: No bilateral ankle edema  Psychiatric: Appropriate affect, cooperative  Neurologic: Oriented x 3, strength symmetric in all extremities, Cranial Nerves grossly intact to confrontation, speech clear  Skin: No rashes, abdominal incision intact    Results Reviewed:  I have personally reviewed current lab, radiology, and data and agree.    Results from last 7 days   Lab Units 19  0452 06/15/19  0554 19  0500   WBC 10*3/mm3 9.14 8.89 13.94*   HEMOGLOBIN g/dL 9.1* 8.7* 9.7*   HEMATOCRIT % 29.6* 27.9* 30.5*   PLATELETS 10*3/mm3 459* 431 474*     Results from last 7 days   Lab Units 19  0452 06/15/19  0554 19  0500   SODIUM mmol/L 137 140 137   POTASSIUM mmol/L 3.9 4.1 4.2   CHLORIDE mmol/L 101 105 100   CO2 mmol/L 23.0 28.0 25.0   BUN  mg/dL 14 15 8   CREATININE mg/dL 0.44* 0.50* 0.42*   GLUCOSE mg/dL 108* 99 145*   CALCIUM mg/dL 8.3* 8.6 8.5*     Estimated Creatinine Clearance: 160.4 mL/min (A) (by C-G formula based on SCr of 0.44 mg/dL (L)).    Microbiology Results Abnormal     None          Imaging Results (last 24 hours)     ** No results found for the last 24 hours. **               I have reviewed the medications:  Scheduled Meds:    bisacodyl 10 mg Oral Daily   celecoxib 200 mg Oral Q12H   diazePAM 4 mg Oral Q12H   docusate sodium 100 mg Oral BID   DULoxetine 60 mg Oral Daily   enoxaparin 40 mg Subcutaneous Daily   gabapentin 600 mg Oral Q8H   pantoprazole 40 mg Oral Q AM   polyethylene glycol 17 g Oral Daily   sennosides-docusate sodium 2 tablet Oral BID     Continuous Infusions:    sodium chloride 50 mL/hr Last Rate: 50 mL/hr (06/16/19 1235)     PRN Meds:.•  acetaminophen **OR** acetaminophen **OR** acetaminophen  •  albuterol  •  bisacodyl  •  HYDROmorphone **AND** naloxone  •  hydrOXYzine  •  magnesium hydroxide  •  magnesium sulfate **OR** magnesium sulfate in D5W 1g/100mL (PREMIX) **OR** magnesium sulfate  •  ondansetron **OR** ondansetron  •  oxyCODONE-acetaminophen  •  oxyCODONE-acetaminophen  •  oxyCODONE-acetaminophen  •  promethazine **OR** promethazine      Assessment/Plan   Assessment / Plan     Active Hospital Problems    Diagnosis POA   • **Retroperitoneal mass [R19.00] Yes   • Essential hypertension [I10] Yes   • GERD (gastroesophageal reflux disease) [K21.9] Yes   • Anxiety and depression [F41.9, F32.9] Yes   • Tobacco abuse [Z72.0] Yes   • 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 female with PMH significant for HTN, GERD, anxiety/depression, peripheral neuropathy and cervical dysplasia. 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 She underwent mastectomy,  chemotherapy and radiation. She was followed by Dr. Avila of [place] but was discharged from the practice after missing too many appointments. She established care with Dr. Charlotte Mann. An MRI had recently been obtained at  and she was found to have a cystic lesion in the lower pelvis. She was referred to Dr. Ott for further evaluation. CT chest/abdomen/pelvis 5/31/19 showed abnormal soft tissue posterior to right psoas along right pelvic sidewall superiorly concerning for either infectious process or metastatic process.    Patient was admitted to [facility] on 6/13/19 and underwent radical resection or right retroperitoneal tumor. Pathology was consistent w/ acute and subacute inflammation w/ organized fat necrosis and focal areas of necrosis w/ apparent abscess formation but negative for tumor or granuloma. hospitalists were consulted postoperatively for medical comanagement    *Retroperitoneal mass, consistent w/ chronic abscess of unclear etiology   -s/p radical resection right retroperitoneal mass (pathology c/w inflammation/chronic abscess)   -received 3 doses perioperative zosyn, now monitoring off antibiotics  *post operative ileus  *Hx angiosarcoma left breast    -s/p previous mastectomy, chemo, & xrt; follows w/ dr. Mann now  *hx HTN, currently borderline hypotension  *gerd  --------------------------------------------------------------------------    Plan:  -post op care & diet per Dr. Ott  -mobilize/pul toilet  -monitor off abx; monitor wbc, temp.    -holding lisinopril (on 20mg po daily at home), restart prn  -continue IVF    -am labs: CBC, BMP    Dispo: -depending on clinical course and Dr. Ott recs, possible d/c home Monday       DVT Prophylaxis:  Sq lovenox      CODE STATUS:   Code Status and Medical Interventions:   Ordered at: 06/13/19 1750     Level Of Support Discussed With:    Patient     Code Status:    CPR     Medical Interventions (Level of Support Prior to  Arrest):    Full         Electronically signed by ROBERT Lovelace, 06/16/19, 1:10 PM.

## 2019-06-17 ENCOUNTER — APPOINTMENT (OUTPATIENT)
Dept: CARDIOLOGY | Facility: HOSPITAL | Age: 44
End: 2019-06-17

## 2019-06-17 LAB
ANION GAP SERPL CALCULATED.3IONS-SCNC: 10 MMOL/L
BH CV UPPER VENOUS LEFT SUBCLAVIAN AUGMENT: NORMAL
BH CV UPPER VENOUS LEFT SUBCLAVIAN COMPETENT: NORMAL
BH CV UPPER VENOUS LEFT SUBCLAVIAN COMPRESS: NORMAL
BH CV UPPER VENOUS LEFT SUBCLAVIAN PHASIC: NORMAL
BH CV UPPER VENOUS LEFT SUBCLAVIAN SPONT: NORMAL
BH CV UPPER VENOUS RIGHT AXILLARY AUGMENT: NORMAL
BH CV UPPER VENOUS RIGHT AXILLARY COMPRESS: NORMAL
BH CV UPPER VENOUS RIGHT AXILLARY PHASIC: NORMAL
BH CV UPPER VENOUS RIGHT AXILLARY SPONT: NORMAL
BH CV UPPER VENOUS RIGHT BASILIC FOREARM COMPRESS: NORMAL
BH CV UPPER VENOUS RIGHT BASILIC UPPER COMPRESS: NORMAL
BH CV UPPER VENOUS RIGHT BRACHIAL AUGMENT: NORMAL
BH CV UPPER VENOUS RIGHT BRACHIAL COMPRESS: NORMAL
BH CV UPPER VENOUS RIGHT BRACHIAL PHASIC: NORMAL
BH CV UPPER VENOUS RIGHT BRACHIAL SPONT: NORMAL
BH CV UPPER VENOUS RIGHT CEPHALIC FOREARM COMPRESS: NORMAL
BH CV UPPER VENOUS RIGHT CEPHALIC UPPER COMPRESS: NORMAL
BH CV UPPER VENOUS RIGHT INNOMINATE AUGMENT: NORMAL
BH CV UPPER VENOUS RIGHT INNOMINATE COMPRESS: NORMAL
BH CV UPPER VENOUS RIGHT INNOMINATE PHASIC: NORMAL
BH CV UPPER VENOUS RIGHT INNOMINATE SPONT: NORMAL
BH CV UPPER VENOUS RIGHT INTERNAL JUGULAR AUGMENT: NORMAL
BH CV UPPER VENOUS RIGHT INTERNAL JUGULAR COMPRESS: NORMAL
BH CV UPPER VENOUS RIGHT INTERNAL JUGULAR PHASIC: NORMAL
BH CV UPPER VENOUS RIGHT INTERNAL JUGULAR SPONT: NORMAL
BH CV UPPER VENOUS RIGHT RADIAL AUGMENT: NORMAL
BH CV UPPER VENOUS RIGHT RADIAL COMPRESS: NORMAL
BH CV UPPER VENOUS RIGHT RADIAL PHASIC: NORMAL
BH CV UPPER VENOUS RIGHT RADIAL SPONT: NORMAL
BH CV UPPER VENOUS RIGHT SUBCLAVIAN AUGMENT: NORMAL
BH CV UPPER VENOUS RIGHT SUBCLAVIAN COMPRESS: NORMAL
BH CV UPPER VENOUS RIGHT SUBCLAVIAN PHASIC: NORMAL
BH CV UPPER VENOUS RIGHT SUBCLAVIAN SPONT: NORMAL
BH CV UPPER VENOUS RIGHT ULNAR AUGMENT: NORMAL
BH CV UPPER VENOUS RIGHT ULNAR COMPRESS: NORMAL
BH CV UPPER VENOUS RIGHT ULNAR PHASIC: NORMAL
BH CV UPPER VENOUS RIGHT ULNAR SPONT: NORMAL
BUN BLD-MCNC: 9 MG/DL (ref 6–20)
BUN/CREAT SERPL: 22.5 (ref 7–25)
CALCIUM SPEC-SCNC: 8.3 MG/DL (ref 8.6–10.5)
CHLORIDE SERPL-SCNC: 104 MMOL/L (ref 98–107)
CO2 SERPL-SCNC: 25 MMOL/L (ref 22–29)
CREAT BLD-MCNC: 0.4 MG/DL (ref 0.57–1)
DEPRECATED RDW RBC AUTO: 51.4 FL (ref 37–54)
ERYTHROCYTE [DISTWIDTH] IN BLOOD BY AUTOMATED COUNT: 15 % (ref 12.3–15.4)
GFR SERPL CREATININE-BSD FRML MDRD: >150 ML/MIN/1.73
GLUCOSE BLD-MCNC: 81 MG/DL (ref 65–99)
HCT VFR BLD AUTO: 27.7 % (ref 34–46.6)
HGB BLD-MCNC: 8.8 G/DL (ref 12–15.9)
MCH RBC QN AUTO: 29.6 PG (ref 26.6–33)
MCHC RBC AUTO-ENTMCNC: 31.8 G/DL (ref 31.5–35.7)
MCV RBC AUTO: 93.3 FL (ref 79–97)
PLATELET # BLD AUTO: 453 10*3/MM3 (ref 140–450)
PMV BLD AUTO: 9 FL (ref 6–12)
POTASSIUM BLD-SCNC: 3.7 MMOL/L (ref 3.5–5.2)
RBC # BLD AUTO: 2.97 10*6/MM3 (ref 3.77–5.28)
SODIUM BLD-SCNC: 139 MMOL/L (ref 136–145)
WBC NRBC COR # BLD: 7.25 10*3/MM3 (ref 3.4–10.8)

## 2019-06-17 PROCEDURE — 80048 BASIC METABOLIC PNL TOTAL CA: CPT | Performed by: SURGERY

## 2019-06-17 PROCEDURE — 93971 EXTREMITY STUDY: CPT

## 2019-06-17 PROCEDURE — 93971 EXTREMITY STUDY: CPT | Performed by: INTERNAL MEDICINE

## 2019-06-17 PROCEDURE — 99232 SBSQ HOSP IP/OBS MODERATE 35: CPT | Performed by: PHYSICIAN ASSISTANT

## 2019-06-17 PROCEDURE — 97116 GAIT TRAINING THERAPY: CPT

## 2019-06-17 PROCEDURE — 97110 THERAPEUTIC EXERCISES: CPT

## 2019-06-17 PROCEDURE — 25010000002 ENOXAPARIN PER 10 MG: Performed by: SURGERY

## 2019-06-17 PROCEDURE — 94799 UNLISTED PULMONARY SVC/PX: CPT

## 2019-06-17 PROCEDURE — 85027 COMPLETE CBC AUTOMATED: CPT | Performed by: SURGERY

## 2019-06-17 RX ORDER — DIAZEPAM 2 MG/1
2 TABLET ORAL EVERY 12 HOURS
Status: DISCONTINUED | OUTPATIENT
Start: 2019-06-17 | End: 2019-06-18 | Stop reason: HOSPADM

## 2019-06-17 RX ORDER — LISINOPRIL 5 MG/1
5 TABLET ORAL
Status: DISCONTINUED | OUTPATIENT
Start: 2019-06-17 | End: 2019-06-18 | Stop reason: HOSPADM

## 2019-06-17 RX ADMIN — DOCUSATE SODIUM 100 MG: 100 CAPSULE, LIQUID FILLED ORAL at 20:12

## 2019-06-17 RX ADMIN — PANTOPRAZOLE SODIUM 40 MG: 40 TABLET, DELAYED RELEASE ORAL at 06:05

## 2019-06-17 RX ADMIN — LISINOPRIL 5 MG: 5 TABLET ORAL at 15:51

## 2019-06-17 RX ADMIN — POLYETHYLENE GLYCOL 3350 17 G: 17 POWDER, FOR SOLUTION ORAL at 08:11

## 2019-06-17 RX ADMIN — DULOXETINE HYDROCHLORIDE 60 MG: 60 CAPSULE, DELAYED RELEASE ORAL at 08:10

## 2019-06-17 RX ADMIN — BISACODYL 10 MG: 5 TABLET, COATED ORAL at 08:10

## 2019-06-17 RX ADMIN — OXYCODONE HYDROCHLORIDE AND ACETAMINOPHEN 1 TABLET: 10; 325 TABLET ORAL at 18:33

## 2019-06-17 RX ADMIN — CELECOXIB 200 MG: 200 CAPSULE ORAL at 08:11

## 2019-06-17 RX ADMIN — DOCUSATE SODIUM 100 MG: 100 CAPSULE, LIQUID FILLED ORAL at 08:10

## 2019-06-17 RX ADMIN — SENNOSIDES,DOCUSATE SODIUM 2 TABLET: 8.6; 5 TABLET, FILM COATED ORAL at 08:10

## 2019-06-17 RX ADMIN — GABAPENTIN 600 MG: 300 CAPSULE ORAL at 06:05

## 2019-06-17 RX ADMIN — DIAZEPAM 4 MG: 2 TABLET ORAL at 08:10

## 2019-06-17 RX ADMIN — OXYCODONE HYDROCHLORIDE AND ACETAMINOPHEN 1 TABLET: 5; 325 TABLET ORAL at 06:05

## 2019-06-17 RX ADMIN — CELECOXIB 200 MG: 200 CAPSULE ORAL at 20:12

## 2019-06-17 RX ADMIN — DIAZEPAM 2 MG: 2 TABLET ORAL at 20:12

## 2019-06-17 RX ADMIN — OXYCODONE HYDROCHLORIDE AND ACETAMINOPHEN 1 TABLET: 5; 325 TABLET ORAL at 09:55

## 2019-06-17 RX ADMIN — GABAPENTIN 600 MG: 300 CAPSULE ORAL at 20:12

## 2019-06-17 RX ADMIN — ENOXAPARIN SODIUM 40 MG: 40 INJECTION SUBCUTANEOUS at 08:11

## 2019-06-17 RX ADMIN — GABAPENTIN 600 MG: 300 CAPSULE ORAL at 13:54

## 2019-06-17 RX ADMIN — SENNOSIDES,DOCUSATE SODIUM 2 TABLET: 8.6; 5 TABLET, FILM COATED ORAL at 20:12

## 2019-06-17 NOTE — PROGRESS NOTES
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   PSxH: She  has a past surgical history that includes Laparoscopic cholecystectomy (2012); Tubal ligation (2002); LEEP (05/2017); Mastectomy (Left, 09/2017); LASIK; and Exploratory Laparotomy (N/A, 6/13/2019).     Other nutrition related factors/ medical procedures/ tests since admission:  (6/13) s/p radical resection of retroperitoneal tumor    Reported/Observed/Food/Nutrition Related History:     Pt reported appetite to be improving some however not back to normal, tolerating advanced diet well, denied N/V symptoms, does have some diarrhea. Pt reported drinking an occasional boost, about 1 daily.     Anthropometrics     Height: 162.6 cm (64\")  Last filed wt: 117 lb @ MD office visit (standing scale) on 6/11    BMI: 20.14 kg/m2   Normal: 18.5-24.9kg/m2    Ideal Body Weight (IBW) (kg): 55    Per RD note (6/14)  Weight Change   UBW: 126 lb     Weight change: 9 lb    % wt change: 7.1%    Time frame of weight loss: ~ 8-9 months per pt     Current Nutrition Prescription     PO: Diet Regular; boost plus TID- chocolate    Intake: Per nursing documentation pt consuming 50% of 5 meals (6/14-6/16); reported occasional boost intake    Nutrition Diagnosis     6/14; updated 6/17  Problem " Inadequate oral intake   Etiology Peritoneal ca    Signs/Symptoms PO intake 50% of 5 meals ; consuming 1 boost per day   Status: ongoing    Nutrition Intervention   1.  Follow treatment progress, Care plan reviewed  2. Encourage intake       Goal:   General: Nutrition support treatment  PO: Increase intake   Additional goals: no further significant weight loss, pt is able to consume > or equal to 67% of meal trays.     Monitoring/Evaluation:   Per protocol, PO intake, Supplement intake, Pertinent labs, Weight, GI status, Symptoms      Will Continue to follow per protocol      Annabelle Clintons  Time Spent: 25 minutes

## 2019-06-17 NOTE — PROGRESS NOTES
Continued Stay Note  [facility]     Patient Name: Marianne Rodriguez  MRN: [id number]  Today's Date: 6/17/2019    Admit Date: 6/13/2019    Discharge Plan     Row Name 06/17/19 1037       Plan    Plan  Home at discharge     Patient/Family in Agreement with Plan  yes    Plan Comments  Spoke with patient at bedside - asked about home health and patient does not feel it is necessary, states she is ambulating around the room well enough and will have assistance at home. She does not want outpatient PT either. She did agree to having a rolling walker to assist as she recovers and for mobility help at home. Order has been placed and faxed to [facility] per patient request. Goal remains to return home at discharge - juvenal [phone]         Discharge Codes    No documentation.             Juvenal Beck RN

## 2019-06-17 NOTE — PROGRESS NOTES
"Patient Name:  Marianne Rodriguez  YOB: 1975  [id number]    Surgery Progress Note    Date of visit: 6/17/2019    Subjective   Subjective: Feeling better, pain better controlled, having BM's, eating.         Objective     Objective:     /90 (BP Location: Right arm, Patient Position: Lying)   Pulse 84   Temp 98.1 °F (36.7 °C) (Oral)   Resp 18   Ht 162.6 cm (64\")   Wt 61 kg (134 lb 8 oz)   LMP 06/10/2019   SpO2 100%   BMI 23.09 kg/m²     Intake/Output Summary (Last 24 hours) at 6/17/2019 0643  Last data filed at 6/16/2019 1700  Gross per 24 hour   Intake 360 ml   Output --   Net 360 ml       CV:  Rhythm  regular and rate regular   L:  Clear  to auscultation bilaterally   Abd:  Bowel sounds positive , soft,  on right as expected, incision c/d/i  Ext:  No cyanosis, clubbing, edema    Recent labs that are back at this time have been reviewed.        Assessment/Plan     Assessment/ Plan:    Hospital Problem List     * (Principal) Retroperitoneal mass - Doing well after resection. OK to go home from my standpoint if pain controlled and she has outpatient PT for ambulation.    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

## 2019-06-17 NOTE — PLAN OF CARE
Problem: Patient Care Overview  Goal: Plan of Care Review  Outcome: Ongoing (interventions implemented as appropriate)   06/17/19 0842   Coping/Psychosocial   Plan of Care Reviewed With patient   Plan of Care Review   Progress improving   OTHER   Outcome Summary patient ambulated 175 feet with very slow pace, no AD needed, reports of abdominal pain and under left arm due to hysterectomy, weakness at end of walk.

## 2019-06-17 NOTE — THERAPY TREATMENT NOTE
Acute Care - Physical Therapy Treatment Note  [facility]     Patient Name: Marianne Rodriguez  : 1975  MRN: [id number]  Today's Date: 2019  Onset of Illness/Injury or Date of Surgery: 19  Date of Referral to PT: 19  Referring Physician: Tad Rodriguez MD    Admit Date: 2019    Visit Dx:    ICD-10-CM ICD-9-CM   1. 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)   • Current every day smoker   • Retroperitoneal mass   • Essential hypertension   • GERD (gastroesophageal reflux disease)   • Anxiety and depression   • Tobacco abuse       Therapy Treatment    Rehabilitation Treatment Summary     Row Name 1914             Treatment Time/Intention    Discipline  physical therapy assistant  -AS      Document Type  therapy note (daily note)  -AS      Subjective Information  complains of;pain  -AS      Mode of Treatment  physical therapy  -AS      Patient/Family Observations  no family at bedside, patient sitting [facility] and agreed to therapy  -AS      Patient Effort  excellent  -AS      Existing Precautions/Restrictions  fall;other (see comments) adbominal pain and L arm pain h/o masectomy  -AS      Recorded by [AS] Annabelle Marques PTA 19 0841      Row Name 1914             Cognitive Assessment/Intervention- PT/OT    Affect/Mental Status (Cognitive)  WNL  -AS      Orientation Status (Cognition)  oriented x 4  -AS      Follows Commands (Cognition)  WNL  -AS      Cognitive Function (Cognitive)  WNL  -AS      Personal Safety Interventions  fall prevention program maintained;gait belt;nonskid shoes/slippers when out of bed  -AS      Recorded by [AS] Annabelle Marques PTA 19 0841      Row Name 1914             Bed Mobility Assessment/Treatment    Comment (Bed Mobility)  not tested, patient sitting [facility] pre/post tx  -AS      Recorded by [AS] Annabelle Marques PTA  06/17/19 0841      Row Name 06/17/19 0814             Sit-Stand Transfer    Sit-Stand [place] (Transfers)  stand by assist  -AS      Assistive Device (Sit-Stand Transfers)  other (see comments) none  -AS      Recorded by [AS] Annabelle Marques, PTA 06/17/19 0841      Row Name 06/17/19 0814             Stand-Sit Transfer    Stand-Sit [place] (Transfers)  stand by assist  -AS      Assistive Device (Stand-Sit Transfers)  other (see comments) none  -AS      Recorded by [AS] Annabelle Marques, PTA 06/17/19 0841      Row Name 06/17/19 0814             Gait/Stairs Assessment/Training    11810 - Gait Training Minutes   15  -AS      Gait/Stairs Assessment/Training  gait/ambulation assistive device  -AS      [place] Level (Gait)  contact guard  -AS      Assistive Device (Gait)  other (see comments) none  -AS      Distance in Feet (Gait)  175  -AS      Pattern (Gait)  step-through  -AS      Deviations/Abnormal Patterns (Gait)  bilateral deviations;josep decreased;gait speed decreased;stride length decreased  -AS      Comment (Gait/Stairs)  patient ambulated 175 feet with very slow pace, reports of abdominal pain and under left arm due to hysterectomy, weakness at end of walk.  -AS      Recorded by [AS] Annabelle Marques, PTA 06/17/19 0841      Row Name 06/17/19 0814             Motor Skills Assessment/Interventions    Additional Documentation  Therapeutic Exercise (Group)  -AS      Recorded by [AS] Annabelle Marques, PTA 06/17/19 0841      Row Name 06/17/19 0814             Therapeutic Exercise    32138 - PT Therapeutic Exercise Minutes  8  -AS      Recorded by [AS] Annabelle Marques, PTA 06/17/19 0841      Row Name 06/17/19 0814             Therapeutic Exercise    Upper Extremity Range of Motion (Therapeutic Exercise)  shoulder flexion/extension, bilateral;shoulder abduction/adduction, bilateral;elbow flexion/extension, bilateral  -AS      Lower Extremity Range of Motion (Therapeutic Exercise)  hip  flexion/extension, bilateral;knee flexion/extension, bilateral;ankle dorsiflexion/plantar flexion, bilateral  -AS      Exercise Type (Therapeutic Exercise)  AROM (active range of motion)  -AS      Position (Therapeutic Exercise)  seated  -AS      Sets/Reps (Therapeutic Exercise)  1/10  -AS      Recorded by [AS] Annabelle Marques, STACEY 06/17/19 0841      Row Name 06/17/19 0814             Positioning and Restraints    Pre-Treatment Position  sitting in chair/recliner  -AS      Post Treatment Position  chair  -AS      In Chair  reclined;call light within reach;encouraged to call for assist  -AS      Recorded by [AS] Annabelle Marques, PTA 06/17/19 0841      Row Name 06/17/19 0814             Pain Scale: Numbers Pre/Post-Treatment    Pain Scale: Numbers, Pretreatment  6/10  -AS      Pain Scale: Numbers, Post-Treatment  6/10  -AS      Pain Location - Side  Bilateral  -AS      Pain Location - Orientation  lower  -AS      Pain Location  abdomen  -AS      Pain Intervention(s)  Repositioned;Ambulation/increased activity  -AS      Recorded by [AS] Annabelle Marques, PTA 06/17/19 0841      Row Name                Wound 06/13/19 1516 Other (See comments) abdomen incision    Wound - Properties Group Date first assessed: 06/13/19 [DH] Time first assessed: 1516 [DH] Side: Other (See comments) [DH] Location: abdomen [DH] Type: incision [DH] Recorded by:  [DH] Gin Carrillo RN 06/13/19 1516      User Key  (r) = Recorded By, (t) = Taken By, (c) = Cosigned By    Initials Name Effective Dates Discipline    AS Annabelle Marques, PTA 06/22/15 -  PT    DH Gin Carrillo RN 06/16/16 -  Nurse          Wound 06/13/19 1516 Other (See comments) abdomen incision (Active)   Dressing Appearance open to air 6/17/2019  6:00 AM   Closure Staples 6/17/2019  6:00 AM   Base blanchable;clean;dry 6/17/2019  6:00 AM   Periwound intact;dry 6/17/2019  6:00 AM   Periwound Temperature warm 6/17/2019  6:00 AM   Edges rolled/closed 6/17/2019   6:00 AM   Drainage Amount none 6/17/2019  6:00 AM           Physical Therapy Education     Title: PT OT SLP Therapies (In Progress)     Topic: Physical Therapy (In Progress)     Point: Mobility training (In Progress)     Learning Progress Summary           Patient Acceptance, E, NR by AS at 6/17/2019  8:42 AM    Acceptance, E,TB, VU by PAUL at 6/15/2019  5:07 PM    Acceptance, E, VU,NR by GABRIELLA at 6/14/2019  9:33 AM                   Point: Home exercise program (In Progress)     Learning Progress Summary           Patient Acceptance, E, NR by AS at 6/17/2019  8:42 AM    Acceptance, E,TB, VU by PAUL at 6/15/2019  5:07 PM                   Point: Body mechanics (In Progress)     Learning Progress Summary           Patient Acceptance, E, NR by AS at 6/17/2019  8:42 AM    Acceptance, E,TB, VU by PAUL at 6/15/2019  5:07 PM    Acceptance, E, VU,NR by GABRIELLA at 6/14/2019  9:33 AM                   Point: Precautions (In Progress)     Learning Progress Summary           Patient Acceptance, E, NR by AS at 6/17/2019  8:42 AM    Acceptance, E,TB, VU by PAUL at 6/15/2019  5:07 PM    Acceptance, E, VU,NR by GABRIELLA at 6/14/2019  9:33 AM                               User Key     Initials Effective Dates Name Provider Type Discipline    AS 06/22/15 -  Annabelle Marques, PTA Physical Therapy Assistant PT     06/23/17 -  Goldie Malik RN Registered Nurse Nurse     08/30/18 -  Kaya Wheeler, PT Physical Therapist PT                PT Recommendation and Plan     Plan of Care Reviewed With: patient  Progress: improving  Outcome Summary: patient ambulated 175 feet with very slow pace, no AD needed, reports of abdominal pain and under left arm due to hysterectomy, weakness at end of walk.  Outcome Measures     Row Name 06/17/19 0814 06/14/19 0933          How much help from another person do you currently need...    Turning from your back to your side while in flat bed without using bedrails?  3  -AS  4  -GABRIELLA     Moving from lying on back to  sitting on the side of a flat bed without bedrails?  3  -AS  3  -GABRIELLA     Moving to and from a bed to a chair (including a wheelchair)?  3  -AS  3  -GABRIELLA     Standing up from a chair using your arms (e.g., wheelchair, bedside chair)?  4  -AS  3  -GABRIELLA     Climbing 3-5 steps with a railing?  3  -AS  2  -GABRIELLA     To walk in hospital room?  3  -AS  3  -GABRIELLA     AM-PAC 6 Clicks Score  19  -AS  18  -GABRIELLA        Functional Assessment    Outcome Measure Options  AM-PAC 6 Clicks Basic Mobility (PT)  -AS  AM-PAC 6 Clicks Basic Mobility (PT)  -GABRIELLA       User Key  (r) = Recorded By, (t) = Taken By, (c) = Cosigned By    Initials Name Provider Type    AS Annabelle Marques PTA Physical Therapy Assistant    Kaya Sorenson, PT Physical Therapist         Time Calculation:   PT Charges     Row Name 06/17/19 0814             Time Calculation    Start Time  0814  -AS      PT Received On  06/17/19  -AS      PT Goal Re-Cert Due Date  06/24/19  -AS         Timed Charges    12625 - PT Therapeutic Exercise Minutes  8  -AS      83391 - Gait Training Minutes   15  -AS        User Key  (r) = Recorded By, (t) = Taken By, (c) = Cosigned By    Initials Name Provider Type    AS Annabelle Marques PTA Physical Therapy Assistant        Therapy Charges for Today     Code Description Service Date Service Provider Modifiers Qty    76537776282 HC PT THER PROC EA 15 MIN 6/17/2019 Annabelle Marques PTA GP 1    34607900223 HC GAIT TRAINING EA 15 MIN 6/17/2019 Annabelle Marques PTA GP 1          PT G-Codes  Outcome Measure Options: AM-PAC 6 Clicks Basic Mobility (PT)  AM-PAC 6 Clicks Score: 19    Annabelle Marques PTA  6/17/2019

## 2019-06-17 NOTE — PLAN OF CARE
Problem: Patient Care Overview  Goal: Plan of Care Review   06/17/19 0134   Coping/Psychosocial   Plan of Care Reviewed With patient   Plan of Care Review   Progress improving   OTHER   Outcome Summary Pt ambulating multiple times throughout the day. Did finally have 4 large BMs today. Pain very well controlled with just PO pain meds. Will continue to monitor.       Problem: Fall Risk (Adult)  Goal: Identify Related Risk Factors and Signs and Symptoms  Outcome: Ongoing (interventions implemented as appropriate)   06/17/19 0134   Fall Risk (Adult)   Related Risk Factors (Fall Risk) fatigue/slow reaction;gait/mobility problems;environment unfamiliar   Signs and Symptoms (Fall Risk) presence of risk factors     Goal: Absence of Fall  Outcome: Outcome(s) achieved Date Met: 06/17/19 06/17/19 0134   Fall Risk (Adult)   Absence of Fall making progress toward outcome       Problem: Surgery Nonspecified (Adult)  Goal: Signs and Symptoms of Listed Potential Problems Will be Absent, Minimized or Managed (Surgery Nonspecified)  Outcome: Ongoing (interventions implemented as appropriate)   06/17/19 0134   Goal/Outcome Evaluation   Problems Assessed (Surgery) all   Problems Present (Surgery) pain      06/17/19 0134   Goal/Outcome Evaluation   Problems Assessed (Surgery) all   Problems Present (Surgery) pain       Problem: Pain, Chronic (Adult)  Goal: Identify Related Risk Factors and Signs and Symptoms  Outcome: Ongoing (interventions implemented as appropriate)   06/17/19 0134   Pain, Chronic (Adult)   Related Risk Factors (Chronic Pain) surgery;procedures/treatments   Signs and Symptoms (Chronic Pain) fatigue/weakness;guarding behavior/splinting/limp;verbalization of pain descriptors     Goal: Acceptable Pain/Comfort Level and Functional Ability  Outcome: Ongoing (interventions implemented as appropriate)   06/17/19 0134   Pain, Chronic (Adult)   Acceptable Pain/Comfort Level and Functional Ability making progress toward  outcome

## 2019-06-17 NOTE — DISCHARGE PLACEMENT REQUEST
"Please see order for Rolling Walker     Room N627     Thank you     Massiel [phone]       Marianne Rodriguez (44 y.o. Female)     Date of Birth Social Security Number Address Home Phone MRN    1975  [address] [id number] [phone] [id number]    Jew Marital Status          Advent        Admission Date Admission Type Admitting Provider Attending Provider Department, Room/Bed    19 Elective Tad Luque MD McKenzie, Shaun Patrick, MD [facility] 6B, N627/1    Discharge Date Discharge Disposition Discharge Destination                       Attending Provider:  Tad Luque MD    Allergies:  No Known Allergies    Isolation:  None   Infection:  None   Code Status:  CPR    Ht:  162.6 cm (64\")   Wt:  61 kg (134 lb 8 oz)    Admission Cmt:  None   Principal Problem:  Retroperitoneal mass [R19.00]                 Active Insurance as of 2019     Primary Coverage     Payor Plan Insurance Group Employer/Plan Group    [employer] [place] [employer] [place]      Payor Plan Address Payor Plan Phone Number Payor Plan Fax Number Effective Dates    [address] [address] [phone]  6/10/2019 - None Entered    [facility] [address]       Subscriber Name Subscriber Birth Date Member ID       MARIANNE RODRIGUEZ 1975 [id number]                 Emergency Contacts      (Rel.) Home Phone Work Phone Mobile Phone    Nelia Kay (Mother) [phone] -- --        [address]  [address] [facility] [address]  Dept. Phone:  [phone]  Dept. Fax:   Date Ordered: 2019         Patient:  Marianne Rodriguez MRN:  [id number]   [address] [id number] :  1975  SSN:    Phone: [phone] Sex:  F     Weight: 61 kg (134 lb 8 oz)         Ht Readings from Last 1 Encounters:   19 162.6 cm (64\")         Miki               (Order ID: [id number])    Diagnosis:  " "Retroperitoneal mass (R19.00 [ICD-10-CM] 789.30 [ICD-9-CM])   Quantity:  1     Equipment:  Walker Folding with Wheels  Length of Need (99 Months = Lifetime): 99 Months = Lifetime        Authorizing Provider's Phone: [phone]   Verbal Order Mode: Per protocol: cosign required  Authorizing Provider: Rudolph Dumont MD  Authorizing Provider's NPI: [id number]     Order Entered By: Massiel Beck RN 6/17/2019 10:37 AM     Electronically signed by:  Rudolph Dumont MD 6/17/2019 11:05 AM                Physician Progress Notes (most recent note)      Renny Andrews MD at 6/17/2019  6:43 AM          Patient Name:  Marianne Rodriguez  YOB: 1975  [id number]    Surgery Progress Note    Date of visit: 6/17/2019    Subjective   Subjective: Feeling better, pain better controlled, having BM's, eating.        Objective     Objective:     /90 (BP Location: Right arm, Patient Position: Lying)   Pulse 84   Temp 98.1 °F (36.7 °C) (Oral)   Resp 18   Ht 162.6 cm (64\")   Wt 61 kg (134 lb 8 oz)   LMP 06/10/2019   SpO2 100%   BMI 23.09 kg/m²      Intake/Output Summary (Last 24 hours) at 6/17/2019 0643  Last data filed at 6/16/2019 1700  Gross per 24 hour   Intake 360 ml   Output --   Net 360 ml       CV:  Rhythm  regular and rate regular   L:  Clear  to auscultation bilaterally   Abd:  Bowel sounds positive , soft,  on right as expected, incision c/d/i  Ext:  No cyanosis, clubbing, edema    Recent labs that are back at this time have been reviewed.       Assessment/Plan     Assessment/ Plan:    Hospital Problem List     * (Principal) Retroperitoneal mass - Doing well after resection. OK to go home from my standpoint if pain controlled and she has outpatient PT for ambulation.    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)    Current every day smoker    Essential hypertension        GERD (gastroesophageal reflux disease)    Anxiety and depression    Tobacco abuse    "           Renny Andrews MD  2019  6:43 AM        Electronically signed by Renny Andrews MD at 2019  6:44 AM          Physical Therapy Notes (most recent note)      Annabelle Marques PTA at 2019  8:43 AM  Version 1 of 1         Acute Care - Physical Therapy Treatment Note   Angela     Patient Name: Marianne Rodriguez  : 1975  MRN: [id number]  Today's Date: 2019  Onset of Illness/Injury or Date of Surgery: 19  Date of Referral to PT: 19  Referring Physician: Tad Rodriguez MD    Admit Date: 2019    Visit Dx:    ICD-10-CM ICD-9-CM   1. 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)   • Current every day smoker   • Retroperitoneal mass   • Essential hypertension   • GERD (gastroesophageal reflux disease)   • Anxiety and depression   • Tobacco abuse       Therapy Treatment    Rehabilitation Treatment Summary     Row Name 19 0814             Treatment Time/Intention    Discipline  physical therapy assistant  -AS      Document Type  therapy note (daily note)  -AS      Subjective Information  complains of;pain  -AS      Mode of Treatment  physical therapy  -AS      Patient/Family Observations  no family at bedside, patient sitting UIC and agreed to therapy  -AS      Patient Effort  excellent  -AS      Existing Precautions/Restrictions  fall;other (see comments) adbominal pain and L arm pain h/o masectomy  -AS      Recorded by [AS] Annabelle Marques PTA 19 0841      Row Name 19 0814             Cognitive Assessment/Intervention- PT/OT    Affect/Mental Status (Cognitive)  WNL  -AS      Orientation Status (Cognition)  oriented x 4  -AS      Follows Commands (Cognition)  WNL  -AS      Cognitive Function (Cognitive)  WNL  -AS      Personal Safety Interventions  fall prevention program maintained;gait belt;nonskid shoes/slippers when out of bed  -AS       Recorded by [AS] Annabelle Marques, \[facility] [place] [facility]\"" 06/17/19 0841      Row Name 06/17/19 0814             Bed Mobility Assessment/Treatment    Comment (Bed Mobility)  not tested, patient sitting UIC pre/post tx  -AS      Recorded by [AS] Annabelle Marques, \[facility] [place] [facility]\"" 06/17/19 0841      Row Name 06/17/19 0814             Sit-Stand Transfer    Sit-Stand [place] (Transfers)  stand by assist  -AS      Assistive Device (Sit-Stand Transfers)  other (see comments) none  -AS      Recorded by [AS] Annabelle Marques, \[facility] [place] [facility]\"" 06/17/19 0841      Row Name 06/17/19 0814             Stand-Sit Transfer    Stand-Sit [place] (Transfers)  stand by assist  -AS      Assistive Device (Stand-Sit Transfers)  other (see comments) none  -AS      Recorded by [AS] Annabelle Marques, \[facility]\"" 06/17/19 0841      Row Name 06/17/19 0814             Gait/Stairs Assessment/Training    07719 - Gait Training Minutes   15  -AS      Gait/Stairs Assessment/Training  gait/ambulation assistive device  -AS      [place] Level (Gait)  contact guard  -AS      Assistive Device (Gait)  other (see comments) none  -AS      Distance in Feet (Gait)  175  -AS      Pattern (Gait)  step-through  -AS      Deviations/Abnormal Patterns (Gait)  bilateral deviations;josep decreased;gait speed decreased;stride length decreased  -AS      Comment (Gait/Stairs)  patient ambulated 175 feet with very slow pace, reports of abdominal pain and under left arm due to hysterectomy, weakness at end of walk.  -AS      Recorded by [AS] Annabelle Marques, \[facility] [place] [facility]\"" 06/17/19 0841      Row Name 06/17/19 0814             Motor Skills Assessment/Interventions    Additional Documentation  Therapeutic Exercise (Group)  -AS      Recorded by [AS] Annabelle Marques, \[facility] [place] [facility]\"" 06/17/19 0841      Row Name 06/17/19 0814             Therapeutic Exercise    13607 - PT Therapeutic Exercise Minutes  8  -AS      Recorded by [AS] Annabelle Marques, \[facility] [place] [facility]\"" 06/17/19 0841      Row Name 06/17/19 0814              Therapeutic Exercise    Upper Extremity Range of Motion (Therapeutic Exercise)  shoulder flexion/extension, bilateral;shoulder abduction/adduction, bilateral;elbow flexion/extension, bilateral  -AS      Lower Extremity Range of Motion (Therapeutic Exercise)  hip flexion/extension, bilateral;knee flexion/extension, bilateral;ankle dorsiflexion/plantar flexion, bilateral  -AS      Exercise Type (Therapeutic Exercise)  AROM (active range of motion)  -AS      Position (Therapeutic Exercise)  seated  -AS      Sets/Reps (Therapeutic Exercise)  1/10  -AS      Recorded by [AS] Annabelle Marques PTA 06/17/19 0841      Row Name 06/17/19 0814             Positioning and Restraints    Pre-Treatment Position  sitting in chair/recliner  -AS      Post Treatment Position  chair  -AS      In Chair  reclined;call light within reach;encouraged to call for assist  -AS      Recorded by [AS] Annabelle Marques PTA 06/17/19 0841      Row Name 06/17/19 0814             Pain Scale: Numbers Pre/Post-Treatment    Pain Scale: Numbers, Pretreatment  6/10  -AS      Pain Scale: Numbers, Post-Treatment  6/10  -AS      Pain Location - Side  Bilateral  -AS      Pain Location - Orientation  lower  -AS      Pain Location  abdomen  -AS      Pain Intervention(s)  Repositioned;Ambulation/increased activity  -AS      Recorded by [AS] Annabelle Marques PTA 06/17/19 0841      Row Name                Wound 06/13/19 1516 Other (See comments) abdomen incision    Wound - Properties Group Date first assessed: 06/13/19 [DH] Time first assessed: 1516 [DH] Side: Other (See comments) [DH] Location: abdomen [DH] Type: incision [DH] Recorded by:  [DH] Gin Carrillo RN 06/13/19 1516      User Key  (r) = Recorded By, (t) = Taken By, (c) = Cosigned By    Initials Name Effective Dates Discipline    AS Annabelle Marques PTA 06/22/15 -  PT     Gin Carrillo RN 06/16/16 -  Nurse          Wound 06/13/19 1516 Other (See comments) abdomen incision (Active)    Dressing Appearance open to air 6/17/2019  6:00 AM   Closure Staples 6/17/2019  6:00 AM   Base blanchable;clean;dry 6/17/2019  6:00 AM   Periwound intact;dry 6/17/2019  6:00 AM   Periwound Temperature warm 6/17/2019  6:00 AM   Edges rolled/closed 6/17/2019  6:00 AM   Drainage Amount none 6/17/2019  6:00 AM           Physical Therapy Education     Title: PT OT SLP Therapies (In Progress)     Topic: Physical Therapy (In Progress)     Point: Mobility training (In Progress)     Learning Progress Summary           Patient Acceptance, E, NR by AS at 6/17/2019  8:42 AM    Acceptance, E,TB, VU by PAUL at 6/15/2019  5:07 PM    Acceptance, E, VU,NR by GABRIELLA at 6/14/2019  9:33 AM                   Point: Home exercise program (In Progress)     Learning Progress Summary           Patient Acceptance, E, NR by AS at 6/17/2019  8:42 AM    Acceptance, E,TB, VU by PAUL at 6/15/2019  5:07 PM                   Point: Body mechanics (In Progress)     Learning Progress Summary           Patient Acceptance, E, NR by AS at 6/17/2019  8:42 AM    Acceptance, E,TB, VU by PAUL at 6/15/2019  5:07 PM    Acceptance, E, VU,NR by GABRIELLA at 6/14/2019  9:33 AM                   Point: Precautions (In Progress)     Learning Progress Summary           Patient Acceptance, E, NR by AS at 6/17/2019  8:42 AM    Acceptance, E,TB, VU by PAUL at 6/15/2019  5:07 PM    Acceptance, E, VU,NR by GABRIELLA at 6/14/2019  9:33 AM                               User Key     Initials Effective Dates Name Provider Type Discipline    AS 06/22/15 -  Annabelle Marques, PTA Physical Therapy Assistant PT    PAUL 06/23/17 -  Goldie Malik RN Registered Nurse Nurse    GABRIELLA 08/30/18 -  Kaya Wheeler PT Physical Therapist PT                PT Recommendation and Plan     Plan of Care Reviewed With: patient  Progress: improving  Outcome Summary: patient ambulated 175 feet with very slow pace, no AD needed, reports of abdominal pain and under left arm due to hysterectomy, weakness at end of  walk.  Outcome Measures     Row Name 06/17/19 0814 06/14/19 0933          How much help from another person do you currently need...    Turning from your back to your side while in flat bed without using bedrails?  3  -AS  4  -GABRIELLA     Moving from lying on back to sitting on the side of a flat bed without bedrails?  3  -AS  3  -GABRIELLA     Moving to and from a bed to a chair (including a wheelchair)?  3  -AS  3  -GABRIELLA     Standing up from a chair using your arms (e.g., wheelchair, bedside chair)?  4  -AS  3  -GABRIELLA     Climbing 3-5 steps with a railing?  3  -AS  2  -GABRIELLA     To walk in hospital room?  3  -AS  3  -GABRIELLA     AM-PAC 6 Clicks Score  19  -AS  18  -GABRIELLA        Functional Assessment    Outcome Measure Options  AM-PAC 6 Clicks Basic Mobility (PT)  -AS  AM-PAC 6 Clicks Basic Mobility (PT)  -GABRIELLA       User Key  (r) = Recorded By, (t) = Taken By, (c) = Cosigned By    Initials Name Provider Type    AS Annabelle Marques PTA Physical Therapy Assistant    Kaya Sorenson, PT Physical Therapist         Time Calculation:   PT Charges     Row Name 06/17/19 0814             Time Calculation    Start Time  0814  -AS      PT Received On  06/17/19  -AS      PT Goal Re-Cert Due Date  06/24/19  -AS         Timed Charges    23570 - PT Therapeutic Exercise Minutes  8  -AS      42718 - Gait Training Minutes   15  -AS        User Key  (r) = Recorded By, (t) = Taken By, (c) = Cosigned By    Initials Name Provider Type    AS Annabelle Marques PTA Physical Therapy Assistant        Therapy Charges for Today     Code Description Service Date Service Provider Modifiers Qty    75048101774 HC PT THER PROC EA 15 MIN 6/17/2019 Annabelle Marques PTA GP 1    56995862596 HC GAIT TRAINING EA 15 MIN 6/17/2019 Annabelle Marques PTA GP 1          PT G-Codes  Outcome Measure Options: AM-PAC 6 Clicks Basic Mobility (PT)  AM-PAC 6 Clicks Score: 19    Annabelle Marques PTA  6/17/2019         Electronically signed by Annabelle Marques PTA at  6/17/2019  8:43 AM

## 2019-06-17 NOTE — PROGRESS NOTES
[facility] Medicine Services  PROGRESS NOTE    Patient Name: Marianne Rodriguez  : 1975  MRN: [id number]    Date of Admission: 2019  Length of Stay: 4  Primary Care Physician: Doris Self APRN    Subjective     CC: f/u right retroperitoneal mass/abscess, s/p resection    HPI:  In bed, family at bedside. Appetite is poor but she is tolerating PO intake. Bowels have moved. Pain is manageable with pain meds. Right arm is swollen - IV was there and now out. A little bit painful.  Planning to ambulate more today and hopeful to go home tomorrow.      Review of Systems  Gen- No fevers, chills  CV- No chest pain, palpitations  Resp- No cough, dyspnea  GI- No N/V/D, (+) postsurgical abdominal pain    Otherwise ROS is negative except as mentioned in the HPI.    Objective     Vital Signs:   Temp:  [98.1 °F (36.7 °C)-98.4 °F (36.9 °C)] 98.1 °F (36.7 °C)  Heart Rate:  [80-91] 83  Resp:  [18] 18  BP: (116-146)/(78-93) 116/78        Physical Exam:  Constitutional: No acute distress, awake, alert, resting in bed  HENT: NCAT, mucous membranes moist  Respiratory: Clear to auscultation bilaterally, respiratory effort normal on room air  Cardiovascular: RRR, no murmurs, rubs, or gallops, palpable pedal pulses bilaterally  Gastrointestinal: Positive bowel sounds, soft, nontender, nondistended. Abdominal incision is c/d/i without erythema, edema or induration  Musculoskeletal: No bilateral ankle edema. RUE is diffusely edematous, no pitting, no palpable cord.   Psychiatric: Appropriate affect, cooperative  Neurologic: Oriented x 3, strength symmetric in all extremities, Cranial Nerves grossly intact to confrontation, speech clear  Skin: No rashes, abdominal incision intact    Results Reviewed:  I have personally reviewed current lab, radiology, and data and agree.    Results from last 7 days   Lab Units 19  0457 19  0452 06/15/19  0554   WBC 10*3/mm3 7.25 9.14 8.89    HEMOGLOBIN g/dL 8.8* 9.1* 8.7*   HEMATOCRIT % 27.7* 29.6* 27.9*   PLATELETS 10*3/mm3 453* 459* 431     Results from last 7 days   Lab Units 06/17/19  0457 06/16/19  0452 06/15/19  0554   SODIUM mmol/L 139 137 140   POTASSIUM mmol/L 3.7 3.9 4.1   CHLORIDE mmol/L 104 101 105   CO2 mmol/L 25.0 23.0 28.0   BUN mg/dL 9 14 15   CREATININE mg/dL 0.40* 0.44* 0.50*   GLUCOSE mg/dL 81 108* 99   CALCIUM mg/dL 8.3* 8.3* 8.6     Estimated Creatinine Clearance: 176.4 mL/min (A) (by C-G formula based on SCr of 0.4 mg/dL (L)).    Microbiology Results Abnormal     None        Imaging Results (last 24 hours)     ** No results found for the last 24 hours. **        I have reviewed the medications:  Scheduled Meds:    bisacodyl 10 mg Oral Daily   celecoxib 200 mg Oral Q12H   diazePAM 4 mg Oral Q12H   docusate sodium 100 mg Oral BID   DULoxetine 60 mg Oral Daily   enoxaparin 40 mg Subcutaneous Daily   gabapentin 600 mg Oral Q8H   pantoprazole 40 mg Oral Q AM   polyethylene glycol 17 g Oral Daily   sennosides-docusate sodium 2 tablet Oral BID     Continuous Infusions:     PRN Meds:  •  acetaminophen **OR** acetaminophen **OR** acetaminophen  •  albuterol  •  bisacodyl  •  HYDROmorphone **AND** naloxone  •  hydrOXYzine  •  magnesium hydroxide  •  magnesium sulfate **OR** magnesium sulfate in D5W 1g/100mL (PREMIX) **OR** magnesium sulfate  •  ondansetron **OR** ondansetron  •  oxyCODONE-acetaminophen  •  oxyCODONE-acetaminophen  •  oxyCODONE-acetaminophen  •  promethazine **OR** promethazine    Assessment / Plan     Active Hospital Problems    Diagnosis POA   • **Retroperitoneal mass [R19.00] Yes   • Essential hypertension [I10] Yes   • GERD (gastroesophageal reflux disease) [K21.9] Yes   • Anxiety and depression [F41.9, F32.9] Yes   • Tobacco abuse [Z72.0] Yes   • 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  female with PMH significant for HTN, GERD, anxiety/depression, peripheral neuropathy and cervical dysplasia. 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 She underwent mastectomy, chemotherapy and radiation. She was followed by Dr. Avila of [place] but was discharged from the practice after missing too many appointments. She established care with Dr. Charlotte Mann. An MRI had recently been obtained at  and she was found to have a cystic lesion in the lower pelvis. She was referred to Dr. Luque for further evaluation. CT chest/abdomen/pelvis 5/31/19 showed abnormal soft tissue posterior to right psoas along right pelvic sidewall superiorly concerning for either infectious process or metastatic process.    [facility] and underwent radical resection or right retroperitoneal tumor. Pathology was consistent w/ acute and subacute inflammation w/ organized fat necrosis and focal areas of necrosis w/ apparent abscess formation but negative for tumor or granuloma. hospitalists were consulted postoperatively for medical comanagement    *Retroperitoneal mass, consistent w/ chronic abscess of unclear etiology   -s/p radical resection right retroperitoneal mass (pathology c/w inflammation/chronic abscess)   -received 3 doses perioperative zosyn, now monitoring off antibiotics  *post operative ileus  *Hx angiosarcoma left breast    -s/p previous mastectomy, chemo, & xrt; follows w/ dr. Mann now  *hx HTN, currently borderline hypotension  *gerd  --------------------------------------------------------------------------    Plan:  -post op care & diet per Dr. Luque  -mobilize/pul toilet  -monitor off abx; monitor wbc, temp.    -restart home lisinopril at 5mg today  -stop IV fluids, dc IV and check RUE US to rule out DVT due to RUE edema     -am labs: CBC, BMP    Dispo: Home tomorrow?  DVT Prophylaxis:  Sq lovenox    CODE STATUS:   Code Status and  Medical Interventions:   Ordered at: 06/13/19 1750     Level Of Support Discussed With:    Patient     Code Status:    CPR     Medical Interventions (Level of Support Prior to Arrest):    Full     Electronically signed by Lauren Bishop PA-C, 06/17/19, 12:43 PM.

## 2019-06-18 VITALS
HEART RATE: 79 BPM | TEMPERATURE: 98.1 F | DIASTOLIC BLOOD PRESSURE: 85 MMHG | HEIGHT: 64 IN | BODY MASS INDEX: 22.96 KG/M2 | WEIGHT: 134.5 LBS | SYSTOLIC BLOOD PRESSURE: 127 MMHG | OXYGEN SATURATION: 100 % | RESPIRATION RATE: 18 BRPM

## 2019-06-18 LAB
ANION GAP SERPL CALCULATED.3IONS-SCNC: 10 MMOL/L
BUN BLD-MCNC: 10 MG/DL (ref 6–20)
BUN/CREAT SERPL: 23.8 (ref 7–25)
CALCIUM SPEC-SCNC: 8.3 MG/DL (ref 8.6–10.5)
CHLORIDE SERPL-SCNC: 105 MMOL/L (ref 98–107)
CO2 SERPL-SCNC: 27 MMOL/L (ref 22–29)
CREAT BLD-MCNC: 0.42 MG/DL (ref 0.57–1)
DEPRECATED RDW RBC AUTO: 50.9 FL (ref 37–54)
ERYTHROCYTE [DISTWIDTH] IN BLOOD BY AUTOMATED COUNT: 14.7 % (ref 12.3–15.4)
GFR SERPL CREATININE-BSD FRML MDRD: >150 ML/MIN/1.73
GLUCOSE BLD-MCNC: 88 MG/DL (ref 65–99)
HCT VFR BLD AUTO: 27.4 % (ref 34–46.6)
HGB BLD-MCNC: 8.6 G/DL (ref 12–15.9)
MCH RBC QN AUTO: 29.4 PG (ref 26.6–33)
MCHC RBC AUTO-ENTMCNC: 31.4 G/DL (ref 31.5–35.7)
MCV RBC AUTO: 93.5 FL (ref 79–97)
PLATELET # BLD AUTO: 459 10*3/MM3 (ref 140–450)
PMV BLD AUTO: 8.8 FL (ref 6–12)
POTASSIUM BLD-SCNC: 3.8 MMOL/L (ref 3.5–5.2)
RBC # BLD AUTO: 2.93 10*6/MM3 (ref 3.77–5.28)
SODIUM BLD-SCNC: 142 MMOL/L (ref 136–145)
WBC NRBC COR # BLD: 6.21 10*3/MM3 (ref 3.4–10.8)

## 2019-06-18 PROCEDURE — 25010000002 ENOXAPARIN PER 10 MG: Performed by: SURGERY

## 2019-06-18 PROCEDURE — 85027 COMPLETE CBC AUTOMATED: CPT | Performed by: PHYSICIAN ASSISTANT

## 2019-06-18 PROCEDURE — 94799 UNLISTED PULMONARY SVC/PX: CPT

## 2019-06-18 PROCEDURE — 99238 HOSP IP/OBS DSCHRG MGMT 30/<: CPT | Performed by: INTERNAL MEDICINE

## 2019-06-18 PROCEDURE — 80048 BASIC METABOLIC PNL TOTAL CA: CPT | Performed by: PHYSICIAN ASSISTANT

## 2019-06-18 RX ORDER — ONDANSETRON 4 MG/1
4 TABLET, FILM COATED ORAL EVERY 6 HOURS PRN
Qty: 24 TABLET | Refills: 0 | Status: SHIPPED | OUTPATIENT
Start: 2019-06-18 | End: 2020-01-01

## 2019-06-18 RX ORDER — OXYCODONE AND ACETAMINOPHEN 7.5; 325 MG/1; MG/1
1 TABLET ORAL EVERY 6 HOURS PRN
Status: DISCONTINUED | OUTPATIENT
Start: 2019-06-18 | End: 2019-06-18 | Stop reason: HOSPADM

## 2019-06-18 RX ORDER — OXYCODONE AND ACETAMINOPHEN 7.5; 325 MG/1; MG/1
1 TABLET ORAL EVERY 6 HOURS PRN
Qty: 24 TABLET | Refills: 0 | Status: SHIPPED | OUTPATIENT
Start: 2019-06-18 | End: 2020-01-01

## 2019-06-18 RX ORDER — SENNA AND DOCUSATE SODIUM 50; 8.6 MG/1; MG/1
2 TABLET, FILM COATED ORAL 2 TIMES DAILY
Qty: 14 TABLET
Start: 2019-06-18 | End: 2019-06-25

## 2019-06-18 RX ORDER — ACETAMINOPHEN 325 MG/1
650 TABLET ORAL EVERY 8 HOURS PRN
Start: 2019-06-18

## 2019-06-18 RX ORDER — LISINOPRIL 5 MG/1
5 TABLET ORAL
Start: 2019-06-19 | End: 2020-01-01

## 2019-06-18 RX ADMIN — DOCUSATE SODIUM 100 MG: 100 CAPSULE, LIQUID FILLED ORAL at 08:08

## 2019-06-18 RX ADMIN — ENOXAPARIN SODIUM 40 MG: 40 INJECTION SUBCUTANEOUS at 08:07

## 2019-06-18 RX ADMIN — GABAPENTIN 600 MG: 300 CAPSULE ORAL at 05:14

## 2019-06-18 RX ADMIN — POLYETHYLENE GLYCOL 3350 17 G: 17 POWDER, FOR SOLUTION ORAL at 08:08

## 2019-06-18 RX ADMIN — CELECOXIB 200 MG: 200 CAPSULE ORAL at 08:08

## 2019-06-18 RX ADMIN — BISACODYL 10 MG: 5 TABLET, COATED ORAL at 08:08

## 2019-06-18 RX ADMIN — DULOXETINE HYDROCHLORIDE 60 MG: 60 CAPSULE, DELAYED RELEASE ORAL at 08:08

## 2019-06-18 RX ADMIN — DIAZEPAM 2 MG: 2 TABLET ORAL at 08:08

## 2019-06-18 RX ADMIN — LISINOPRIL 5 MG: 5 TABLET ORAL at 08:08

## 2019-06-18 RX ADMIN — PANTOPRAZOLE SODIUM 40 MG: 40 TABLET, DELAYED RELEASE ORAL at 05:14

## 2019-06-18 RX ADMIN — SENNOSIDES,DOCUSATE SODIUM 2 TABLET: 8.6; 5 TABLET, FILM COATED ORAL at 08:08

## 2019-06-18 RX ADMIN — OXYCODONE HYDROCHLORIDE AND ACETAMINOPHEN 1 TABLET: 10; 325 TABLET ORAL at 03:18

## 2019-06-18 NOTE — PLAN OF CARE
Problem: Patient Care Overview  Goal: Plan of Care Review  Outcome: Ongoing (interventions implemented as appropriate)   06/18/19 0625   Coping/Psychosocial   Plan of Care Reviewed With patient   Plan of Care Review   Progress improving   OTHER   Outcome Summary Patient appears to be improving. Potentially ready for discharge. Pain will controlled with just PO pain meds. On RA. Vitaks stable. Has had a B in the last 24 hours. Will continue to monitor.        Problem: Fall Risk (Adult)  Goal: Identify Related Risk Factors and Signs and Symptoms  Outcome: Ongoing (interventions implemented as appropriate)   06/18/19 0625   Fall Risk (Adult)   Related Risk Factors (Fall Risk) fatigue/slow reaction;environment unfamiliar   Signs and Symptoms (Fall Risk) presence of risk factors       Problem: Surgery Nonspecified (Adult)  Goal: Signs and Symptoms of Listed Potential Problems Will be Absent, Minimized or Managed (Surgery Nonspecified)  Outcome: Ongoing (interventions implemented as appropriate)   06/18/19 0625   Goal/Outcome Evaluation   Problems Assessed (Surgery) all   Problems Present (Surgery) pain       Problem: Pain, Chronic (Adult)  Goal: Identify Related Risk Factors and Signs and Symptoms  Outcome: Ongoing (interventions implemented as appropriate)   06/18/19 0625   Pain, Chronic (Adult)   Related Risk Factors (Chronic Pain) surgery   Signs and Symptoms (Chronic Pain) verbalization of pain descriptors     Goal: Acceptable Pain/Comfort Level and Functional Ability  Outcome: Ongoing (interventions implemented as appropriate)   06/18/19 0625   Pain, Chronic (Adult)   Acceptable Pain/Comfort Level and Functional Ability making progress toward outcome

## 2019-06-18 NOTE — PROGRESS NOTES
Case Management Discharge Note    Final Note: Spoke with patient at bedside, she is aware of her discharge orders for today - her rolling walker is at bedside for home from [facility]. Her mother plans to transport her home when medically ready - no other needs identified for discharge - juvenal [phone]     Destination      No service has been selected for the patient.      Durable Medical Equipment - Selection Complete      Service Provider Request Status Selected Services Address Phone Number Fax Number    [facility] Selected Durable Medical Equipment [address] [facility] [address] [phone]      Dialysis/Infusion      No service has been selected for the patient.      Home Medical Care      No service has been selected for the patient.      Therapy      No service has been selected for the patient.      Community Resources      No service has been selected for the patient.             Final Discharge Disposition Code: 01 - home or self-care

## 2019-06-18 NOTE — DISCHARGE SUMMARY
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      ----------------Final Diagnoses-----------------  *Retroperitoneal mass (s/p resection)   -pathology c/w inflammation/chronic abscess  *Hx angiosarcoma left breast               -s/p previous mastectomy, chemo, & xrt; follows w/ dr. Mann now  *hx htn  *gerd  ----------------------------------------------------------        Hospital Course:  Marianne Rodriguez is a 44 y.o. female with PMH significant for HTN, GERD, anxiety/depression, peripheral neuropathy and cervical dysplasia. She was diagnosed with angiosarcoma of the L breast in 2017. She underwent mastectomy, chemotherapy and radiation. She was followed by Dr. Avila of [place] but was discharged from the practice after missing too many appointments. She established care with Dr. Charlotte Mann. An MRI had recently been obtained at  and she was found to have a cystic lesion in the lower pelvis. She was referred to Dr. Luque for further  evaluation. CT chest/abdomen/pelvis 5/31/19 showed abnormal soft tissue posterior to right psoas along right pelvic sidewall superiorly concerning for either infectious process or metastatic process.               Patient was admitted to [facility] on 6/13/19 and underwent radical resection or right retroperitoneal tumor. Pathology was consistent w/ acute and subacute inflammation w/ organized fat necrosis and focal areas of necrosis w/ apparent abscess formation but negative for tumor or granuloma. Had postoperative ileus which has now resolved. Patient is tolerating diet, having bm's and eager to go home. Has been cleared for discharge by Dr. Rizzo or surgery      Day of Discharge     HPI:   Pain was controlled having bm's. No dyspnea. Eager to go home    Review of Systems  No headache, no chest pain    Otherwise ROS is negative except as mentioned in the HPI.    Vital Signs:   Temp:  [98 °F (36.7 °C)-98.4 °F (36.9 °C)] 98.1 °F (36.7 °C)  Heart Rate:  [72-79] 79  Resp:  [18] 18  BP: (100-127)/(63-85) 127/85     Physical Exam:  Alert, notoxic, ox4  Ncat, oroph clear  rrr  ctab  abd +bs, incision c/d/i, appropriate minimal post op tenderness, no rebound, no guarding  No cce    Pertinent  and/or Most Recent Results     Results from last 7 days   Lab Units 06/18/19  0722 06/17/19  0457 06/16/19  0452 06/15/19  0554 06/14/19  0500   WBC 10*3/mm3 6.21 7.25 9.14 8.89 13.94*   HEMOGLOBIN g/dL 8.6* 8.8* 9.1* 8.7* 9.7*   HEMATOCRIT % 27.4* 27.7* 29.6* 27.9* 30.5*   PLATELETS 10*3/mm3 459* 453* 459* 431 474*   SODIUM mmol/L 142 139 137 140 137   POTASSIUM mmol/L 3.8 3.7 3.9 4.1 4.2   CHLORIDE mmol/L 105 104 101 105 100   CO2 mmol/L 27.0 25.0 23.0 28.0 25.0   BUN mg/dL 10 9 14 15 8   CREATININE mg/dL 0.42* 0.40* 0.44* 0.50* 0.42*   GLUCOSE mg/dL 88 81 108* 99 145*   CALCIUM mg/dL 8.3* 8.3* 8.3* 8.6 8.5*           Invalid input(s): PROT, LABALBU        Invalid input(s): TG, LDLCALC, LDLREALC        Brief Urine  Lab Results  (Last result in the past 365 days)      Color   Clarity   Blood   Leuk Est   Nitrite   Protein   CREAT   Urine HCG        06/13/19 1241               Negative           Microbiology Results Abnormal     None          Imaging Results (all)     None          Results for orders placed during the hospital encounter of 06/13/19   Duplex Venous Upper Extremity - Right CAR    Narrative · Normal right upper extremity venous duplex scan.          Results for orders placed during the hospital encounter of 06/13/19   Duplex Venous Upper Extremity - Right CAR    Narrative · Normal right upper extremity venous duplex scan.                 Discharge Details        Discharge Medications      New Medications      Instructions Start Date   acetaminophen 325 MG tablet  Commonly known as:  TYLENOL   650 mg, Oral, Every 8 Hours PRN      ondansetron 4 MG tablet  Commonly known as:  ZOFRAN   4 mg, Oral, Every 6 Hours PRN      oxyCODONE-acetaminophen 7.5-325 MG per tablet  Commonly known as:  PERCOCET   1 tablet, Oral, Every 6 Hours PRN      PHARMACY MEDS TO BED CONSULT   Does not apply, Daily      polyethylene glycol pack packet  Commonly known as:  MIRALAX   17 g, Oral, Daily   Start Date:  6/19/2019     sennosides-docusate sodium 8.6-50 MG tablet  Commonly known as:  SENOKOT-S   2 tablets, Oral, 2 Times Daily         Changes to Medications      Instructions Start Date   lisinopril 5 MG tablet  Commonly known as:  PRINIVIL,ZESTRIL  What changed:    · medication strength  · how much to take  · when to take this   5 mg, Oral, Every 24 Hours Scheduled   Start Date:  6/19/2019        Continue These Medications      Instructions Start Date   albuterol sulfate  (90 Base) MCG/ACT inhaler  Commonly known as:  PROVENTIL HFA;VENTOLIN HFA;PROAIR HFA   INL 2 INHALATIONS PO QID PRF WHZ      DULoxetine 60 MG capsule  Commonly known as:  CYMBALTA   TK ONE C PO QD      gabapentin 300 MG capsule  Commonly known as:  NEURONTIN   600  mg, Oral, 3 Times Daily      hydrOXYzine 25 MG tablet  Commonly known as:  ATARAX   TK 1 T PO QHS      pantoprazole 40 MG EC tablet  Commonly known as:  PROTONIX   40 mg, Daily      vitamin B-12 1000 MCG tablet  Commonly known as:  CYANOCOBALAMIN   1,000 mcg, Oral, Daily      vitamin D3 5000 units capsule capsule   TK 1 C PO QD             No Known Allergies      Discharge Disposition:  Home or Self Care    Discharge Diet:  No active diet order        Discharge Activity:   Activity Instructions     No heavy lifting until cleared by Dr. Ott.  No more than 10LBS.                   CODE STATUS:    Code Status and Medical Interventions:   Ordered at: 06/13/19 1750     Level Of Support Discussed With:    Patient     Code Status:    CPR     Medical Interventions (Level of Support Prior to Arrest):    Full         Future Appointments   Date Time Provider Department Center   8/27/2019  1:00 PM Shannon Rico APRN [facility]       Additional Instructions for the Follow-ups that You Need to Schedule     Discharge Follow-up with PCP   As directed       Currently Documented PCP:    Doris Self APRN    PCP Phone Number:    [phone]     Follow Up Details:  1 week         Discharge Follow-up with Specialty: dr. ott (surgery) 1-2 weeks   As directed      Specialty:  dr. ott (surgery) 1-2 weeks               Time Spent on Discharge:  25 min    Electronically signed by Rudolph Dumont MD, 06/18/19, 4:23 PM.

## 2019-06-18 NOTE — PROGRESS NOTES
"General Surgery Daily Progress Note    Subjective:  No complaints.  Feels well.    Objective:  /85 (BP Location: Right arm, Patient Position: Lying)   Pulse 79   Temp 98.1 °F (36.7 °C) (Oral)   Resp 18   Ht 162.6 cm (64\")   Wt 61 kg (134 lb 8 oz)   LMP 06/10/2019   SpO2 100%   BMI 23.09 kg/m²     Physical Exam:  General: NAD, AAO x 3   Abdomen:  Soft, NT, ND.   Incision: c/d/i.      Imaging Results (last 24 hours)     ** No results found for the last 24 hours. **          CBC:  Results from last 7 days   Lab Units 06/18/19  0722   WBC 10*3/mm3 6.21   HEMOGLOBIN g/dL 8.6*   HEMATOCRIT % 27.4*   PLATELETS 10*3/mm3 459*       CMP:  Results from last 7 days   Lab Units 06/18/19  0722   SODIUM mmol/L 142   POTASSIUM mmol/L 3.8   CHLORIDE mmol/L 105   CO2 mmol/L 27.0   BUN mg/dL 10   CREATININE mg/dL 0.42*   CALCIUM mg/dL 8.3*   GLUCOSE mg/dL 88         Assessment  POD #5 for 44 year old female s/p resection of retroperitoneal mass.  Doing well.  She is safe for discharge home today.    Plan:   - Safe for discharge home today    Shantal Rizzo MD - 6/18/2019, 11:52 AM          "

## 2019-06-18 NOTE — DISCHARGE INSTR - APPOINTMENTS
You have an appointment with Tad Luque MD  on June 26,2019 @ 3:00 PM.   Call them if you have any questions. Phone: [phone]  [facility] [address]    You have an appointment with Doris Self APRN  on June 25, 2019 @ 11:30 AM.   Call them if you have any questions. Phone: [phone]  [address] [place], [place] [address]AJN SWARTZ  [facility] [address]

## 2019-06-18 NOTE — DISCHARGE INSTRUCTIONS
No driving or operating heavy machinery while taking opioids or benzodiazepines (percocet or valium)    Return if fever, n/v, worsening abdominal pain

## 2019-06-19 ENCOUNTER — READMISSION MANAGEMENT (OUTPATIENT)
Dept: CALL CENTER | Facility: HOSPITAL | Age: 44
End: 2019-06-19

## 2019-06-19 NOTE — OUTREACH NOTE
Prep Survey      Responses   Facility patient discharged from?  [place]   Is patient eligible?  Yes   Discharge diagnosis  Retroperitoneal mass (s/p resection),  Hx angiosarcoma left breast    Does the patient have one of the following disease processes/diagnoses(primary or secondary)?  General Surgery   Does the patient have Home health ordered?  No   Is there a DME ordered?  Yes   What DME was ordered?  Rolling walker- [employer]   General alerts for this patient  RADICAL RESECTION OF RETROPERITONEAL TUMOR, INTRAOPERATIVE ULTRASOUND   Prep survey completed?  Yes          Kiley Flores RN

## 2019-06-20 ENCOUNTER — READMISSION MANAGEMENT (OUTPATIENT)
Dept: CALL CENTER | Facility: HOSPITAL | Age: 44
End: 2019-06-20

## 2019-06-20 NOTE — OUTREACH NOTE
General Surgery Week 1 Survey      Responses   Facility patient discharged from?  [place]   Does the patient have one of the following disease processes/diagnoses(primary or secondary)?  General Surgery   Is there a successful TCM telephone encounter documented?  No   Week 1 attempt successful?  Yes   Call start time  1503   Rescheduled  Rescheduled-pt requested [patient is having spasms and will be calling her surgeon]   Call end time  1506          Kallie Knight RN

## 2019-06-21 ENCOUNTER — READMISSION MANAGEMENT (OUTPATIENT)
Dept: CALL CENTER | Facility: HOSPITAL | Age: 44
End: 2019-06-21

## 2019-06-21 NOTE — OUTREACH NOTE
General Surgery Week 1 Survey      Responses   Facility patient discharged from?  [place]   Does the patient have one of the following disease processes/diagnoses(primary or secondary)?  General Surgery   Is there a successful TCM telephone encounter documented?  No   Week 1 attempt successful?  Yes   Call start time  1338   Call end time  1343   General alerts for this patient  RADICAL RESECTION OF RETROPERITONEAL TUMOR, INTRAOPERATIVE ULTRASOUND   Discharge diagnosis  Retroperitoneal mass (s/p resection),  Hx angiosarcoma left breast    Meds reviewed with patient/caregiver?  Yes   Is the patient having any side effects they believe may be caused by any medication additions or changes?  No   Does the patient have all medications related to this admission filled (includes all antibiotics, pain medications, etc.)  Yes   Is the patient taking all medications as directed (includes completed medication regime)?  Yes   Does the patient have a follow up appointment scheduled with their surgeon?  Yes   Has the patient kept scheduled appointments due by today?  Yes   Psychosocial issues?  No   Did the patient receive a copy of their discharge instructions?  Yes   Nursing interventions  Reviewed instructions with patient   What is the patient's perception of their health status since discharge?  Same   Nursing interventions  Nurse provided patient education   Is the patient /caregiver able to teach back basic post-op care?  Continue use of incentive spirometry at least 1 week post discharge, Lifting as instructed by MD in discharge instructions, Keep incision areas clean,dry and protected   Is the patient/caregiver able to teach back signs and symptoms of incisional infection?  Increased redness, swelling or pain at the incisonal site, Increased drainage or bleeding, Incisional warmth, Pus or odor from incision, Fever   Is the patient/caregiver able to teach back steps to recovery at home?  Set small, achievable goals for  return to baseline health, Make a list of questions for surgeon's appointment, Eat a well-balance diet   Is the patient/caregiver able to teach back the hierarchy of who to call/visit for symptoms/problems? PCP, Specialist, Home health nurse, Urgent Care, ED, 911  Yes   Week 1 call completed?  Yes   Wrap up additional comments  pt stated she is having spasms has a call in to get some valium          Berenice Turpin RN

## 2019-06-28 ENCOUNTER — READMISSION MANAGEMENT (OUTPATIENT)
Dept: CALL CENTER | Facility: HOSPITAL | Age: 44
End: 2019-06-28

## 2019-06-28 NOTE — OUTREACH NOTE
General Surgery Week 2 Survey      Responses   Facility patient discharged from?  [place]   Does the patient have one of the following disease processes/diagnoses(primary or secondary)?  General Surgery   Week 2 attempt successful?  Yes   Call start time  1654   Call end time  1700   General alerts for this patient  RADICAL RESECTION OF RETROPERITONEAL TUMOR, INTRAOPERATIVE ULTRASOUND   Discharge diagnosis  Retroperitoneal mass (s/p resection),  Hx angiosarcoma left breast    Is patient permission given to speak with other caregiver?  Yes   List who call center can speak with  mother   Meds reviewed with patient/caregiver?  Yes   Is the patient having any side effects they believe may be caused by any medication additions or changes?  No   Is the patient taking all medications as directed (includes completed medication regime)?  Yes   Does the patient have a follow up appointment scheduled with their surgeon?  No   What is preventing the patient from scheduling follow up appointments?  Waiting on return call   Has the patient kept scheduled appointments due by today?  N/A   Psychosocial issues?  No   Comments  Pt c/o of some pain, she is out of pain meds and plans to call MD.    What is the patient's perception of their health status since discharge?  Improving   Nursing interventions  Nurse provided patient education   Is the patient /caregiver able to teach back basic post-op care?  Continue use of incentive spirometry at least 1 week post discharge, Take showers only when approved by MD-sponge bathe until then, Do not remove steri-strips   Is the patient/caregiver able to teach back signs and symptoms of incisional infection?  Pus or odor from incision   Is the patient/caregiver able to teach back steps to recovery at home?  Eat a well-balance diet, Weigh daily   Is the patient/caregiver able to teach back the hierarchy of who to call/visit for symptoms/problems? PCP, Specialist, Home health nurse, Urgent Care,  ED, 911  Yes   Week 2 call completed?  Yes          Fabiola Ayala, RN

## 2019-07-05 ENCOUNTER — READMISSION MANAGEMENT (OUTPATIENT)
Dept: CALL CENTER | Facility: HOSPITAL | Age: 44
End: 2019-07-05

## 2019-07-05 NOTE — OUTREACH NOTE
General Surgery Week 3 Survey      Responses   Facility patient discharged from?  [place]   Does the patient have one of the following disease processes/diagnoses(primary or secondary)?  General Surgery   Week 3 attempt successful?  No   Unsuccessful attempts  Attempt 1          Constance Reyes RN

## 2019-07-08 ENCOUNTER — READMISSION MANAGEMENT (OUTPATIENT)
Dept: CALL CENTER | Facility: HOSPITAL | Age: 44
End: 2019-07-08

## 2019-07-08 NOTE — OUTREACH NOTE
General Surgery Week 3 Survey      Responses   Facility patient discharged from?  [place]   Does the patient have one of the following disease processes/diagnoses(primary or secondary)?  General Surgery   Week 3 attempt successful?  No   Unsuccessful attempts  Attempt 2          Zeke Chen RN

## 2019-08-14 ENCOUNTER — HOSPITAL ENCOUNTER (OUTPATIENT)
Dept: CT IMAGING | Facility: HOSPITAL | Age: 44
End: 2019-08-14

## 2019-08-22 ENCOUNTER — HOSPITAL ENCOUNTER (OUTPATIENT)
Dept: CT IMAGING | Facility: HOSPITAL | Age: 44
Discharge: HOME | End: 2019-08-22
Admitting: INTERNAL MEDICINE

## 2019-08-22 DIAGNOSIS — C50.912 ANGIOSARCOMA OF FEMALE BREAST, LEFT (HCC): ICD-10-CM

## 2019-08-22 PROCEDURE — 25010000002 IOPAMIDOL 61 % SOLUTION: Performed by: INTERNAL MEDICINE

## 2019-08-22 PROCEDURE — 74177 CT ABD & PELVIS W/CONTRAST: CPT

## 2019-08-22 PROCEDURE — 71260 CT THORAX DX C+: CPT

## 2019-08-22 RX ADMIN — IOPAMIDOL 95 ML: 612 INJECTION, SOLUTION INTRAVENOUS at 11:13

## 2019-08-26 ENCOUNTER — APPOINTMENT (OUTPATIENT)
Dept: LAB | Facility: HOSPITAL | Age: 44
End: 2019-08-26

## 2019-08-26 ENCOUNTER — OFFICE VISIT (OUTPATIENT)
Dept: ONCOLOGY | Facility: CLINIC | Age: 44
End: 2019-08-26

## 2019-08-26 VITALS
HEIGHT: 64 IN | WEIGHT: 108 LBS | RESPIRATION RATE: 16 BRPM | DIASTOLIC BLOOD PRESSURE: 86 MMHG | TEMPERATURE: 97.6 F | HEART RATE: 73 BPM | SYSTOLIC BLOOD PRESSURE: 155 MMHG | BODY MASS INDEX: 18.44 KG/M2 | OXYGEN SATURATION: 100 %

## 2019-08-26 DIAGNOSIS — R93.89 ABNORMAL CT SCAN: ICD-10-CM

## 2019-08-26 DIAGNOSIS — D64.9 ANEMIA, UNSPECIFIED TYPE: ICD-10-CM

## 2019-08-26 DIAGNOSIS — C50.912 ANGIOSARCOMA OF LEFT FEMALE BREAST (HCC): Primary | ICD-10-CM

## 2019-08-26 LAB
ALBUMIN SERPL-MCNC: 4.3 G/DL (ref 3.5–5.2)
ALBUMIN/GLOB SERPL: 1.3 G/DL
ALP SERPL-CCNC: 74 U/L (ref 39–117)
ALT SERPL W P-5'-P-CCNC: 16 U/L (ref 1–33)
ANION GAP SERPL CALCULATED.3IONS-SCNC: 9 MMOL/L (ref 5–15)
AST SERPL-CCNC: 19 U/L (ref 1–32)
BILIRUB SERPL-MCNC: 0.2 MG/DL (ref 0.2–1.2)
BUN BLD-MCNC: 13 MG/DL (ref 6–20)
BUN/CREAT SERPL: 20 (ref 7–25)
CALCIUM SPEC-SCNC: 8.9 MG/DL (ref 8.6–10.5)
CHLORIDE SERPL-SCNC: 104 MMOL/L (ref 98–107)
CO2 SERPL-SCNC: 27 MMOL/L (ref 22–29)
CREAT BLD-MCNC: 0.65 MG/DL (ref 0.57–1)
ERYTHROCYTE [DISTWIDTH] IN BLOOD BY AUTOMATED COUNT: 19.5 % (ref 12.3–15.4)
FERRITIN SERPL-MCNC: 46.76 NG/ML (ref 13–150)
GFR SERPL CREATININE-BSD FRML MDRD: 100 ML/MIN/1.73
GLOBULIN UR ELPH-MCNC: 3.2 GM/DL
GLUCOSE BLD-MCNC: 94 MG/DL (ref 65–99)
HCT VFR BLD AUTO: 40.9 % (ref 34–46.6)
HGB BLD-MCNC: 13.3 G/DL (ref 12–15.9)
IRON 24H UR-MRATE: 68 MCG/DL (ref 37–145)
IRON SATN MFR SERPL: 17 % (ref 20–50)
LYMPHOCYTES # BLD AUTO: 1.6 10*3/MM3 (ref 0.7–3.1)
LYMPHOCYTES NFR BLD AUTO: 29 % (ref 19.6–45.3)
MCH RBC QN AUTO: 30.2 PG (ref 26.6–33)
MCHC RBC AUTO-ENTMCNC: 32.6 G/DL (ref 31.5–35.7)
MCV RBC AUTO: 92.6 FL (ref 79–97)
MONOCYTES # BLD AUTO: 0.6 10*3/MM3 (ref 0.1–0.9)
MONOCYTES NFR BLD AUTO: 11 % (ref 5–12)
NEUTROPHILS # BLD AUTO: 3.2 10*3/MM3 (ref 1.7–7)
NEUTROPHILS NFR BLD AUTO: 60 % (ref 42.7–76)
PLATELET # BLD AUTO: 374 10*3/MM3 (ref 140–450)
PMV BLD AUTO: 6.9 FL (ref 6–12)
POTASSIUM BLD-SCNC: 4.5 MMOL/L (ref 3.5–5.2)
PROT SERPL-MCNC: 7.5 G/DL (ref 6–8.5)
RBC # BLD AUTO: 4.41 10*6/MM3 (ref 3.77–5.28)
SODIUM BLD-SCNC: 140 MMOL/L (ref 136–145)
TIBC SERPL-MCNC: 392 MCG/DL (ref 298–536)
TRANSFERRIN SERPL-MCNC: 263 MG/DL (ref 200–360)
WBC NRBC COR # BLD: 5.4 10*3/MM3 (ref 3.4–10.8)

## 2019-08-26 PROCEDURE — 36415 COLL VENOUS BLD VENIPUNCTURE: CPT | Performed by: NURSE PRACTITIONER

## 2019-08-26 PROCEDURE — 99214 OFFICE O/P EST MOD 30 MIN: CPT | Performed by: NURSE PRACTITIONER

## 2019-08-26 PROCEDURE — 82728 ASSAY OF FERRITIN: CPT | Performed by: NURSE PRACTITIONER

## 2019-08-26 PROCEDURE — 84466 ASSAY OF TRANSFERRIN: CPT | Performed by: NURSE PRACTITIONER

## 2019-08-26 PROCEDURE — 85025 COMPLETE CBC W/AUTO DIFF WBC: CPT | Performed by: NURSE PRACTITIONER

## 2019-08-26 PROCEDURE — 80053 COMPREHEN METABOLIC PANEL: CPT | Performed by: NURSE PRACTITIONER

## 2019-08-26 PROCEDURE — 83540 ASSAY OF IRON: CPT | Performed by: NURSE PRACTITIONER

## 2019-08-26 NOTE — PROGRESS NOTES
PROBLEM LIST:  1. Angiosarcoma of the left breast  A) presented with a palpable mass on August 2017.  Mammogram 8/14/2017 showed a 2 cm left upper outer quadrant mass.  Biopsy was positive for angiosarcoma.  A breast MRI showed a 4.3 cm vascular mass.  A PET CT was done which showed abnormal uptake in the pelvis and uterus.  She was evaluated by GYN oncology with an endometrial biopsy and MRI, all of which was benign.  She underwent a left mastectomy.  Lymph nodes were uninvolved.  She received adjuvant chemotherapy with adriamycin and ifosfamide and chest wall radiation.  B) Radical resection of retroperitoneal tumor per Dr. Luque 06/13/2019.   2.  Cervical dysplasia  3.  Gastroesophageal reflux disease       Subjective     CHIEF COMPLAINT: angiosarcoma of the breast      HISTORY OF PRESENT ILLNESS:   Marianne Rodriguez returns for follow-up for ongoing care of a history of angiosarcoma of the left breast.  Her prior treatment is as detailed above.  06/13/2019 she had a radical resection of retroperitoneal tumor per Dr. Luque. Pathology revealed acute and subacute inflammation with organized fat necrosis and focal areas of necrosis with apparent abscess formation. Skeletal muscle with inflammatory changes.  Negative for tumor and granuloma.    She has been well since the surgery. She has chronic back pain and is being followed by Dr. Gray.  She has neuropathy in her hands and feet and takes gabapentin for this.         Past Medical History, Past Surgical History, Social History, Family History have been reviewed and are without significant changes except as mentioned.    Review of Systems   A comprehensive 14 point review of systems was performed and was negative except as mentioned.    Medications:  The current medication list was reviewed in the EMR    ALLERGIES:  No Known Allergies    Objective      /86 Comment: RUE  Pulse 73   Temp 97.6 °F (36.4 °C) (Temporal)   Resp 16   Ht 162.6 cm  "(64\")   Wt 49 kg (108 lb)   SpO2 100% Comment: RA  BMI 18.54 kg/m²      Performance Status: 0  General: well appearing female in no acute distress  Neuro: alert and oriented  HEENT: sclera anicteric, oropharynx clear  Lymphatics: no cervical, supraclavicular, or axillary adenopathy  Cardiovascular: regular rate and rhythm, no murmurs  Lungs: clear to auscultation bilaterally  Abdomen: soft, nontender, nondistended.  No palpable organomegaly  Extremeties: no lower extremity edema  Skin: no rashes, lesions, bruising, or petechiae  Psych: mood and affect appropriate      RECENT LABS:        Tissue Pathology Exam: [id number]   Order: [id number]   Collected:  6/13/2019 14:59 Status:  Final result   Visible to patient:  No (Not Released) Dx:  Retroperitoneal mass   Component    Clinical Information    The working history is retroperitoneal mass.    Final Diagnosis   1. RETROPERITONEAL MASS, EXCISION:  Acute and subacute inflammation with organized fat necrosis and focal areas of necrosis with apparent abscess formation (see Comment).  Skeletal muscle with inflammatory changes   Negative for tumor and granuloma.  2. EXTENDED DEEP MARGIN:  Skeletal muscle and adipose tissue with subacute inflammatory changes.     PCC/mbc    Electronically signed by Jeremy Calixto MD on 6/14/2019 at 1302                 CT Abdomen Pelvis With Contrast [[id number]] (Order [id number])   Order   Status: Final result   Study Notes        Kassie Oneal on 8/22/2019 10:57 AM   F/U breast cancer, not current treatments, no current complaints  9/2015 Left mastectomy  GB removed, leap procedure  6/2019 mass removed from lower back on right side      Appointment Information     Display Notes     als os +IV ONLY STAT READ - no balance- 8/15           PACS Images      Radiology Images   Study Result     EXAMINATION: CT CHEST W CONTRAST-, CT ABDOMEN PELVIS W CONTRAST-  08/22/2019     INDICATION: C50.912-Malignant neoplasm of unspecified site of " left  female breast; follow-up breast cancer     TECHNIQUE: Multiple axial CT imaging was obtained of the chest, abdomen  and pelvis following the administration of intravenous contrast.     The radiation dose reduction device was turned on for each scan per the  ALARA (As Low as Reasonably Achievable) protocol.     COMPARISON: NONE     FINDINGS: CHEST: Thyroid is homogeneous in appearance. No mediastinal  mass or adenopathy. The cardiac chambers within normal limits. No  pericardial effusion. No bulky hilar or axillary lymphadenopathy. The  lung parenchyma reveals calcified granuloma identified posteriorly  within the right upper lobe. The remainder of the lung parenchyma is  clear. Degenerative changes seen within the spine.     ABDOMEN: The liver is homogeneous in appearance. The spleen reveals  multiple small calcification suggesting old healed granulomatous  disease. The patient is status post cholecystectomy. The pancreas is  homogeneous. There is some soft tissue thickening identified along the  right psoas muscle with small fluid collection identified stable and  unchanged when compared to the prior study. The stranding identified and  thickening extends along the right pelvic sidewall. The right psoas  muscle is indistinct in its borders. Findings are all stable and  unchanged when compared to the prior study. The gastrointestinal tract  is within normal limits. No free fluid or free air. There is no  abdominal or retroperitoneal lymphadenopathy.     PELVIS: The pelvic organs are unremarkable. The pelvic portion of the  gastrointestinal tract are within normal limits. No free fluid or free  air. No abnormal mass or fluid collections identified.     Delayed imaging reveals contrast seen in the renal collecting systems  bilaterally. There is no pelvic adenopathy. No free fluid or free air.  Again abnormal soft tissue seen along the right psoas muscle extending  along the right pelvic sidewall posteriorly.  Findings are unchanged when  compared to the prior study.     IMPRESSION:  Soft tissue thickening is seen along the right psoas muscle  with small area of enhancement seen adjacent to the right psoas muscle  which is stable. There is an indistinct border of the right psoas muscle  with some thickening along the right pelvic sidewall. Findings are all  stable and unchanged. Findings again suggesting residual or recurrent  disease which is stable. No progression of disease.     D:  08/23/2019  E:  08/23/2019        This report was finalized on 8/23/2019 5:17 PM by Dr. Alana López MD.     WBC   Date Value Ref Range Status   06/18/2019 6.21 3.40 - 10.80 10*3/mm3 Final     RBC   Date Value Ref Range Status   06/18/2019 2.93 (L) 3.77 - 5.28 10*6/mm3 Final     Hemoglobin   Date Value Ref Range Status   06/18/2019 8.6 (L) 12.0 - 15.9 g/dL Final     Hematocrit   Date Value Ref Range Status   06/18/2019 27.4 (L) 34.0 - 46.6 % Final     MCV   Date Value Ref Range Status   06/18/2019 93.5 79.0 - 97.0 fL Final     MCH   Date Value Ref Range Status   06/18/2019 29.4 26.6 - 33.0 pg Final     MCHC   Date Value Ref Range Status   06/18/2019 31.4 (L) 31.5 - 35.7 g/dL Final     RDW   Date Value Ref Range Status   06/18/2019 14.7 12.3 - 15.4 % Final     RDW-SD   Date Value Ref Range Status   06/18/2019 50.9 37.0 - 54.0 fl Final     MPV   Date Value Ref Range Status   06/18/2019 8.8 6.0 - 12.0 fL Final     Platelets   Date Value Ref Range Status   06/18/2019 459 (H) 140 - 450 10*3/mm3 Final     Neutrophil %   Date Value Ref Range Status   06/14/2019 90.1 (H) 42.7 - 76.0 % Final     Lymphocyte %   Date Value Ref Range Status   06/14/2019 4.4 (L) 19.6 - 45.3 % Final     Monocyte %   Date Value Ref Range Status   06/14/2019 4.9 (L) 5.0 - 12.0 % Final     Eosinophil %   Date Value Ref Range Status   06/14/2019 0.0 (L) 0.3 - 6.2 % Final     Basophil %   Date Value Ref Range Status   06/14/2019 0.1 0.0 - 1.5 % Final     Immature Grans  %   Date Value Ref Range Status   06/14/2019 0.5 0.0 - 0.5 % Final     Neutrophils, Absolute   Date Value Ref Range Status   06/14/2019 12.56 (H) 1.70 - 7.00 10*3/mm3 Final     Lymphocytes, Absolute   Date Value Ref Range Status   06/14/2019 0.61 (L) 0.70 - 3.10 10*3/mm3 Final     Monocytes, Absolute   Date Value Ref Range Status   06/14/2019 0.68 0.10 - 0.90 10*3/mm3 Final     Eosinophils, Absolute   Date Value Ref Range Status   06/14/2019 0.00 0.00 - 0.40 10*3/mm3 Final     Basophils, Absolute   Date Value Ref Range Status   06/14/2019 0.02 0.00 - 0.20 10*3/mm3 Final     Immature Grans, Absolute   Date Value Ref Range Status   06/14/2019 0.07 (H) 0.00 - 0.05 10*3/mm3 Final     nRBC   Date Value Ref Range Status   06/14/2019 0.0 0.0 - 0.2 /100 WBC Final             Assessment/Plan   Marianne Rodriguez is a 44 y.o. year old female  with a history of angiosarcoma of the left breast.      06/13/2019 she had a radical resection of retroperitoneal tumor per Dr. Luque. Pathology revealed acute and subacute inflammation with organized fat necrosis and focal areas of necrosis with apparent abscess formation. Skeletal muscle with inflammatory changes.  Negative for tumor and granuloma.    CT scan from 8/23/2019 showed:  Soft tissue thickening is seen along the right psoas muscle with small area of enhancement seen adjacent to the right psoas muscle which is stable. There is an indistinct border of the right psoas muscle with some thickening along the right pelvic sidewall. Findings are all stable and unchanged. Findings again suggesting residual or recurrent disease which is stable. No progression of disease.    We will plan on repeat Ct scan of chest abdomen and pelvis in three months.     Anemia. Patient reports a long history of iron deficiency anemia. She is not on oral iron. Hemoglobin was 8.6 on 6/18/2019. We will repeat labs today including ferritin and iron profile. I will notify her regarding lab results.          Follow up in 3 months with Dr. Mann.               I spent 25 minutes with the patient. I spent > 50% percent of this time counseling and discussing prognosis, diagnostic testing, evaluation, current status and management.        Shannon Rico APRN  [facility]    8/26/2019          CC:

## 2019-08-27 ENCOUNTER — TELEPHONE (OUTPATIENT)
Dept: ONCOLOGY | Facility: CLINIC | Age: 44
End: 2019-08-27

## 2019-08-27 NOTE — TELEPHONE ENCOUNTER
Reviewed lab results with patient. No concerns noted. Reviewed plan with Dr. Mann. We will see patient back in 23 month with repeat scans.

## 2019-09-13 RX ORDER — GABAPENTIN 300 MG/1
600 CAPSULE ORAL 3 TIMES DAILY
Qty: 180 CAPSULE | Refills: 3 | Status: SHIPPED | OUTPATIENT
Start: 2019-09-13 | End: 2020-01-01 | Stop reason: SDUPTHER

## 2019-11-07 ENCOUNTER — TELEPHONE (OUTPATIENT)
Dept: ONCOLOGY | Facility: CLINIC | Age: 44
End: 2019-11-07

## 2019-11-07 NOTE — TELEPHONE ENCOUNTER
CT scans ordered for November were denied by her insurance [employer]. The denial letter states she can only have Ct scans every 6 months for 2 years then yearly until year 10 for follow up. I reviewed the above information with Dr. Mann and we will plan on CT scans in February 2020. I called patient to update her. She reports she is currently inpatient at  receiving IV antibiotics for a staph infection.   She will keep her follow up appointment 12/5/2019 with Dr. Mann

## 2020-01-01 ENCOUNTER — LAB (OUTPATIENT)
Dept: LAB | Facility: HOSPITAL | Age: 45
End: 2020-01-01

## 2020-01-01 ENCOUNTER — OFFICE VISIT (OUTPATIENT)
Dept: ONCOLOGY | Facility: CLINIC | Age: 45
End: 2020-01-01

## 2020-01-01 ENCOUNTER — HOSPITAL ENCOUNTER (OUTPATIENT)
Dept: ONCOLOGY | Facility: HOSPITAL | Age: 45
Setting detail: INFUSION SERIES
Discharge: HOME | End: 2020-05-21

## 2020-01-01 ENCOUNTER — TELEPHONE (OUTPATIENT)
Dept: ONCOLOGY | Facility: CLINIC | Age: 45
End: 2020-01-01

## 2020-01-01 ENCOUNTER — DOCUMENTATION (OUTPATIENT)
Dept: NUTRITION | Facility: HOSPITAL | Age: 45
End: 2020-01-01

## 2020-01-01 ENCOUNTER — HOSPITAL ENCOUNTER (OUTPATIENT)
Dept: ONCOLOGY | Facility: HOSPITAL | Age: 45
Setting detail: INFUSION SERIES
Discharge: HOME | End: 2020-06-18

## 2020-01-01 ENCOUNTER — HOSPITAL ENCOUNTER (OUTPATIENT)
Dept: ONCOLOGY | Facility: HOSPITAL | Age: 45
Setting detail: INFUSION SERIES
Discharge: HOME | End: 2020-07-30

## 2020-01-01 ENCOUNTER — HOSPITAL ENCOUNTER (OUTPATIENT)
Dept: CT IMAGING | Facility: HOSPITAL | Age: 45
Discharge: HOME | End: 2020-11-13
Admitting: NURSE PRACTITIONER

## 2020-01-01 ENCOUNTER — HOSPITAL ENCOUNTER (OUTPATIENT)
Dept: ONCOLOGY | Facility: HOSPITAL | Age: 45
Setting detail: INFUSION SERIES
End: 2020-01-01

## 2020-01-01 ENCOUNTER — HOSPITAL ENCOUNTER (OUTPATIENT)
Dept: PET IMAGING | Facility: HOSPITAL | Age: 45
Discharge: HOME | End: 2020-05-19

## 2020-01-01 ENCOUNTER — HOSPITAL ENCOUNTER (OUTPATIENT)
Dept: ONCOLOGY | Facility: HOSPITAL | Age: 45
Setting detail: INFUSION SERIES
Discharge: HOME | End: 2020-10-15

## 2020-01-01 ENCOUNTER — HOSPITAL ENCOUNTER (OUTPATIENT)
Dept: ONCOLOGY | Facility: HOSPITAL | Age: 45
Setting detail: INFUSION SERIES
Discharge: HOME | End: 2020-06-11

## 2020-01-01 ENCOUNTER — EDUCATION (OUTPATIENT)
Dept: ONCOLOGY | Facility: HOSPITAL | Age: 45
End: 2020-01-01

## 2020-01-01 ENCOUNTER — HOSPITAL ENCOUNTER (OUTPATIENT)
Dept: CT IMAGING | Facility: HOSPITAL | Age: 45
Discharge: HOME | End: 2020-06-30
Admitting: INTERNAL MEDICINE

## 2020-01-01 ENCOUNTER — HOSPITAL ENCOUNTER (OUTPATIENT)
Dept: CARDIOLOGY | Facility: HOSPITAL | Age: 45
Discharge: HOME | End: 2020-11-20
Admitting: INTERNAL MEDICINE

## 2020-01-01 ENCOUNTER — APPOINTMENT (OUTPATIENT)
Dept: ONCOLOGY | Facility: HOSPITAL | Age: 45
End: 2020-01-01

## 2020-01-01 ENCOUNTER — HOSPITAL ENCOUNTER (OUTPATIENT)
Dept: CT IMAGING | Facility: HOSPITAL | Age: 45
Discharge: HOME | End: 2020-09-09
Admitting: INTERNAL MEDICINE

## 2020-01-01 ENCOUNTER — MDT ASSESSMENT (OUTPATIENT)
Dept: ONCOLOGY | Facility: CLINIC | Age: 45
End: 2020-01-01

## 2020-01-01 ENCOUNTER — TELEPHONE (OUTPATIENT)
Dept: ONCOLOGY | Facility: HOSPITAL | Age: 45
End: 2020-01-01

## 2020-01-01 ENCOUNTER — HOSPITAL ENCOUNTER (OUTPATIENT)
Dept: GENERAL RADIOLOGY | Facility: HOSPITAL | Age: 45
Discharge: HOME | End: 2020-06-05

## 2020-01-01 ENCOUNTER — APPOINTMENT (OUTPATIENT)
Dept: PREADMISSION TESTING | Facility: HOSPITAL | Age: 45
End: 2020-01-01

## 2020-01-01 ENCOUNTER — DOCUMENTATION (OUTPATIENT)
Dept: ONCOLOGY | Facility: CLINIC | Age: 45
End: 2020-01-01

## 2020-01-01 ENCOUNTER — HOSPITAL ENCOUNTER (OUTPATIENT)
Dept: ONCOLOGY | Facility: HOSPITAL | Age: 45
Setting detail: INFUSION SERIES
Discharge: HOME | End: 2020-12-02

## 2020-01-01 ENCOUNTER — TRANSCRIBE ORDERS (OUTPATIENT)
Dept: GENERAL RADIOLOGY | Facility: HOSPITAL | Age: 45
End: 2020-01-01

## 2020-01-01 ENCOUNTER — DOCUMENTATION (OUTPATIENT)
Dept: SOCIAL WORK | Facility: HOSPITAL | Age: 45
End: 2020-01-01

## 2020-01-01 ENCOUNTER — HOSPITAL ENCOUNTER (OUTPATIENT)
Dept: ONCOLOGY | Facility: HOSPITAL | Age: 45
Setting detail: INFUSION SERIES
Discharge: HOME | End: 2020-09-24

## 2020-01-01 ENCOUNTER — HOSPITAL ENCOUNTER (OUTPATIENT)
Dept: ONCOLOGY | Facility: HOSPITAL | Age: 45
Setting detail: INFUSION SERIES
Discharge: HOME | End: 2020-05-28

## 2020-01-01 ENCOUNTER — HOSPITAL ENCOUNTER (OUTPATIENT)
Dept: PET IMAGING | Facility: HOSPITAL | Age: 45
Discharge: HOME | End: 2020-05-19
Admitting: INTERNAL MEDICINE

## 2020-01-01 ENCOUNTER — HOSPITAL ENCOUNTER (OUTPATIENT)
Dept: ONCOLOGY | Facility: HOSPITAL | Age: 45
Setting detail: INFUSION SERIES
Discharge: HOME | End: 2020-08-27

## 2020-01-01 ENCOUNTER — HOSPITAL ENCOUNTER (OUTPATIENT)
Dept: ONCOLOGY | Facility: HOSPITAL | Age: 45
Setting detail: INFUSION SERIES
Discharge: HOME | End: 2020-07-09

## 2020-01-01 ENCOUNTER — HOSPITAL ENCOUNTER (OUTPATIENT)
Dept: ONCOLOGY | Facility: HOSPITAL | Age: 45
Setting detail: INFUSION SERIES
Discharge: HOME | End: 2020-11-05

## 2020-01-01 ENCOUNTER — HOSPITAL ENCOUNTER (OUTPATIENT)
Dept: ONCOLOGY | Facility: HOSPITAL | Age: 45
Setting detail: INFUSION SERIES
Discharge: HOME | End: 2020-08-20

## 2020-01-01 ENCOUNTER — HOSPITAL ENCOUNTER (OUTPATIENT)
Dept: ONCOLOGY | Facility: HOSPITAL | Age: 45
Setting detail: INFUSION SERIES
Discharge: HOME | End: 2020-07-02

## 2020-01-01 VITALS
RESPIRATION RATE: 18 BRPM | TEMPERATURE: 97.8 F | SYSTOLIC BLOOD PRESSURE: 146 MMHG | WEIGHT: 133 LBS | BODY MASS INDEX: 22.71 KG/M2 | OXYGEN SATURATION: 100 % | HEART RATE: 99 BPM | DIASTOLIC BLOOD PRESSURE: 63 MMHG | HEIGHT: 64 IN

## 2020-01-01 VITALS
TEMPERATURE: 96 F | BODY MASS INDEX: 22.02 KG/M2 | WEIGHT: 129 LBS | HEIGHT: 64 IN | RESPIRATION RATE: 18 BRPM | SYSTOLIC BLOOD PRESSURE: 112 MMHG | HEART RATE: 91 BPM | DIASTOLIC BLOOD PRESSURE: 70 MMHG | OXYGEN SATURATION: 98 %

## 2020-01-01 VITALS
BODY MASS INDEX: 21.85 KG/M2 | WEIGHT: 128 LBS | HEART RATE: 72 BPM | SYSTOLIC BLOOD PRESSURE: 138 MMHG | TEMPERATURE: 97.2 F | HEIGHT: 64 IN | DIASTOLIC BLOOD PRESSURE: 65 MMHG | RESPIRATION RATE: 16 BRPM

## 2020-01-01 VITALS
RESPIRATION RATE: 16 BRPM | BODY MASS INDEX: 23.56 KG/M2 | HEIGHT: 64 IN | SYSTOLIC BLOOD PRESSURE: 122 MMHG | TEMPERATURE: 97.5 F | HEART RATE: 60 BPM | DIASTOLIC BLOOD PRESSURE: 66 MMHG | OXYGEN SATURATION: 98 % | WEIGHT: 138 LBS

## 2020-01-01 VITALS
DIASTOLIC BLOOD PRESSURE: 67 MMHG | WEIGHT: 131.9 LBS | BODY MASS INDEX: 22.52 KG/M2 | RESPIRATION RATE: 18 BRPM | SYSTOLIC BLOOD PRESSURE: 150 MMHG | HEIGHT: 64 IN | TEMPERATURE: 97.1 F | HEART RATE: 87 BPM

## 2020-01-01 VITALS
HEART RATE: 109 BPM | WEIGHT: 132 LBS | RESPIRATION RATE: 20 BRPM | BODY MASS INDEX: 22.53 KG/M2 | DIASTOLIC BLOOD PRESSURE: 71 MMHG | HEIGHT: 64 IN | TEMPERATURE: 96.9 F | SYSTOLIC BLOOD PRESSURE: 126 MMHG

## 2020-01-01 VITALS
BODY MASS INDEX: 24.59 KG/M2 | OXYGEN SATURATION: 98 % | RESPIRATION RATE: 16 BRPM | WEIGHT: 144 LBS | DIASTOLIC BLOOD PRESSURE: 64 MMHG | HEIGHT: 64 IN | SYSTOLIC BLOOD PRESSURE: 120 MMHG | HEART RATE: 63 BPM | TEMPERATURE: 97.9 F

## 2020-01-01 VITALS
SYSTOLIC BLOOD PRESSURE: 134 MMHG | RESPIRATION RATE: 16 BRPM | HEART RATE: 71 BPM | DIASTOLIC BLOOD PRESSURE: 65 MMHG | BODY MASS INDEX: 23.39 KG/M2 | TEMPERATURE: 97.2 F | OXYGEN SATURATION: 98 % | WEIGHT: 137 LBS | HEIGHT: 64 IN

## 2020-01-01 VITALS
DIASTOLIC BLOOD PRESSURE: 67 MMHG | WEIGHT: 140 LBS | TEMPERATURE: 97.6 F | HEIGHT: 64 IN | RESPIRATION RATE: 18 BRPM | SYSTOLIC BLOOD PRESSURE: 137 MMHG | HEART RATE: 92 BPM | BODY MASS INDEX: 23.9 KG/M2

## 2020-01-01 VITALS
HEIGHT: 64 IN | OXYGEN SATURATION: 98 % | RESPIRATION RATE: 16 BRPM | DIASTOLIC BLOOD PRESSURE: 74 MMHG | SYSTOLIC BLOOD PRESSURE: 131 MMHG | BODY MASS INDEX: 21.85 KG/M2 | HEART RATE: 113 BPM | WEIGHT: 128 LBS | TEMPERATURE: 98.4 F

## 2020-01-01 VITALS
BODY MASS INDEX: 23.9 KG/M2 | RESPIRATION RATE: 16 BRPM | TEMPERATURE: 98 F | WEIGHT: 140 LBS | SYSTOLIC BLOOD PRESSURE: 122 MMHG | HEIGHT: 64 IN | HEART RATE: 88 BPM | DIASTOLIC BLOOD PRESSURE: 68 MMHG

## 2020-01-01 VITALS
TEMPERATURE: 97.8 F | DIASTOLIC BLOOD PRESSURE: 69 MMHG | BODY MASS INDEX: 24.59 KG/M2 | SYSTOLIC BLOOD PRESSURE: 143 MMHG | HEART RATE: 68 BPM | HEIGHT: 64 IN | WEIGHT: 144 LBS | RESPIRATION RATE: 16 BRPM

## 2020-01-01 VITALS
WEIGHT: 141 LBS | HEART RATE: 96 BPM | SYSTOLIC BLOOD PRESSURE: 137 MMHG | BODY MASS INDEX: 24.07 KG/M2 | TEMPERATURE: 96 F | RESPIRATION RATE: 18 BRPM | HEIGHT: 64 IN | DIASTOLIC BLOOD PRESSURE: 65 MMHG | OXYGEN SATURATION: 99 %

## 2020-01-01 VITALS
TEMPERATURE: 97.5 F | WEIGHT: 145 LBS | HEART RATE: 90 BPM | DIASTOLIC BLOOD PRESSURE: 76 MMHG | HEIGHT: 64 IN | RESPIRATION RATE: 16 BRPM | SYSTOLIC BLOOD PRESSURE: 139 MMHG | OXYGEN SATURATION: 99 % | BODY MASS INDEX: 24.75 KG/M2

## 2020-01-01 VITALS
BODY MASS INDEX: 24.24 KG/M2 | TEMPERATURE: 97.1 F | RESPIRATION RATE: 16 BRPM | SYSTOLIC BLOOD PRESSURE: 125 MMHG | HEIGHT: 64 IN | DIASTOLIC BLOOD PRESSURE: 62 MMHG | HEART RATE: 76 BPM | WEIGHT: 142 LBS

## 2020-01-01 DIAGNOSIS — C50.912 ANGIOSARCOMA OF LEFT FEMALE BREAST (HCC): Primary | ICD-10-CM

## 2020-01-01 DIAGNOSIS — C50.912 ANGIOSARCOMA OF LEFT FEMALE BREAST (HCC): ICD-10-CM

## 2020-01-01 DIAGNOSIS — Z45.2 ENCOUNTER FOR CENTRAL LINE CARE: ICD-10-CM

## 2020-01-01 DIAGNOSIS — K43.9 VENTRAL HERNIA WITHOUT OBSTRUCTION OR GANGRENE: ICD-10-CM

## 2020-01-01 DIAGNOSIS — C50.912 MALIGNANT NEOPLASM OF LEFT FEMALE BREAST, UNSPECIFIED ESTROGEN RECEPTOR STATUS, UNSPECIFIED SITE OF BREAST (HCC): ICD-10-CM

## 2020-01-01 DIAGNOSIS — B37.9 YEAST INFECTION: ICD-10-CM

## 2020-01-01 DIAGNOSIS — C49.9 ANGIOSARCOMA (HCC): ICD-10-CM

## 2020-01-01 DIAGNOSIS — K13.79 MOUTH SORES: ICD-10-CM

## 2020-01-01 DIAGNOSIS — I42.7 CHEMOTHERAPY INDUCED CARDIOMYOPATHY (HCC): ICD-10-CM

## 2020-01-01 DIAGNOSIS — K13.79 MOUTH SORES: Primary | ICD-10-CM

## 2020-01-01 DIAGNOSIS — C50.912 MALIGNANT NEOPLASM OF LEFT FEMALE BREAST, UNSPECIFIED ESTROGEN RECEPTOR STATUS, UNSPECIFIED SITE OF BREAST (HCC): Primary | ICD-10-CM

## 2020-01-01 DIAGNOSIS — T45.1X5A CHEMOTHERAPY INDUCED CARDIOMYOPATHY (HCC): ICD-10-CM

## 2020-01-01 DIAGNOSIS — Z45.2 ENCOUNTER FOR CENTRAL LINE CARE: Primary | ICD-10-CM

## 2020-01-01 LAB
ALBUMIN SERPL-MCNC: 3.3 G/DL (ref 3.5–5.2)
ALBUMIN SERPL-MCNC: 3.6 G/DL (ref 3.5–5.2)
ALBUMIN SERPL-MCNC: 3.8 G/DL (ref 3.5–5.2)
ALBUMIN SERPL-MCNC: 3.9 G/DL (ref 3.5–5.2)
ALBUMIN SERPL-MCNC: 4 G/DL (ref 3.5–5.2)
ALBUMIN SERPL-MCNC: 4 G/DL (ref 3.5–5.2)
ALBUMIN SERPL-MCNC: 4.2 G/DL (ref 3.5–5.2)
ALBUMIN SERPL-MCNC: 4.2 G/DL (ref 3.5–5.2)
ALBUMIN SERPL-MCNC: 4.4 G/DL (ref 3.5–5.2)
ALBUMIN SERPL-MCNC: 4.4 G/DL (ref 3.5–5.2)
ALBUMIN SERPL-MCNC: 4.5 G/DL (ref 3.5–5.2)
ALBUMIN/GLOB SERPL: 1.2 G/DL
ALBUMIN/GLOB SERPL: 1.3 G/DL
ALBUMIN/GLOB SERPL: 1.3 G/DL
ALBUMIN/GLOB SERPL: 1.4 G/DL
ALBUMIN/GLOB SERPL: 1.5 G/DL
ALBUMIN/GLOB SERPL: 1.6 G/DL
ALBUMIN/GLOB SERPL: 1.7 G/DL
ALBUMIN/GLOB SERPL: 1.7 G/DL
ALBUMIN/GLOB SERPL: 1.8 G/DL
ALP SERPL-CCNC: 103 U/L (ref 39–117)
ALP SERPL-CCNC: 104 U/L (ref 39–117)
ALP SERPL-CCNC: 114 U/L (ref 39–117)
ALP SERPL-CCNC: 133 U/L (ref 39–117)
ALP SERPL-CCNC: 146 U/L (ref 39–117)
ALP SERPL-CCNC: 71 U/L (ref 39–117)
ALP SERPL-CCNC: 72 U/L (ref 39–117)
ALP SERPL-CCNC: 76 U/L (ref 39–117)
ALP SERPL-CCNC: 79 U/L (ref 39–117)
ALP SERPL-CCNC: 85 U/L (ref 39–117)
ALP SERPL-CCNC: 91 U/L (ref 39–117)
ALP SERPL-CCNC: 93 U/L (ref 39–117)
ALP SERPL-CCNC: 96 U/L (ref 39–117)
ALT SERPL W P-5'-P-CCNC: 12 U/L (ref 1–33)
ALT SERPL W P-5'-P-CCNC: 148 U/L (ref 1–33)
ALT SERPL W P-5'-P-CCNC: 21 U/L (ref 1–33)
ALT SERPL W P-5'-P-CCNC: 21 U/L (ref 1–33)
ALT SERPL W P-5'-P-CCNC: 22 U/L (ref 1–33)
ALT SERPL W P-5'-P-CCNC: 23 U/L (ref 1–33)
ALT SERPL W P-5'-P-CCNC: 24 U/L (ref 1–33)
ALT SERPL W P-5'-P-CCNC: 25 U/L (ref 1–33)
ALT SERPL W P-5'-P-CCNC: 28 U/L (ref 1–33)
ALT SERPL W P-5'-P-CCNC: 30 U/L (ref 1–33)
ALT SERPL W P-5'-P-CCNC: 34 U/L (ref 1–33)
ALT SERPL W P-5'-P-CCNC: 49 U/L (ref 1–33)
ALT SERPL W P-5'-P-CCNC: 60 U/L (ref 1–33)
AMMONIA BLD-SCNC: 30 UMOL/L (ref 11–51)
AMPHET+METHAMPHET UR QL: NEGATIVE
AMPHET+METHAMPHET UR QL: NEGATIVE
AMPHET+METHAMPHET UR QL: POSITIVE
AMPHET+METHAMPHET UR QL: POSITIVE
AMPHETAMINES UR QL: NEGATIVE
AMPHETAMINES UR QL: POSITIVE
ANION GAP SERPL CALCULATED.3IONS-SCNC: 10 MMOL/L (ref 5–15)
ANION GAP SERPL CALCULATED.3IONS-SCNC: 10 MMOL/L (ref 5–15)
ANION GAP SERPL CALCULATED.3IONS-SCNC: 12 MMOL/L (ref 5–15)
ANION GAP SERPL CALCULATED.3IONS-SCNC: 13 MMOL/L (ref 5–15)
ANION GAP SERPL CALCULATED.3IONS-SCNC: 6 MMOL/L (ref 5–15)
ANION GAP SERPL CALCULATED.3IONS-SCNC: 7 MMOL/L (ref 5–15)
ANION GAP SERPL CALCULATED.3IONS-SCNC: 8 MMOL/L (ref 5–15)
ANION GAP SERPL CALCULATED.3IONS-SCNC: 9 MMOL/L (ref 5–15)
AST SERPL-CCNC: 15 U/L (ref 1–32)
AST SERPL-CCNC: 16 U/L (ref 1–32)
AST SERPL-CCNC: 20 U/L (ref 1–32)
AST SERPL-CCNC: 21 U/L (ref 1–32)
AST SERPL-CCNC: 21 U/L (ref 1–32)
AST SERPL-CCNC: 22 U/L (ref 1–32)
AST SERPL-CCNC: 22 U/L (ref 1–32)
AST SERPL-CCNC: 23 U/L (ref 1–32)
AST SERPL-CCNC: 24 U/L (ref 1–32)
AST SERPL-CCNC: 32 U/L (ref 1–32)
AST SERPL-CCNC: 59 U/L (ref 1–32)
AST SERPL-CCNC: 62 U/L (ref 1–32)
AST SERPL-CCNC: 80 U/L (ref 1–32)
BARBITURATES UR QL SCN: NEGATIVE
BENZODIAZ UR QL SCN: NEGATIVE
BH CV ECHO MEAS - AO MAX PG (FULL): 4.7 MMHG
BH CV ECHO MEAS - AO MAX PG: 8.2 MMHG
BH CV ECHO MEAS - AO MEAN PG (FULL): 2 MMHG
BH CV ECHO MEAS - AO MEAN PG: 4 MMHG
BH CV ECHO MEAS - AO ROOT AREA (BSA CORRECTED): 1.7
BH CV ECHO MEAS - AO ROOT AREA: 7.5 CM^2
BH CV ECHO MEAS - AO ROOT DIAM: 3.1 CM
BH CV ECHO MEAS - AO V2 MAX: 143 CM/SEC
BH CV ECHO MEAS - AO V2 MEAN: 97.7 CM/SEC
BH CV ECHO MEAS - AO V2 VTI: 29.9 CM
BH CV ECHO MEAS - ASC AORTA: 2.8 CM
BH CV ECHO MEAS - AVA(I,A): 2 CM^2
BH CV ECHO MEAS - AVA(I,D): 2 CM^2
BH CV ECHO MEAS - AVA(V,A): 2 CM^2
BH CV ECHO MEAS - AVA(V,D): 2 CM^2
BH CV ECHO MEAS - BSA(HAYCOCK): 1.9 M^2
BH CV ECHO MEAS - BSA: 1.8 M^2
BH CV ECHO MEAS - BZI_BMI: 30 KILOGRAMS/M^2
BH CV ECHO MEAS - BZI_METRIC_HEIGHT: 162.6 CM
BH CV ECHO MEAS - BZI_METRIC_WEIGHT: 79.4 KG
BH CV ECHO MEAS - EDV(CUBED): 78.4 ML
BH CV ECHO MEAS - EDV(MOD-SP2): 77 ML
BH CV ECHO MEAS - EDV(MOD-SP4): 78 ML
BH CV ECHO MEAS - EDV(TEICH): 82.2 ML
BH CV ECHO MEAS - EF(CUBED): 61.6 %
BH CV ECHO MEAS - EF(MOD-BP): 61 %
BH CV ECHO MEAS - EF(MOD-SP2): 61 %
BH CV ECHO MEAS - EF(MOD-SP4): 62.8 %
BH CV ECHO MEAS - EF(TEICH): 53.5 %
BH CV ECHO MEAS - ESV(CUBED): 30.1 ML
BH CV ECHO MEAS - ESV(MOD-SP2): 30 ML
BH CV ECHO MEAS - ESV(MOD-SP4): 29 ML
BH CV ECHO MEAS - ESV(TEICH): 38.2 ML
BH CV ECHO MEAS - FS: 27.3 %
BH CV ECHO MEAS - IVS/LVPW: 0.96
BH CV ECHO MEAS - IVSD: 0.83 CM
BH CV ECHO MEAS - LA DIMENSION: 3.3 CM
BH CV ECHO MEAS - LA/AO: 1.1
BH CV ECHO MEAS - LAD MAJOR: 4.5 CM
BH CV ECHO MEAS - LAT PEAK E' VEL: 9 CM/SEC
BH CV ECHO MEAS - LATERAL E/E' RATIO: 11.2
BH CV ECHO MEAS - LV DIASTOLIC VOL/BSA (35-75): 42.2 ML/M^2
BH CV ECHO MEAS - LV MASS(C)D: 112.2 GRAMS
BH CV ECHO MEAS - LV MASS(C)DI: 60.7 GRAMS/M^2
BH CV ECHO MEAS - LV MAX PG: 3.5 MMHG
BH CV ECHO MEAS - LV MEAN PG: 2 MMHG
BH CV ECHO MEAS - LV SYSTOLIC VOL/BSA (12-30): 15.7 ML/M^2
BH CV ECHO MEAS - LV V1 MAX: 93.2 CM/SEC
BH CV ECHO MEAS - LV V1 MEAN: 57.2 CM/SEC
BH CV ECHO MEAS - LV V1 VTI: 19 CM
BH CV ECHO MEAS - LVIDD: 4.3 CM
BH CV ECHO MEAS - LVIDS: 3.1 CM
BH CV ECHO MEAS - LVLD AP2: 6.6 CM
BH CV ECHO MEAS - LVLD AP4: 6.7 CM
BH CV ECHO MEAS - LVLS AP2: 4.7 CM
BH CV ECHO MEAS - LVLS AP4: 5 CM
BH CV ECHO MEAS - LVOT AREA (M): 3.1 CM^2
BH CV ECHO MEAS - LVOT AREA: 3.1 CM^2
BH CV ECHO MEAS - LVOT DIAM: 2 CM
BH CV ECHO MEAS - LVPWD: 0.86 CM
BH CV ECHO MEAS - MED PEAK E' VEL: 8 CM/SEC
BH CV ECHO MEAS - MEDIAL E/E' RATIO: 12.7
BH CV ECHO MEAS - MV A MAX VEL: 81.4 CM/SEC
BH CV ECHO MEAS - MV DEC TIME: 0.18 SEC
BH CV ECHO MEAS - MV E MAX VEL: 101 CM/SEC
BH CV ECHO MEAS - MV E/A: 1.2
BH CV ECHO MEAS - PA ACC SLOPE: 869 CM/SEC^2
BH CV ECHO MEAS - PA ACC TIME: 0.13 SEC
BH CV ECHO MEAS - PA PR(ACCEL): 21 MMHG
BH CV ECHO MEAS - PI END-D VEL: 186 CM/SEC
BH CV ECHO MEAS - RAP SYSTOLE: 3 MMHG
BH CV ECHO MEAS - RVSP: 29 MMHG
BH CV ECHO MEAS - SI(AO): 122.1 ML/M^2
BH CV ECHO MEAS - SI(CUBED): 26.1 ML/M^2
BH CV ECHO MEAS - SI(LVOT): 32.3 ML/M^2
BH CV ECHO MEAS - SI(MOD-SP2): 25.4 ML/M^2
BH CV ECHO MEAS - SI(MOD-SP4): 26.5 ML/M^2
BH CV ECHO MEAS - SI(TEICH): 23.8 ML/M^2
BH CV ECHO MEAS - SV(AO): 225.7 ML
BH CV ECHO MEAS - SV(CUBED): 48.3 ML
BH CV ECHO MEAS - SV(LVOT): 59.7 ML
BH CV ECHO MEAS - SV(MOD-SP2): 47 ML
BH CV ECHO MEAS - SV(MOD-SP4): 49 ML
BH CV ECHO MEAS - SV(TEICH): 43.9 ML
BH CV ECHO MEAS - TAPSE (>1.6): 2.3 CM
BH CV ECHO MEAS - TR MAX PG: 26 MMHG
BH CV ECHO MEAS - TR MAX VEL: 252.5 CM/SEC
BH CV ECHO MEASUREMENTS AVERAGE E/E' RATIO: 11.88
BH CV XLRA - RV BASE: 3.1 CM
BH CV XLRA - RV LENGTH: 4.7 CM
BH CV XLRA - RV MID: 3 CM
BILIRUB SERPL-MCNC: 0.2 MG/DL (ref 0.2–1.2)
BILIRUB SERPL-MCNC: 0.2 MG/DL (ref 0–1.2)
BILIRUB SERPL-MCNC: 0.3 MG/DL (ref 0.2–1.2)
BILIRUB SERPL-MCNC: 0.3 MG/DL (ref 0–1.2)
BILIRUB SERPL-MCNC: 0.3 MG/DL (ref 0–1.2)
BILIRUB SERPL-MCNC: 0.6 MG/DL (ref 0–1.2)
BILIRUB SERPL-MCNC: 0.6 MG/DL (ref 0–1.2)
BILIRUB SERPL-MCNC: <0.2 MG/DL (ref 0.2–1.2)
BUN BLD-MCNC: 10 MG/DL (ref 6–20)
BUN BLD-MCNC: 11 MG/DL (ref 6–20)
BUN BLD-MCNC: 14 MG/DL (ref 6–20)
BUN BLD-MCNC: 8 MG/DL (ref 6–20)
BUN BLD-MCNC: 9 MG/DL (ref 6–20)
BUN SERPL-MCNC: 11 MG/DL (ref 6–20)
BUN SERPL-MCNC: 11 MG/DL (ref 6–20)
BUN SERPL-MCNC: 14 MG/DL (ref 6–20)
BUN SERPL-MCNC: 15 MG/DL (ref 6–20)
BUN SERPL-MCNC: 16 MG/DL (ref 6–20)
BUN SERPL-MCNC: 16 MG/DL (ref 6–20)
BUN SERPL-MCNC: 24 MG/DL (ref 6–20)
BUN SERPL-MCNC: 9 MG/DL (ref 6–20)
BUN/CREAT SERPL: 11.9 (ref 7–25)
BUN/CREAT SERPL: 13.6 (ref 7–25)
BUN/CREAT SERPL: 13.9 (ref 7–25)
BUN/CREAT SERPL: 15.3 (ref 7–25)
BUN/CREAT SERPL: 15.3 (ref 7–25)
BUN/CREAT SERPL: 16.7 (ref 7–25)
BUN/CREAT SERPL: 19.3 (ref 7–25)
BUN/CREAT SERPL: 19.3 (ref 7–25)
BUN/CREAT SERPL: 22.2 (ref 7–25)
BUN/CREAT SERPL: 23.9 (ref 7–25)
BUN/CREAT SERPL: 25.5 (ref 7–25)
BUN/CREAT SERPL: 6.9 (ref 7–25)
BUN/CREAT SERPL: 9.1 (ref 7–25)
BUPRENORPHINE SERPL-MCNC: NEGATIVE NG/ML
CALCIUM SPEC-SCNC: 8 MG/DL (ref 8.6–10.5)
CALCIUM SPEC-SCNC: 8.4 MG/DL (ref 8.6–10.5)
CALCIUM SPEC-SCNC: 8.4 MG/DL (ref 8.6–10.5)
CALCIUM SPEC-SCNC: 8.5 MG/DL (ref 8.6–10.5)
CALCIUM SPEC-SCNC: 8.6 MG/DL (ref 8.6–10.5)
CALCIUM SPEC-SCNC: 9 MG/DL (ref 8.6–10.5)
CALCIUM SPEC-SCNC: 9.1 MG/DL (ref 8.6–10.5)
CALCIUM SPEC-SCNC: 9.3 MG/DL (ref 8.6–10.5)
CALCIUM SPEC-SCNC: 9.3 MG/DL (ref 8.6–10.5)
CALCIUM SPEC-SCNC: 9.5 MG/DL (ref 8.6–10.5)
CALCIUM SPEC-SCNC: 9.9 MG/DL (ref 8.6–10.5)
CANNABINOIDS SERPL QL: NEGATIVE
CHLORIDE SERPL-SCNC: 100 MMOL/L (ref 98–107)
CHLORIDE SERPL-SCNC: 102 MMOL/L (ref 98–107)
CHLORIDE SERPL-SCNC: 103 MMOL/L (ref 98–107)
CHLORIDE SERPL-SCNC: 105 MMOL/L (ref 98–107)
CHLORIDE SERPL-SCNC: 105 MMOL/L (ref 98–107)
CHLORIDE SERPL-SCNC: 106 MMOL/L (ref 98–107)
CHLORIDE SERPL-SCNC: 107 MMOL/L (ref 98–107)
CHLORIDE SERPL-SCNC: 107 MMOL/L (ref 98–107)
CO2 SERPL-SCNC: 22 MMOL/L (ref 22–29)
CO2 SERPL-SCNC: 23 MMOL/L (ref 22–29)
CO2 SERPL-SCNC: 23 MMOL/L (ref 22–29)
CO2 SERPL-SCNC: 24 MMOL/L (ref 22–29)
CO2 SERPL-SCNC: 25 MMOL/L (ref 22–29)
CO2 SERPL-SCNC: 26 MMOL/L (ref 22–29)
CO2 SERPL-SCNC: 26 MMOL/L (ref 22–29)
CO2 SERPL-SCNC: 27 MMOL/L (ref 22–29)
CO2 SERPL-SCNC: 28 MMOL/L (ref 22–29)
CO2 SERPL-SCNC: 29 MMOL/L (ref 22–29)
CO2 SERPL-SCNC: 29 MMOL/L (ref 22–29)
COCAINE UR QL: NEGATIVE
CREAT BLD-MCNC: 0.55 MG/DL (ref 0.57–1)
CREAT BLD-MCNC: 0.57 MG/DL (ref 0.57–1)
CREAT BLD-MCNC: 0.66 MG/DL (ref 0.57–1)
CREAT BLD-MCNC: 0.67 MG/DL (ref 0.57–1)
CREAT BLD-MCNC: 0.72 MG/DL (ref 0.57–1)
CREAT BLDA-MCNC: 0.7 MG/DL (ref 0.6–1.3)
CREAT SERPL-MCNC: 0.57 MG/DL (ref 0.57–1)
CREAT SERPL-MCNC: 0.59 MG/DL (ref 0.57–1)
CREAT SERPL-MCNC: 0.63 MG/DL (ref 0.57–1)
CREAT SERPL-MCNC: 0.67 MG/DL (ref 0.57–1)
CREAT SERPL-MCNC: 0.72 MG/DL (ref 0.57–1)
CREAT SERPL-MCNC: 0.9 MG/DL (ref 0.57–1)
CREAT SERPL-MCNC: 2.31 MG/DL (ref 0.57–1)
CREAT SERPL-MCNC: 2.63 MG/DL (ref 0.57–1)
ERYTHROCYTE [DISTWIDTH] IN BLOOD BY AUTOMATED COUNT: 14 % (ref 12.3–15.4)
ERYTHROCYTE [DISTWIDTH] IN BLOOD BY AUTOMATED COUNT: 14.1 % (ref 12.3–15.4)
ERYTHROCYTE [DISTWIDTH] IN BLOOD BY AUTOMATED COUNT: 14.9 % (ref 12.3–15.4)
ERYTHROCYTE [DISTWIDTH] IN BLOOD BY AUTOMATED COUNT: 16.6 % (ref 12.3–15.4)
ERYTHROCYTE [DISTWIDTH] IN BLOOD BY AUTOMATED COUNT: 16.7 % (ref 12.3–15.4)
ERYTHROCYTE [DISTWIDTH] IN BLOOD BY AUTOMATED COUNT: 17.6 % (ref 12.3–15.4)
ERYTHROCYTE [DISTWIDTH] IN BLOOD BY AUTOMATED COUNT: 18.1 % (ref 12.3–15.4)
ERYTHROCYTE [DISTWIDTH] IN BLOOD BY AUTOMATED COUNT: 18.4 % (ref 12.3–15.4)
ERYTHROCYTE [DISTWIDTH] IN BLOOD BY AUTOMATED COUNT: 18.6 % (ref 12.3–15.4)
ERYTHROCYTE [DISTWIDTH] IN BLOOD BY AUTOMATED COUNT: 18.8 % (ref 12.3–15.4)
ERYTHROCYTE [DISTWIDTH] IN BLOOD BY AUTOMATED COUNT: 21.6 % (ref 12.3–15.4)
ERYTHROCYTE [DISTWIDTH] IN BLOOD BY AUTOMATED COUNT: 21.9 % (ref 12.3–15.4)
ERYTHROCYTE [DISTWIDTH] IN BLOOD BY AUTOMATED COUNT: 22.4 % (ref 12.3–15.4)
ETHANOL BLD-MCNC: <10 MG/DL (ref 0–10)
GFR SERPL CREATININE-BSD FRML MDRD: 103 ML/MIN/1.73
GFR SERPL CREATININE-BSD FRML MDRD: 111 ML/MIN/1.73
GFR SERPL CREATININE-BSD FRML MDRD: 116 ML/MIN/1.73
GFR SERPL CREATININE-BSD FRML MDRD: 116 ML/MIN/1.73
GFR SERPL CREATININE-BSD FRML MDRD: 121 ML/MIN/1.73
GFR SERPL CREATININE-BSD FRML MDRD: 20 ML/MIN/1.73
GFR SERPL CREATININE-BSD FRML MDRD: 23 ML/MIN/1.73
GFR SERPL CREATININE-BSD FRML MDRD: 68 ML/MIN/1.73
GFR SERPL CREATININE-BSD FRML MDRD: 88 ML/MIN/1.73
GFR SERPL CREATININE-BSD FRML MDRD: 88 ML/MIN/1.73
GFR SERPL CREATININE-BSD FRML MDRD: 96 ML/MIN/1.73
GFR SERPL CREATININE-BSD FRML MDRD: 97 ML/MIN/1.73
GFR SERPL CREATININE-BSD FRML MDRD: 98 ML/MIN/1.73
GLOBULIN UR ELPH-MCNC: 2.2 GM/DL
GLOBULIN UR ELPH-MCNC: 2.3 GM/DL
GLOBULIN UR ELPH-MCNC: 2.4 GM/DL
GLOBULIN UR ELPH-MCNC: 2.4 GM/DL
GLOBULIN UR ELPH-MCNC: 2.5 GM/DL
GLOBULIN UR ELPH-MCNC: 2.5 GM/DL
GLOBULIN UR ELPH-MCNC: 2.6 GM/DL
GLOBULIN UR ELPH-MCNC: 2.6 GM/DL
GLOBULIN UR ELPH-MCNC: 2.8 GM/DL
GLOBULIN UR ELPH-MCNC: 3.2 GM/DL
GLOBULIN UR ELPH-MCNC: 3.4 GM/DL
GLUCOSE BLD-MCNC: 104 MG/DL (ref 65–99)
GLUCOSE BLD-MCNC: 118 MG/DL (ref 65–99)
GLUCOSE BLD-MCNC: 120 MG/DL (ref 65–99)
GLUCOSE BLD-MCNC: 82 MG/DL (ref 65–99)
GLUCOSE BLD-MCNC: 91 MG/DL (ref 65–99)
GLUCOSE BLDC GLUCOMTR-MCNC: 94 MG/DL (ref 70–130)
GLUCOSE SERPL-MCNC: 109 MG/DL (ref 65–99)
GLUCOSE SERPL-MCNC: 110 MG/DL (ref 65–99)
GLUCOSE SERPL-MCNC: 116 MG/DL (ref 65–99)
GLUCOSE SERPL-MCNC: 121 MG/DL (ref 65–99)
GLUCOSE SERPL-MCNC: 139 MG/DL (ref 65–99)
GLUCOSE SERPL-MCNC: 75 MG/DL (ref 65–99)
GLUCOSE SERPL-MCNC: 86 MG/DL (ref 65–99)
GLUCOSE SERPL-MCNC: 89 MG/DL (ref 65–99)
HCT VFR BLD AUTO: 31.1 % (ref 34–46.6)
HCT VFR BLD AUTO: 31.6 % (ref 34–46.6)
HCT VFR BLD AUTO: 32.3 % (ref 34–46.6)
HCT VFR BLD AUTO: 32.3 % (ref 34–46.6)
HCT VFR BLD AUTO: 32.4 % (ref 34–46.6)
HCT VFR BLD AUTO: 33.9 % (ref 34–46.6)
HCT VFR BLD AUTO: 34.4 % (ref 34–46.6)
HCT VFR BLD AUTO: 34.8 % (ref 34–46.6)
HCT VFR BLD AUTO: 35.9 % (ref 34–46.6)
HCT VFR BLD AUTO: 37.1 % (ref 34–46.6)
HCT VFR BLD AUTO: 38.1 % (ref 34–46.6)
HCT VFR BLD AUTO: 39.7 % (ref 34–46.6)
HCT VFR BLD AUTO: 41.4 % (ref 34–46.6)
HGB BLD-MCNC: 10 G/DL (ref 12–15.9)
HGB BLD-MCNC: 10 G/DL (ref 12–15.9)
HGB BLD-MCNC: 10.3 G/DL (ref 12–15.9)
HGB BLD-MCNC: 10.9 G/DL (ref 12–15.9)
HGB BLD-MCNC: 11 G/DL (ref 12–15.9)
HGB BLD-MCNC: 11.1 G/DL (ref 12–15.9)
HGB BLD-MCNC: 11.5 G/DL (ref 12–15.9)
HGB BLD-MCNC: 11.6 G/DL (ref 12–15.9)
HGB BLD-MCNC: 11.9 G/DL (ref 12–15.9)
HGB BLD-MCNC: 12.7 G/DL (ref 12–15.9)
HGB BLD-MCNC: 13.2 G/DL (ref 12–15.9)
HGB BLD-MCNC: 9.8 G/DL (ref 12–15.9)
HGB BLD-MCNC: 9.9 G/DL (ref 12–15.9)
LEFT ATRIUM VOLUME INDEX: 20 ML/M^2
LEFT ATRIUM VOLUME: 37 ML
LV EF 2D ECHO EST: 62 %
LYMPHOCYTES # BLD AUTO: 0.8 10*3/MM3 (ref 0.7–3.1)
LYMPHOCYTES # BLD AUTO: 0.9 10*3/MM3 (ref 0.7–3.1)
LYMPHOCYTES # BLD AUTO: 1.4 10*3/MM3 (ref 0.7–3.1)
LYMPHOCYTES # BLD AUTO: 1.4 10*3/MM3 (ref 0.7–3.1)
LYMPHOCYTES # BLD AUTO: 1.7 10*3/MM3 (ref 0.7–3.1)
LYMPHOCYTES # BLD AUTO: 1.8 10*3/MM3 (ref 0.7–3.1)
LYMPHOCYTES # BLD AUTO: 1.9 10*3/MM3 (ref 0.7–3.1)
LYMPHOCYTES # BLD AUTO: 2 10*3/MM3 (ref 0.7–3.1)
LYMPHOCYTES # BLD AUTO: 2.2 10*3/MM3 (ref 0.7–3.1)
LYMPHOCYTES # BLD AUTO: 2.7 10*3/MM3 (ref 0.7–3.1)
LYMPHOCYTES # BLD AUTO: 2.8 10*3/MM3 (ref 0.7–3.1)
LYMPHOCYTES # BLD AUTO: 2.9 10*3/MM3 (ref 0.7–3.1)
LYMPHOCYTES # BLD AUTO: 3.1 10*3/MM3 (ref 0.7–3.1)
LYMPHOCYTES NFR BLD AUTO: 15.2 % (ref 19.6–45.3)
LYMPHOCYTES NFR BLD AUTO: 17.6 % (ref 19.6–45.3)
LYMPHOCYTES NFR BLD AUTO: 22.1 % (ref 19.6–45.3)
LYMPHOCYTES NFR BLD AUTO: 22.4 % (ref 19.6–45.3)
LYMPHOCYTES NFR BLD AUTO: 22.7 % (ref 19.6–45.3)
LYMPHOCYTES NFR BLD AUTO: 23 % (ref 19.6–45.3)
LYMPHOCYTES NFR BLD AUTO: 30.6 % (ref 19.6–45.3)
LYMPHOCYTES NFR BLD AUTO: 31.2 % (ref 19.6–45.3)
LYMPHOCYTES NFR BLD AUTO: 42.2 % (ref 19.6–45.3)
LYMPHOCYTES NFR BLD AUTO: 42.4 % (ref 19.6–45.3)
LYMPHOCYTES NFR BLD AUTO: 44.9 % (ref 19.6–45.3)
LYMPHOCYTES NFR BLD AUTO: 63.7 % (ref 19.6–45.3)
LYMPHOCYTES NFR BLD AUTO: 9.8 % (ref 19.6–45.3)
MCH RBC QN AUTO: 29.5 PG (ref 26.6–33)
MCH RBC QN AUTO: 29.6 PG (ref 26.6–33)
MCH RBC QN AUTO: 29.6 PG (ref 26.6–33)
MCH RBC QN AUTO: 29.8 PG (ref 26.6–33)
MCH RBC QN AUTO: 29.8 PG (ref 26.6–33)
MCH RBC QN AUTO: 30.5 PG (ref 26.6–33)
MCH RBC QN AUTO: 30.7 PG (ref 26.6–33)
MCH RBC QN AUTO: 30.8 PG (ref 26.6–33)
MCH RBC QN AUTO: 31.4 PG (ref 26.6–33)
MCH RBC QN AUTO: 32 PG (ref 26.6–33)
MCH RBC QN AUTO: 32.2 PG (ref 26.6–33)
MCH RBC QN AUTO: 32.4 PG (ref 26.6–33)
MCH RBC QN AUTO: 32.8 PG (ref 26.6–33)
MCHC RBC AUTO-ENTMCNC: 30.8 G/DL (ref 31.5–35.7)
MCHC RBC AUTO-ENTMCNC: 31 G/DL (ref 31.5–35.7)
MCHC RBC AUTO-ENTMCNC: 31 G/DL (ref 31.5–35.7)
MCHC RBC AUTO-ENTMCNC: 31.1 G/DL (ref 31.5–35.7)
MCHC RBC AUTO-ENTMCNC: 31.6 G/DL (ref 31.5–35.7)
MCHC RBC AUTO-ENTMCNC: 31.7 G/DL (ref 31.5–35.7)
MCHC RBC AUTO-ENTMCNC: 31.8 G/DL (ref 31.5–35.7)
MCHC RBC AUTO-ENTMCNC: 31.8 G/DL (ref 31.5–35.7)
MCHC RBC AUTO-ENTMCNC: 31.9 G/DL (ref 31.5–35.7)
MCHC RBC AUTO-ENTMCNC: 32.3 G/DL (ref 31.5–35.7)
MCHC RBC AUTO-ENTMCNC: 32.4 G/DL (ref 31.5–35.7)
MCV RBC AUTO: 100.2 FL (ref 79–97)
MCV RBC AUTO: 101.1 FL (ref 79–97)
MCV RBC AUTO: 102.1 FL (ref 79–97)
MCV RBC AUTO: 93.6 FL (ref 79–97)
MCV RBC AUTO: 94.4 FL (ref 79–97)
MCV RBC AUTO: 94.9 FL (ref 79–97)
MCV RBC AUTO: 95.6 FL (ref 79–97)
MCV RBC AUTO: 96.2 FL (ref 79–97)
MCV RBC AUTO: 98.6 FL (ref 79–97)
MCV RBC AUTO: 99.4 FL (ref 79–97)
MCV RBC AUTO: 99.9 FL (ref 79–97)
METHADONE UR QL SCN: NEGATIVE
MONOCYTES # BLD AUTO: 0.2 10*3/MM3 (ref 0.1–0.9)
MONOCYTES # BLD AUTO: 0.3 10*3/MM3 (ref 0.1–0.9)
MONOCYTES # BLD AUTO: 0.3 10*3/MM3 (ref 0.1–0.9)
MONOCYTES # BLD AUTO: 0.4 10*3/MM3 (ref 0.1–0.9)
MONOCYTES # BLD AUTO: 0.4 10*3/MM3 (ref 0.1–0.9)
MONOCYTES # BLD AUTO: 0.6 10*3/MM3 (ref 0.1–0.9)
MONOCYTES # BLD AUTO: 0.7 10*3/MM3 (ref 0.1–0.9)
MONOCYTES # BLD AUTO: 0.8 10*3/MM3 (ref 0.1–0.9)
MONOCYTES # BLD AUTO: 0.8 10*3/MM3 (ref 0.1–0.9)
MONOCYTES NFR BLD AUTO: 11.9 % (ref 5–12)
MONOCYTES NFR BLD AUTO: 12.8 % (ref 5–12)
MONOCYTES NFR BLD AUTO: 13.6 % (ref 5–12)
MONOCYTES NFR BLD AUTO: 13.6 % (ref 5–12)
MONOCYTES NFR BLD AUTO: 13.8 % (ref 5–12)
MONOCYTES NFR BLD AUTO: 3 % (ref 5–12)
MONOCYTES NFR BLD AUTO: 3.6 % (ref 5–12)
MONOCYTES NFR BLD AUTO: 4.3 % (ref 5–12)
MONOCYTES NFR BLD AUTO: 4.3 % (ref 5–12)
MONOCYTES NFR BLD AUTO: 4.6 % (ref 5–12)
MONOCYTES NFR BLD AUTO: 6.2 % (ref 5–12)
MONOCYTES NFR BLD AUTO: 6.3 % (ref 5–12)
MONOCYTES NFR BLD AUTO: 7.6 % (ref 5–12)
NEUTROPHILS # BLD AUTO: 1.7 10*3/MM3 (ref 1.7–7)
NEUTROPHILS # BLD AUTO: 3.2 10*3/MM3 (ref 1.7–7)
NEUTROPHILS # BLD AUTO: 4.7 10*3/MM3 (ref 1.7–7)
NEUTROPHILS # BLD AUTO: 5.4 10*3/MM3 (ref 1.7–7)
NEUTROPHILS # BLD AUTO: 5.8 10*3/MM3 (ref 1.7–7)
NEUTROPHILS NFR BLD AUTO: 1 10*3/MM3 (ref 1.7–7)
NEUTROPHILS NFR BLD AUTO: 2.2 10*3/MM3 (ref 1.7–7)
NEUTROPHILS NFR BLD AUTO: 22.7 % (ref 42.7–76)
NEUTROPHILS NFR BLD AUTO: 3.7 10*3/MM3 (ref 1.7–7)
NEUTROPHILS NFR BLD AUTO: 4.3 10*3/MM3 (ref 1.7–7)
NEUTROPHILS NFR BLD AUTO: 41.5 % (ref 42.7–76)
NEUTROPHILS NFR BLD AUTO: 44 % (ref 42.7–76)
NEUTROPHILS NFR BLD AUTO: 51.4 % (ref 42.7–76)
NEUTROPHILS NFR BLD AUTO: 56.6 % (ref 42.7–76)
NEUTROPHILS NFR BLD AUTO: 6.1 10*3/MM3 (ref 1.7–7)
NEUTROPHILS NFR BLD AUTO: 6.2 10*3/MM3 (ref 1.7–7)
NEUTROPHILS NFR BLD AUTO: 62.5 % (ref 42.7–76)
NEUTROPHILS NFR BLD AUTO: 66 % (ref 42.7–76)
NEUTROPHILS NFR BLD AUTO: 7 10*3/MM3 (ref 1.7–7)
NEUTROPHILS NFR BLD AUTO: 72.7 % (ref 42.7–76)
NEUTROPHILS NFR BLD AUTO: 72.7 % (ref 42.7–76)
NEUTROPHILS NFR BLD AUTO: 73.3 % (ref 42.7–76)
NEUTROPHILS NFR BLD AUTO: 74.8 % (ref 42.7–76)
NEUTROPHILS NFR BLD AUTO: 81.8 % (ref 42.7–76)
NEUTROPHILS NFR BLD AUTO: 86.6 % (ref 42.7–76)
NEUTROPHILS NFR BLD AUTO: 9 10*3/MM3 (ref 1.7–7)
OPIATES UR QL: NEGATIVE
OPIATES UR QL: POSITIVE
OXYCODONE UR QL SCN: NEGATIVE
OXYCODONE UR QL SCN: POSITIVE
PCP UR QL SCN: NEGATIVE
PLATELET # BLD AUTO: 180 10*3/MM3 (ref 140–450)
PLATELET # BLD AUTO: 242 10*3/MM3 (ref 140–450)
PLATELET # BLD AUTO: 271 10*3/MM3 (ref 140–450)
PLATELET # BLD AUTO: 285 10*3/MM3 (ref 140–450)
PLATELET # BLD AUTO: 287 10*3/MM3 (ref 140–450)
PLATELET # BLD AUTO: 302 10*3/MM3 (ref 140–450)
PLATELET # BLD AUTO: 303 10*3/MM3 (ref 140–450)
PLATELET # BLD AUTO: 354 10*3/MM3 (ref 140–450)
PLATELET # BLD AUTO: 377 10*3/MM3 (ref 140–450)
PLATELET # BLD AUTO: 389 10*3/MM3 (ref 140–450)
PLATELET # BLD AUTO: 393 10*3/MM3 (ref 140–450)
PLATELET # BLD AUTO: 406 10*3/MM3 (ref 140–450)
PLATELET # BLD AUTO: 496 10*3/MM3 (ref 140–450)
PMV BLD AUTO: 6.7 FL (ref 6–12)
PMV BLD AUTO: 6.7 FL (ref 6–12)
PMV BLD AUTO: 6.8 FL (ref 6–12)
PMV BLD AUTO: 7 FL (ref 6–12)
PMV BLD AUTO: 7 FL (ref 6–12)
PMV BLD AUTO: 7.2 FL (ref 6–12)
PMV BLD AUTO: 7.2 FL (ref 6–12)
PMV BLD AUTO: 7.3 FL (ref 6–12)
PMV BLD AUTO: 7.4 FL (ref 6–12)
PMV BLD AUTO: 7.6 FL (ref 6–12)
PMV BLD AUTO: 8 FL (ref 6–12)
POTASSIUM BLD-SCNC: 4 MMOL/L (ref 3.5–5.2)
POTASSIUM BLD-SCNC: 4.2 MMOL/L (ref 3.5–5.2)
POTASSIUM BLD-SCNC: 4.3 MMOL/L (ref 3.5–5.2)
POTASSIUM BLD-SCNC: 4.3 MMOL/L (ref 3.5–5.2)
POTASSIUM BLD-SCNC: 4.6 MMOL/L (ref 3.5–5.2)
POTASSIUM SERPL-SCNC: 3.6 MMOL/L (ref 3.5–5.2)
POTASSIUM SERPL-SCNC: 3.8 MMOL/L (ref 3.5–5.2)
POTASSIUM SERPL-SCNC: 4 MMOL/L (ref 3.5–5.2)
POTASSIUM SERPL-SCNC: 4.2 MMOL/L (ref 3.5–5.2)
POTASSIUM SERPL-SCNC: 4.3 MMOL/L (ref 3.5–5.2)
POTASSIUM SERPL-SCNC: 4.4 MMOL/L (ref 3.5–5.2)
PROPOXYPH UR QL: NEGATIVE
PROT SERPL-MCNC: 5.9 G/DL (ref 6–8.5)
PROT SERPL-MCNC: 6 G/DL (ref 6–8.5)
PROT SERPL-MCNC: 6.2 G/DL (ref 6–8.5)
PROT SERPL-MCNC: 6.3 G/DL (ref 6–8.5)
PROT SERPL-MCNC: 6.4 G/DL (ref 6–8.5)
PROT SERPL-MCNC: 6.5 G/DL (ref 6–8.5)
PROT SERPL-MCNC: 6.6 G/DL (ref 6–8.5)
PROT SERPL-MCNC: 6.7 G/DL (ref 6–8.5)
PROT SERPL-MCNC: 6.8 G/DL (ref 6–8.5)
PROT SERPL-MCNC: 6.9 G/DL (ref 6–8.5)
PROT SERPL-MCNC: 7.3 G/DL (ref 6–8.5)
PROT SERPL-MCNC: 7.4 G/DL (ref 6–8.5)
PROT SERPL-MCNC: 7.4 G/DL (ref 6–8.5)
RBC # BLD AUTO: 3.17 10*6/MM3 (ref 3.77–5.28)
RBC # BLD AUTO: 3.25 10*6/MM3 (ref 3.77–5.28)
RBC # BLD AUTO: 3.29 10*6/MM3 (ref 3.77–5.28)
RBC # BLD AUTO: 3.29 10*6/MM3 (ref 3.77–5.28)
RBC # BLD AUTO: 3.35 10*6/MM3 (ref 3.77–5.28)
RBC # BLD AUTO: 3.35 10*6/MM3 (ref 3.77–5.28)
RBC # BLD AUTO: 3.49 10*6/MM3 (ref 3.77–5.28)
RBC # BLD AUTO: 3.59 10*6/MM3 (ref 3.77–5.28)
RBC # BLD AUTO: 3.72 10*6/MM3 (ref 3.77–5.28)
RBC # BLD AUTO: 3.88 10*6/MM3 (ref 3.77–5.28)
RBC # BLD AUTO: 4.02 10*6/MM3 (ref 3.77–5.28)
RBC # BLD AUTO: 4.12 10*6/MM3 (ref 3.77–5.28)
RBC # BLD AUTO: 4.13 10*6/MM3 (ref 3.77–5.28)
REF LAB TEST METHOD: NORMAL
SARS-COV-2 RNA RESP QL NAA+PROBE: NOT DETECTED
SODIUM BLD-SCNC: 135 MMOL/L (ref 136–145)
SODIUM BLD-SCNC: 137 MMOL/L (ref 136–145)
SODIUM BLD-SCNC: 137 MMOL/L (ref 136–145)
SODIUM BLD-SCNC: 138 MMOL/L (ref 136–145)
SODIUM BLD-SCNC: 139 MMOL/L (ref 136–145)
SODIUM SERPL-SCNC: 137 MMOL/L (ref 136–145)
SODIUM SERPL-SCNC: 139 MMOL/L (ref 136–145)
SODIUM SERPL-SCNC: 139 MMOL/L (ref 136–145)
SODIUM SERPL-SCNC: 140 MMOL/L (ref 136–145)
SODIUM SERPL-SCNC: 142 MMOL/L (ref 136–145)
SODIUM SERPL-SCNC: 142 MMOL/L (ref 136–145)
TRICYCLICS UR QL SCN: NEGATIVE
WBC # BLD AUTO: 12.4 10*3/MM3 (ref 3.4–10.8)
WBC # BLD AUTO: 4.5 10*3/MM3 (ref 3.4–10.8)
WBC # BLD AUTO: 5.1 10*3/MM3 (ref 3.4–10.8)
WBC # BLD AUTO: 6.5 10*3/MM3 (ref 3.4–10.8)
WBC # BLD AUTO: 6.5 10*3/MM3 (ref 3.4–10.8)
WBC # BLD AUTO: 8.1 10*3/MM3 (ref 3.4–10.8)
WBC # BLD AUTO: 8.4 10*3/MM3 (ref 3.4–10.8)
WBC # BLD AUTO: 9.8 10*3/MM3 (ref 3.4–10.8)
WBC NRBC COR # BLD: 4.1 10*3/MM3 (ref 3.4–10.8)
WBC NRBC COR # BLD: 5.7 10*3/MM3 (ref 3.4–10.8)
WBC NRBC COR # BLD: 6.3 10*3/MM3 (ref 3.4–10.8)
WBC NRBC COR # BLD: 7.4 10*3/MM3 (ref 3.4–10.8)
WBC NRBC COR # BLD: 7.7 10*3/MM3 (ref 3.4–10.8)

## 2020-01-01 PROCEDURE — 96375 TX/PRO/DX INJ NEW DRUG ADDON: CPT

## 2020-01-01 PROCEDURE — 99214 OFFICE O/P EST MOD 30 MIN: CPT | Performed by: INTERNAL MEDICINE

## 2020-01-01 PROCEDURE — 25010000003 HEPARIN LOCK FLUSH PER 10 UNITS: Performed by: INTERNAL MEDICINE

## 2020-01-01 PROCEDURE — U0002 COVID-19 LAB TEST NON-CDC: HCPCS

## 2020-01-01 PROCEDURE — 85025 COMPLETE CBC W/AUTO DIFF WBC: CPT | Performed by: INTERNAL MEDICINE

## 2020-01-01 PROCEDURE — 25010000002 GEMCITABINE 1 GM/26.3ML SOLUTION 26.3 ML VIAL: Performed by: INTERNAL MEDICINE

## 2020-01-01 PROCEDURE — 96417 CHEMO IV INFUS EACH ADDL SEQ: CPT

## 2020-01-01 PROCEDURE — 25010000002 DOCETAXEL 20 MG/ML SOLUTION 8 ML VIAL: Performed by: INTERNAL MEDICINE

## 2020-01-01 PROCEDURE — 96413 CHEMO IV INFUSION 1 HR: CPT

## 2020-01-01 PROCEDURE — A9552 F18 FDG: HCPCS | Performed by: INTERNAL MEDICINE

## 2020-01-01 PROCEDURE — 85025 COMPLETE CBC W/AUTO DIFF WBC: CPT | Performed by: NURSE PRACTITIONER

## 2020-01-01 PROCEDURE — 25010000002 DIPHENHYDRAMINE PER 50 MG: Performed by: NURSE PRACTITIONER

## 2020-01-01 PROCEDURE — 96415 CHEMO IV INFUSION ADDL HR: CPT

## 2020-01-01 PROCEDURE — 78815 PET IMAGE W/CT SKULL-THIGH: CPT

## 2020-01-01 PROCEDURE — 96361 HYDRATE IV INFUSION ADD-ON: CPT

## 2020-01-01 PROCEDURE — 99213 OFFICE O/P EST LOW 20 MIN: CPT | Performed by: INTERNAL MEDICINE

## 2020-01-01 PROCEDURE — 25010000002 PEGFILGRASTIM 6 MG/0.6ML PREFILLED SYRINGE KIT: Performed by: INTERNAL MEDICINE

## 2020-01-01 PROCEDURE — 25010000002 GEMCITABINE 200 MG/5.26ML SOLUTION 5.26 ML VIAL: Performed by: INTERNAL MEDICINE

## 2020-01-01 PROCEDURE — 80053 COMPREHEN METABOLIC PANEL: CPT | Performed by: INTERNAL MEDICINE

## 2020-01-01 PROCEDURE — 25010000002 DEXAMETHASONE SODIUM PHOSPHATE 120 MG/30ML SOLUTION: Performed by: NURSE PRACTITIONER

## 2020-01-01 PROCEDURE — 80306 DRUG TEST PRSMV INSTRMNT: CPT | Performed by: INTERNAL MEDICINE

## 2020-01-01 PROCEDURE — 25010000002 GEMCITABINE 200 MG/5.26ML SOLUTION 5.26 ML VIAL: Performed by: NURSE PRACTITIONER

## 2020-01-01 PROCEDURE — 80053 COMPREHEN METABOLIC PANEL: CPT | Performed by: NURSE PRACTITIONER

## 2020-01-01 PROCEDURE — 25010000002 DEXAMETHASONE PER 1 MG: Performed by: INTERNAL MEDICINE

## 2020-01-01 PROCEDURE — 82140 ASSAY OF AMMONIA: CPT | Performed by: INTERNAL MEDICINE

## 2020-01-01 PROCEDURE — 96377 APPLICATON ON-BODY INJECTOR: CPT

## 2020-01-01 PROCEDURE — 25010000002 DIPHENHYDRAMINE PER 50 MG: Performed by: INTERNAL MEDICINE

## 2020-01-01 PROCEDURE — 71260 CT THORAX DX C+: CPT

## 2020-01-01 PROCEDURE — 80053 COMPREHEN METABOLIC PANEL: CPT

## 2020-01-01 PROCEDURE — 80307 DRUG TEST PRSMV CHEM ANLYZR: CPT | Performed by: INTERNAL MEDICINE

## 2020-01-01 PROCEDURE — 99214 OFFICE O/P EST MOD 30 MIN: CPT | Performed by: NURSE PRACTITIONER

## 2020-01-01 PROCEDURE — 25010000002 DOCETAXEL 20 MG/ML SOLUTION 8 ML VIAL: Performed by: NURSE PRACTITIONER

## 2020-01-01 PROCEDURE — 25010000002 IOPAMIDOL 61 % SOLUTION: Performed by: INTERNAL MEDICINE

## 2020-01-01 PROCEDURE — 25010000002 GEMCITABINE 1 GM/26.3ML SOLUTION 26.3 ML VIAL: Performed by: NURSE PRACTITIONER

## 2020-01-01 PROCEDURE — 80306 DRUG TEST PRSMV INSTRMNT: CPT | Performed by: NURSE PRACTITIONER

## 2020-01-01 PROCEDURE — 74176 CT ABD & PELVIS W/O CONTRAST: CPT

## 2020-01-01 PROCEDURE — 93356 MYOCRD STRAIN IMG SPCKL TRCK: CPT | Performed by: INTERNAL MEDICINE

## 2020-01-01 PROCEDURE — 96360 HYDRATION IV INFUSION INIT: CPT

## 2020-01-01 PROCEDURE — 25010000002 PEGFILGRASTIM 6 MG/0.6ML PREFILLED SYRINGE KIT: Performed by: NURSE PRACTITIONER

## 2020-01-01 PROCEDURE — 25010000002 DEXAMETHASONE SODIUM PHOSPHATE 120 MG/30ML SOLUTION: Performed by: INTERNAL MEDICINE

## 2020-01-01 PROCEDURE — 71045 X-RAY EXAM CHEST 1 VIEW: CPT

## 2020-01-01 PROCEDURE — 0 IOPAMIDOL PER 1 ML: Performed by: INTERNAL MEDICINE

## 2020-01-01 PROCEDURE — 80306 DRUG TEST PRSMV INSTRMNT: CPT

## 2020-01-01 PROCEDURE — 85025 COMPLETE CBC W/AUTO DIFF WBC: CPT

## 2020-01-01 PROCEDURE — C9803 HOPD COVID-19 SPEC COLLECT: HCPCS

## 2020-01-01 PROCEDURE — 0 FLUDEOXYGLUCOSE F18 SOLUTION: Performed by: INTERNAL MEDICINE

## 2020-01-01 PROCEDURE — U0004 COV-19 TEST NON-CDC HGH THRU: HCPCS

## 2020-01-01 PROCEDURE — 99213 OFFICE O/P EST LOW 20 MIN: CPT | Performed by: NURSE PRACTITIONER

## 2020-01-01 PROCEDURE — 93356 MYOCRD STRAIN IMG SPCKL TRCK: CPT

## 2020-01-01 PROCEDURE — 99215 OFFICE O/P EST HI 40 MIN: CPT | Performed by: NURSE PRACTITIONER

## 2020-01-01 PROCEDURE — 82565 ASSAY OF CREATININE: CPT

## 2020-01-01 PROCEDURE — 71250 CT THORAX DX C-: CPT

## 2020-01-01 PROCEDURE — 25010000002 DEXAMETHASONE SODIUM PHOSPHATE 100 MG/10ML SOLUTION: Performed by: NURSE PRACTITIONER

## 2020-01-01 PROCEDURE — 74177 CT ABD & PELVIS W/CONTRAST: CPT

## 2020-01-01 PROCEDURE — 93306 TTE W/DOPPLER COMPLETE: CPT

## 2020-01-01 PROCEDURE — 25010000002 DEXAMETHASONE SODIUM PHOSPHATE 20 MG/5ML SOLUTION: Performed by: INTERNAL MEDICINE

## 2020-01-01 PROCEDURE — 82962 GLUCOSE BLOOD TEST: CPT

## 2020-01-01 PROCEDURE — 93306 TTE W/DOPPLER COMPLETE: CPT | Performed by: INTERNAL MEDICINE

## 2020-01-01 PROCEDURE — 25010000002 DEXAMETHASONE SODIUM PHOSPHATE 20 MG/5ML SOLUTION

## 2020-01-01 PROCEDURE — 74178 CT ABD&PLV WO CNTR FLWD CNTR: CPT

## 2020-01-01 PROCEDURE — 99215 OFFICE O/P EST HI 40 MIN: CPT | Performed by: INTERNAL MEDICINE

## 2020-01-01 PROCEDURE — 36415 COLL VENOUS BLD VENIPUNCTURE: CPT

## 2020-01-01 RX ORDER — SODIUM CHLORIDE 0.9 % (FLUSH) 0.9 %
10 SYRINGE (ML) INJECTION AS NEEDED
Status: CANCELLED | OUTPATIENT
Start: 2020-01-01

## 2020-01-01 RX ORDER — HEPARIN SODIUM (PORCINE) LOCK FLUSH IV SOLN 100 UNIT/ML 100 UNIT/ML
500 SOLUTION INTRAVENOUS AS NEEDED
Status: CANCELLED | OUTPATIENT
Start: 2020-01-01

## 2020-01-01 RX ORDER — SODIUM CHLORIDE 9 MG/ML
250 INJECTION, SOLUTION INTRAVENOUS ONCE
Status: COMPLETED | OUTPATIENT
Start: 2020-01-01 | End: 2020-01-01

## 2020-01-01 RX ORDER — SODIUM CHLORIDE 9 MG/ML
250 INJECTION, SOLUTION INTRAVENOUS ONCE
Status: CANCELLED | OUTPATIENT
Start: 2020-01-01

## 2020-01-01 RX ORDER — FAMOTIDINE 10 MG/ML
20 INJECTION, SOLUTION INTRAVENOUS ONCE
Status: CANCELLED | OUTPATIENT
Start: 2020-01-01

## 2020-01-01 RX ORDER — SODIUM CHLORIDE 9 MG/ML
250 INJECTION, SOLUTION INTRAVENOUS ONCE
Status: DISCONTINUED | OUTPATIENT
Start: 2020-01-01 | End: 2020-01-01 | Stop reason: HOSPADM

## 2020-01-01 RX ORDER — PROMETHAZINE HYDROCHLORIDE 25 MG/1
TABLET ORAL
COMMUNITY
Start: 2020-01-01

## 2020-01-01 RX ORDER — DEXAMETHASONE 4 MG/1
TABLET ORAL
Qty: 12 TABLET | Refills: 5 | Status: SHIPPED | OUTPATIENT
Start: 2020-01-01 | End: 2020-01-01

## 2020-01-01 RX ORDER — SODIUM CHLORIDE 0.9 % (FLUSH) 0.9 %
10 SYRINGE (ML) INJECTION AS NEEDED
Status: DISCONTINUED | OUTPATIENT
Start: 2020-01-01 | End: 2020-01-01 | Stop reason: HOSPADM

## 2020-01-01 RX ORDER — HEPARIN SODIUM (PORCINE) LOCK FLUSH IV SOLN 100 UNIT/ML 100 UNIT/ML
500 SOLUTION INTRAVENOUS AS NEEDED
Status: DISCONTINUED | OUTPATIENT
Start: 2020-01-01 | End: 2020-01-01 | Stop reason: HOSPADM

## 2020-01-01 RX ORDER — DIPHENHYDRAMINE HYDROCHLORIDE 50 MG/ML
50 INJECTION INTRAMUSCULAR; INTRAVENOUS AS NEEDED
Status: DISCONTINUED | OUTPATIENT
Start: 2020-01-01 | End: 2020-01-01 | Stop reason: HOSPADM

## 2020-01-01 RX ORDER — DIPHENHYDRAMINE, LIDOCAINE, NYSTATIN
KIT ORAL
Qty: 240 ML | Refills: 3 | Status: SHIPPED | OUTPATIENT
Start: 2020-01-01 | End: 2020-01-01

## 2020-01-01 RX ORDER — ONDANSETRON HYDROCHLORIDE 8 MG/1
8 TABLET, FILM COATED ORAL 3 TIMES DAILY PRN
Qty: 30 TABLET | Refills: 5 | Status: SHIPPED | OUTPATIENT
Start: 2020-01-01 | End: 2020-01-01

## 2020-01-01 RX ORDER — FAMOTIDINE 10 MG/ML
20 INJECTION, SOLUTION INTRAVENOUS AS NEEDED
Status: CANCELLED | OUTPATIENT
Start: 2020-01-01

## 2020-01-01 RX ORDER — BISACODYL 10 MG
SUPPOSITORY, RECTAL RECTAL
COMMUNITY
Start: 2020-01-01

## 2020-01-01 RX ORDER — DIPHENHYDRAMINE HYDROCHLORIDE 50 MG/ML
50 INJECTION INTRAMUSCULAR; INTRAVENOUS AS NEEDED
Status: CANCELLED | OUTPATIENT
Start: 2020-01-01

## 2020-01-01 RX ORDER — OXYCODONE HYDROCHLORIDE 10 MG/1
TABLET ORAL
Qty: 60 TABLET | Refills: 0 | Status: SHIPPED | OUTPATIENT
Start: 2020-01-01 | End: 2020-01-01 | Stop reason: SDUPTHER

## 2020-01-01 RX ORDER — DEXAMETHASONE SODIUM PHOSPHATE 4 MG/ML
10 INJECTION, SOLUTION INTRA-ARTICULAR; INTRALESIONAL; INTRAMUSCULAR; INTRAVENOUS; SOFT TISSUE ONCE
Status: DISCONTINUED | OUTPATIENT
Start: 2020-01-01 | End: 2020-01-01

## 2020-01-01 RX ORDER — DEXAMETHASONE IN 0.9 % SOD CHL 10 MG/50ML
10 INTRAVENOUS SOLUTION, PIGGYBACK (ML) INTRAVENOUS ONCE
Status: COMPLETED | OUTPATIENT
Start: 2020-01-01 | End: 2020-01-01

## 2020-01-01 RX ORDER — FAMOTIDINE 10 MG/ML
20 INJECTION, SOLUTION INTRAVENOUS ONCE
Status: COMPLETED | OUTPATIENT
Start: 2020-01-01 | End: 2020-01-01

## 2020-01-01 RX ORDER — FAMOTIDINE 10 MG/ML
20 INJECTION, SOLUTION INTRAVENOUS AS NEEDED
Status: COMPLETED | OUTPATIENT
Start: 2020-01-01 | End: 2020-01-01

## 2020-01-01 RX ORDER — OXYCODONE HYDROCHLORIDE 10 MG/1
TABLET ORAL
COMMUNITY
Start: 2020-01-01 | End: 2020-01-01 | Stop reason: SDUPTHER

## 2020-01-01 RX ORDER — OXYCODONE HYDROCHLORIDE 10 MG/1
TABLET ORAL
Qty: 60 TABLET | Refills: 0 | OUTPATIENT
Start: 2020-01-01

## 2020-01-01 RX ORDER — PALONOSETRON 0.05 MG/ML
0.25 INJECTION, SOLUTION INTRAVENOUS ONCE
Status: CANCELLED | OUTPATIENT
Start: 2020-01-01

## 2020-01-01 RX ORDER — OXYCODONE HYDROCHLORIDE 10 MG/1
10 TABLET ORAL 2 TIMES DAILY PRN
Qty: 30 TABLET | Refills: 0 | Status: SHIPPED | OUTPATIENT
Start: 2020-01-01 | End: 2020-01-01 | Stop reason: SDUPTHER

## 2020-01-01 RX ORDER — DEXAMETHASONE SODIUM PHOSPHATE 4 MG/ML
10 INJECTION, SOLUTION INTRA-ARTICULAR; INTRALESIONAL; INTRAMUSCULAR; INTRAVENOUS; SOFT TISSUE ONCE
Status: CANCELLED
Start: 2020-01-01

## 2020-01-01 RX ORDER — GABAPENTIN 300 MG/1
600 CAPSULE ORAL 3 TIMES DAILY
Qty: 180 CAPSULE | Refills: 3 | Status: SHIPPED | OUTPATIENT
Start: 2020-01-01

## 2020-01-01 RX ORDER — GABAPENTIN 300 MG/1
600 CAPSULE ORAL 3 TIMES DAILY
Qty: 180 CAPSULE | Refills: 3 | Status: SHIPPED | OUTPATIENT
Start: 2020-01-01 | End: 2020-01-01 | Stop reason: SDUPTHER

## 2020-01-01 RX ORDER — SODIUM CHLORIDE 9 MG/ML
1000 INJECTION, SOLUTION INTRAVENOUS ONCE
Status: COMPLETED | OUTPATIENT
Start: 2020-01-01 | End: 2020-01-01

## 2020-01-01 RX ORDER — HYDROCODONE BITARTRATE AND ACETAMINOPHEN 5; 325 MG/1; MG/1
TABLET ORAL
Status: CANCELLED | OUTPATIENT
Start: 2020-01-01

## 2020-01-01 RX ORDER — OXYCODONE HYDROCHLORIDE 10 MG/1
10 TABLET ORAL 2 TIMES DAILY PRN
Qty: 30 TABLET | Refills: 0 | Status: SHIPPED | OUTPATIENT
Start: 2020-01-01

## 2020-01-01 RX ORDER — LIDOCAINE AND PRILOCAINE 25; 25 MG/G; MG/G
CREAM TOPICAL AS NEEDED
Qty: 30 G | Refills: 3 | Status: SHIPPED | OUTPATIENT
Start: 2020-01-01

## 2020-01-01 RX ORDER — BUPROPION HYDROCHLORIDE 150 MG/1
TABLET ORAL DAILY
COMMUNITY
Start: 2020-01-01

## 2020-01-01 RX ORDER — HYDROCODONE BITARTRATE AND ACETAMINOPHEN 5; 325 MG/1; MG/1
TABLET ORAL
COMMUNITY
Start: 2020-01-01 | End: 2020-01-01

## 2020-01-01 RX ORDER — FLUCONAZOLE 150 MG/1
150 TABLET ORAL ONCE
Qty: 1 TABLET | Refills: 0 | Status: SHIPPED | OUTPATIENT
Start: 2020-01-01 | End: 2020-01-01

## 2020-01-01 RX ORDER — FLUCONAZOLE 150 MG/1
TABLET ORAL
COMMUNITY
Start: 2020-01-01

## 2020-01-01 RX ORDER — OXYCODONE HYDROCHLORIDE 10 MG/1
TABLET ORAL
Qty: 60 TABLET | Refills: 0 | Status: CANCELLED | OUTPATIENT
Start: 2020-01-01

## 2020-01-01 RX ADMIN — DOCETAXEL 125 MG: 20 INJECTION, SOLUTION, CONCENTRATE INTRAVENOUS at 16:17

## 2020-01-01 RX ADMIN — GEMCITABINE 1500 MG: 38 INJECTION, SOLUTION INTRAVENOUS at 13:45

## 2020-01-01 RX ADMIN — GEMCITABINE 1500 MG: 38 INJECTION, SOLUTION INTRAVENOUS at 14:33

## 2020-01-01 RX ADMIN — DEXAMETHASONE SODIUM PHOSPHATE 10 MG: 4 INJECTION, SOLUTION INTRA-ARTICULAR; INTRALESIONAL; INTRAMUSCULAR; INTRAVENOUS; SOFT TISSUE at 13:30

## 2020-01-01 RX ADMIN — HEPARIN 500 UNITS: 100 SYRINGE at 13:46

## 2020-01-01 RX ADMIN — GEMCITABINE 1500 MG: 38 INJECTION, SOLUTION INTRAVENOUS at 09:57

## 2020-01-01 RX ADMIN — IOPAMIDOL 100 ML: 755 INJECTION, SOLUTION INTRAVENOUS at 11:44

## 2020-01-01 RX ADMIN — HEPARIN 500 UNITS: 100 SYRINGE at 16:26

## 2020-01-01 RX ADMIN — HEPARIN 500 UNITS: 100 SYRINGE at 11:06

## 2020-01-01 RX ADMIN — HEPARIN 500 UNITS: 100 SYRINGE at 15:39

## 2020-01-01 RX ADMIN — FAMOTIDINE 20 MG: 10 INJECTION, SOLUTION INTRAVENOUS at 16:12

## 2020-01-01 RX ADMIN — DEXAMETHASONE SODIUM PHOSPHATE 12 MG: 4 INJECTION, SOLUTION INTRA-ARTICULAR; INTRALESIONAL; INTRAMUSCULAR; INTRAVENOUS; SOFT TISSUE at 09:40

## 2020-01-01 RX ADMIN — HEPARIN 500 UNITS: 100 SYRINGE at 14:10

## 2020-01-01 RX ADMIN — GEMCITABINE 1500 MG: 38 INJECTION, SOLUTION INTRAVENOUS at 12:50

## 2020-01-01 RX ADMIN — HEPARIN 500 UNITS: 100 SYRINGE at 11:27

## 2020-01-01 RX ADMIN — SODIUM CHLORIDE 250 ML: 9 INJECTION, SOLUTION INTRAVENOUS at 09:40

## 2020-01-01 RX ADMIN — DOCETAXEL 75 MG: 20 INJECTION, SOLUTION, CONCENTRATE INTRAVENOUS at 16:05

## 2020-01-01 RX ADMIN — SODIUM CHLORIDE, PRESERVATIVE FREE 10 ML: 5 INJECTION INTRAVENOUS at 14:17

## 2020-01-01 RX ADMIN — PEGFILGRASTIM 6 MG: KIT SUBCUTANEOUS at 16:28

## 2020-01-01 RX ADMIN — GEMCITABINE 1200 MG: 38 INJECTION, SOLUTION INTRAVENOUS at 14:18

## 2020-01-01 RX ADMIN — DIPHENHYDRAMINE HYDROCHLORIDE 25 MG: 50 INJECTION INTRAMUSCULAR; INTRAVENOUS at 12:23

## 2020-01-01 RX ADMIN — DOCETAXEL 125 MG: 20 INJECTION, SOLUTION, CONCENTRATE INTRAVENOUS at 15:24

## 2020-01-01 RX ADMIN — DIPHENHYDRAMINE HYDROCHLORIDE 25 MG: 50 INJECTION INTRAMUSCULAR; INTRAVENOUS at 13:30

## 2020-01-01 RX ADMIN — SODIUM CHLORIDE, PRESERVATIVE FREE 10 ML: 5 INJECTION INTRAVENOUS at 13:46

## 2020-01-01 RX ADMIN — SODIUM CHLORIDE, PRESERVATIVE FREE 10 ML: 5 INJECTION INTRAVENOUS at 16:26

## 2020-01-01 RX ADMIN — GEMCITABINE 1500 MG: 38 INJECTION, SOLUTION INTRAVENOUS at 11:46

## 2020-01-01 RX ADMIN — PEGFILGRASTIM 6 MG: KIT SUBCUTANEOUS at 17:20

## 2020-01-01 RX ADMIN — DEXAMETHASONE SODIUM PHOSPHATE 12 MG: 4 INJECTION, SOLUTION INTRAMUSCULAR; INTRAVENOUS at 12:12

## 2020-01-01 RX ADMIN — SODIUM CHLORIDE 1000 ML: 9 INJECTION, SOLUTION INTRAVENOUS at 11:34

## 2020-01-01 RX ADMIN — HEPARIN 500 UNITS: 100 SYRINGE at 17:12

## 2020-01-01 RX ADMIN — SODIUM CHLORIDE 1000 ML: 9 INJECTION, SOLUTION INTRAVENOUS at 12:51

## 2020-01-01 RX ADMIN — DEXAMETHASONE SODIUM PHOSPHATE 12 MG: 4 INJECTION, SOLUTION INTRAMUSCULAR; INTRAVENOUS at 11:33

## 2020-01-01 RX ADMIN — SODIUM CHLORIDE 250 ML: 9 INJECTION, SOLUTION INTRAVENOUS at 12:20

## 2020-01-01 RX ADMIN — HEPARIN 500 UNITS: 100 SYRINGE at 15:48

## 2020-01-01 RX ADMIN — PEGFILGRASTIM 6 MG: KIT SUBCUTANEOUS at 14:14

## 2020-01-01 RX ADMIN — IOPAMIDOL 85 ML: 612 INJECTION, SOLUTION INTRAVENOUS at 13:45

## 2020-01-01 RX ADMIN — SODIUM CHLORIDE, PRESERVATIVE FREE 10 ML: 5 INJECTION INTRAVENOUS at 11:06

## 2020-01-01 RX ADMIN — HEPARIN 500 UNITS: 100 SYRINGE at 14:17

## 2020-01-01 RX ADMIN — FAMOTIDINE 20 MG: 10 INJECTION INTRAVENOUS at 13:30

## 2020-01-01 RX ADMIN — DIPHENHYDRAMINE HYDROCHLORIDE 25 MG: 50 INJECTION INTRAMUSCULAR; INTRAVENOUS at 11:39

## 2020-01-01 RX ADMIN — FAMOTIDINE 20 MG: 10 INJECTION, SOLUTION INTRAVENOUS at 12:52

## 2020-01-01 RX ADMIN — DEXAMETHASONE SODIUM PHOSPHATE 12 MG: 4 INJECTION INTRA-ARTICULAR; INTRALESIONAL; INTRAMUSCULAR; INTRAVENOUS; SOFT TISSUE at 14:42

## 2020-01-01 RX ADMIN — FAMOTIDINE 20 MG: 10 INJECTION, SOLUTION INTRAVENOUS at 12:20

## 2020-01-01 RX ADMIN — DOCETAXEL 75 MG: 20 INJECTION, SOLUTION, CONCENTRATE INTRAVENOUS at 13:13

## 2020-01-01 RX ADMIN — DEXAMETHASONE SODIUM PHOSPHATE 12 MG: 4 INJECTION, SOLUTION INTRAMUSCULAR; INTRAVENOUS at 09:53

## 2020-01-01 RX ADMIN — GEMCITABINE 1500 MG: 38 INJECTION, SOLUTION INTRAVENOUS at 10:11

## 2020-01-01 RX ADMIN — SODIUM CHLORIDE, PRESERVATIVE FREE 10 ML: 5 INJECTION INTRAVENOUS at 11:53

## 2020-01-01 RX ADMIN — PEGFILGRASTIM 6 MG: KIT SUBCUTANEOUS at 17:12

## 2020-01-01 RX ADMIN — SODIUM CHLORIDE 250 ML: 9 INJECTION, SOLUTION INTRAVENOUS at 12:52

## 2020-01-01 RX ADMIN — GEMCITABINE 1500 MG: 38 INJECTION INTRAVENOUS at 12:04

## 2020-01-01 RX ADMIN — GEMCITABINE 1200 MG: 38 INJECTION, SOLUTION INTRAVENOUS at 10:32

## 2020-01-01 RX ADMIN — GEMCITABINE 1200 MG: 38 INJECTION, SOLUTION INTRAVENOUS at 15:08

## 2020-01-01 RX ADMIN — DIPHENHYDRAMINE HYDROCHLORIDE 25 MG: 50 INJECTION INTRAMUSCULAR; INTRAVENOUS at 13:04

## 2020-01-01 RX ADMIN — PEGFILGRASTIM 6 MG: KIT SUBCUTANEOUS at 15:14

## 2020-01-01 RX ADMIN — DEXAMETHASONE SODIUM PHOSPHATE 12 MG: 4 INJECTION, SOLUTION INTRAMUSCULAR; INTRAVENOUS at 11:23

## 2020-01-01 RX ADMIN — DEXAMETHASONE SODIUM PHOSPHATE 10 MG: 10 INJECTION INTRAMUSCULAR; INTRAVENOUS at 13:04

## 2020-01-01 RX ADMIN — HEPARIN 500 UNITS: 100 SYRINGE at 11:53

## 2020-01-01 RX ADMIN — FLUDEOXYGLUCOSE F18 1 DOSE: 300 INJECTION INTRAVENOUS at 14:30

## 2020-01-01 RX ADMIN — DEXAMETHASONE SODIUM PHOSPHATE 12 MG: 10 INJECTION, SOLUTION INTRAMUSCULAR; INTRAVENOUS at 10:12

## 2020-01-01 RX ADMIN — SODIUM CHLORIDE, PRESERVATIVE FREE 10 ML: 5 INJECTION INTRAVENOUS at 15:39

## 2020-01-01 RX ADMIN — GEMCITABINE 1200 MG: 38 INJECTION INTRAVENOUS at 11:50

## 2020-01-01 RX ADMIN — SODIUM CHLORIDE 250 ML: 9 INJECTION, SOLUTION INTRAVENOUS at 14:18

## 2020-01-01 RX ADMIN — DOCETAXEL 95 MG: 20 INJECTION, SOLUTION, CONCENTRATE INTRAVENOUS at 14:34

## 2020-01-01 RX ADMIN — SODIUM CHLORIDE 250 ML: 9 INJECTION, SOLUTION INTRAVENOUS at 11:33

## 2020-01-01 RX ADMIN — FAMOTIDINE 20 MG: 10 INJECTION INTRAVENOUS at 11:37

## 2020-01-01 RX ADMIN — GEMCITABINE 1500 MG: 38 INJECTION, SOLUTION INTRAVENOUS at 12:30

## 2020-01-01 RX ADMIN — SODIUM CHLORIDE 250 ML: 9 INJECTION, SOLUTION INTRAVENOUS at 14:27

## 2020-01-01 RX ADMIN — SODIUM CHLORIDE 250 ML: 9 INJECTION, SOLUTION INTRAVENOUS at 09:51

## 2020-04-23 NOTE — TELEPHONE ENCOUNTER
Pt called requesting refill of gabapentin 300 MG capsules.    Pt has 0 capsules left    Call pt at [phone] when prescription is ready to be picked up at [facility]

## 2020-05-12 NOTE — TELEPHONE ENCOUNTER
I called the patient back and she said that her stomach seemed to get larger overnight.  I asked her about bowels and she said that she was not constipated or having any changes in the caliber of BM.  I instructed that she contact her primary care at this point to be evaluated as she is not receiving any treatment that would be causing this distention.  She verbalized understanding and she will contact pcp today.  I told her that the pcp could refer to us if she feels it is related to oncology.

## 2020-05-12 NOTE — TELEPHONE ENCOUNTER
Pt is calling states her stomach is very extended out with some pain  She wanted to know if she should make an appointment or go to the ER    Please advise and give call [phone] (H)

## 2020-05-13 NOTE — TELEPHONE ENCOUNTER
Patient left a voicemail requesting an  appointment with Dr. Fina PIEDRA.  States she went to the ER and had CT scan.  States they found tumor in her liver, rib, and back.  ER advised her they contacted Dr. Benitez and he advised her to call today and get an appointment with Dr. Mann. Her number is [phone].

## 2020-05-13 NOTE — TELEPHONE ENCOUNTER
I called and talked with patient and she said that she went to her pcp and they sent her to [facility] at [facility] to have a ct scan.  She had ct of abd/pelvis and I obtained the report and showed it to Dr.l Mann and Dr. Mann said we could see patient today.  I called patient back and told her we would see her at 2:30pm today and she will bring the disc of her scan with her.  I called scheduling and asked Shannon and she will add patient to our schedule.

## 2020-05-13 NOTE — PROGRESS NOTES
PROBLEM LIST:  1. Angiosarcoma of the left breast  A) presented with a palpable mass on August 2017.  Mammogram 8/14/2017 showed a 2 cm left upper outer quadrant mass.  Biopsy was positive for angiosarcoma.  A breast MRI showed a 4.3 cm vascular mass.  A PET CT was done which showed abnormal uptake in the pelvis and uterus.  She was evaluated by GYN oncology with an endometrial biopsy and MRI, all of which was benign.  She underwent a left mastectomy.  Lymph nodes were uninvolved.  She received adjuvant chemotherapy with adriamycin and ifosfamide and chest wall radiation.  B) Radical resection of retroperitoneal tumor per Dr. Luque 06/13/2019.   2.  Cervical dysplasia  3.  Gastroesophageal reflux disease       Subjective     CHIEF COMPLAINT: angiosarcoma of the breast      HISTORY OF PRESENT ILLNESS:   Marianne Rodriguez returns for follow-up.  She called yesterday with abdominal distention and pain.  She says that she has had about 3 to 4 weeks of abdominal swelling and firmness but the pain started just yesterday morning.  She also has had about 2 to 3 weeks of right-sided rib pain.  She denies nausea and vomiting.  She went to her PCP and was sent to the ER in [place].  There she had a ct a/p which showed multiple lesions in the liver consistent with metastatic disease, largest lesion measuring 7.6 cm.  There was also been destruction of the right lateral sixth rib concerning for metastatic disease.     She remains fairly tender in the LUQ.  She says she is eating but notices her appetite is decreased.  Bowel movements are regular.      Past Medical History, Past Surgical History, Social History, Family History have been reviewed and are without significant changes except as mentioned.    Review of Systems   A comprehensive 14 point review of systems was performed and was negative except as mentioned.    Medications:  The current medication list was reviewed in the EMR    ALLERGIES:  No Known  "Allergies    Objective      /65   Pulse 96   Temp 96 °F (35.6 °C) (Temporal)   Resp 18   Ht 162.6 cm (64.02\")   Wt 64 kg (141 lb)   SpO2 99%   BMI 24.19 kg/m²      Performance Status: 0  General: well appearing female in no acute distress  Neuro: alert and oriented  HEENT: sclera anicteric, oropharynx clear  Lymphatics: no cervical, supraclavicular, or axillary adenopathy  Cardiovascular: regular rate and rhythm, no murmurs  Lungs: clear to auscultation bilaterally  Abdomen: distended, tender in epigastrium and LUQ, no firm mass  Extremeties: no lower extremity edema  Skin: no rashes, lesions, bruising, or petechiae  Psych: mood and affect appropriate    I personally reviewed the outside images.  She has multiple lesions throughout the liver, largest in the dome measuring 7.6 cm.            Assessment/Plan   Marianne Rodriguez is a 44 y.o. year old female  with a history of angiosarcoma of the left breast.      Unfortunately she has what appears to be disease with plaques involving the liver and bone.  I think it is unlikely that this is anything other than her angiosarcoma.  She is symptomatic from this with abdominal distention and pain.  She also has pain from the right rib lesion.    We discussed that unfortunately this is not curable.  Surgery would not be helpful in this case.  I do think this will take her life in her life will be significantly shortened by this disease.  I recommended that she make another financial arrangements, advance directives, and reveals are necessary so she is prepared.  That being said there are certainly options for treatment and this will hopefully improve her quality of life as well as extend her life.    Angiosarcomas tend to be sensitive to taxanes.  However before making a definitive treatment plans I will plan to discuss this with a sarcoma specialist.  I asked her if she would be interested in traveling to have a second opinion at a sarcoma center.  She says " financially logistically does not really an option for her.  She also does not think that her insurance would cover this which is likely true.    I will order a PET scan for complete staging.  We will collect peripheral blood today to send for molecular testing to look for any targetable mutations.  I will check basic labs today as well.    Cancer related pain: Oxycodone 10 mg prescribed.  Discussed risk of constipation.    Follow-up will be scheduled in the next week or so.  I will contact her by phone with additional information.              I spent 40 minutes with the patient. I spent > 50% percent of this time counseling and discussing prognosis, diagnostic testing, evaluation, current status and management.        Charlotte Mann MD  [facility]    5/13/2020          CC:

## 2020-05-14 NOTE — TELEPHONE ENCOUNTER
Called patient to follow up.  After discussion with colleagues, will plan to treat with gemzar and taxotere starting next week. We discussed the potential side effects of gemcitabine including fatigue, nausea, constipation, myelosuppression, and neutropenic fever.  We discussed that Taxotere can cause nausea, myelosuppression, neutropenic fever, edema, fatigue, alopecia, neuropathy, and diarrhea.      PET scan scheduled for Tuesday.  We will start treatment through peripheral IV.  Defer liver biopsy for now in the interest of starting treatment quickly for symptomatic disease.

## 2020-05-18 NOTE — PROGRESS NOTES
CHEMOTHERAPY PREPARATION    Marianne Rodriguez  [id number]  1975    Chief Complaint: chemo education     History of present illness:  Marianne Rodriguez is a 44 y.o. year old female who is here today for chemotherapy preparation and needs assessment. The patient has been diagnosed with angiosarcoma of the breast and is scheduled to begin treatment with Gemcitabine / DOCEtaxel.     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)      5/21/2020 -  Chemotherapy     OP SARCOMA Gemcitabine (MAX DOSE 900 mg/m2) / DOCEtaxel         Past Medical History:   Diagnosis Date   • Abdominal mass    • Anxiety and depression    • Breast cancer (CMS/HCC)     Angisarcoma of the breast ~ Aug/Sept 2017 staus post Chemotherapy/Radiationtherapy   • Current every day smoker    • Gallstones    • GERD (gastroesophageal reflux disease)    • History of transfusion     2017   • Hypertension        Past Surgical History:   Procedure Laterality Date   • EXPLORATORY LAPAROTOMY N/A 6/13/2019    Procedure: RADICAL RESECTION OF RETROPERITONEAL TUMOR, INTRAOPERATIVE ULTRASOUND;  Surgeon: Tad Luque MD;  Location: [facility];  Service: General   • LAPAROSCOPIC CHOLECYSTECTOMY  2012   • LASIK     • LEEP  05/2017   • MASTECTOMY Left 09/2017   • TUBAL ABDOMINAL LIGATION  2002       MEDICATIONS: The current medication list was reviewed and reconciled.     Allergies:  has No Known Allergies.    Family History   Problem Relation Age of Onset   • Hypertension Mother    • Hyperlipidemia Mother    • Emphysema Mother    • Hodgkin's lymphoma Father         Non   • Prostate cancer Father    • Bone cancer Father          Review of Systems   Constitutional: Positive for appetite change and fatigue. Negative for chills and fever.   HENT: Negative.    Eyes: Negative.    Respiratory: Negative.    Cardiovascular: Negative.    Gastrointestinal:  "Positive for abdominal distention and abdominal pain. Negative for diarrhea, nausea and vomiting.   Endocrine: Negative.    Genitourinary: Negative.    Musculoskeletal:        Right rib pain    Skin: Negative.    Allergic/Immunologic: Negative.    Neurological: Negative.    Hematological: Negative.    Psychiatric/Behavioral: Positive for decreased concentration. The patient is nervous/anxious.        Physical Exam  Vital Signs: /64   Pulse 63   Temp 97.9 °F (36.6 °C)   Resp 16   Ht 162.6 cm (64\")   Wt 65.3 kg (144 lb)   SpO2 98%   BMI 24.72 kg/m²   Vitals:    05/18/20 1358   PainSc: 0-No pain           General Appearance:  alert, cooperative, no apparent distress and appears stated age   Neurologic/Psychiatric: A&O x 3, gait steady, appropriate affect   HEENT:  Normocephalic, without obvious abnormality, mucous membranes moist   Lungs:   Clear to auscultation bilaterally; respirations regular, even, and unlabored bilaterally   Heart:  Regular rate and rhythm, no murmurs appreciated   Extremities: Normal, atraumatic; no clubbing, cyanosis, or edema    Skin: No rashes, lesions, or abnormal coloration noted     ECOG Performance Status: (0) Fully Active - Able to Carry On All Pre-disease Performance Without Restriction          NEEDS ASSESSMENTS        Molecular Testing  Further molecular testing on tumor is indicated. Guardant 360 testing already sent.     Psychosocial  The patient has completed a PHQ-9 Depression Screening and the Distress Thermometer (DT) today.   PHQ-9 Total Score: 11. PHQ-9 results show 10-14 (Moderate Depression). The patient scored their distress today as 8 on a scale of 0-10 with 0 being no distress and 10 being extreme distress.   Problems marked by the patient as being an issue for them within the last week include emotional problems and physical problems.   Results were reviewed along with psychosocial resources offered by our cancer center. Our oncology social worker will be " "flagged for a DT score of 4 or above, and a same day call will be made for a score of 9 or 10. A mental health referral is recommended at this time. The patient is not accepting of a referral to [facility].   Copies of patient's questionnaires will be scanned into EMR for details and further reference.    Barriers to care  A barriers form was also completed by the patient today. We discussed services offered by our facility to help her have adequate access to care. The patient was given the name and card for our Oncology Social Worker, Britni Mcknight. Based upon barriers assessment today, the patient will require a follow-up call from the  to further discuss needs.   A copy of the barriers form will also be scanned into EMR for details and further reference.     VAD Assessment  The patient and I discussed planned intervenous chemotherapy as well as other IV treatments that are often needed throughout the course of treatment. These may include, but are not limited to blood transfusions, antibiotics, and IV hydration. The vasculature does appear to be adequate for multiple peripheral IVs throughout their treatment course. Discussed risks and benefits of VADs. The patient would not like to pursue Port-A-Cath insertion prior to initiation of treatment.     Advance Care Planning   ACP discussion was held with the patient during this visit. Patient does not have an advance directive, declines further assistance.  The patient and I discussed advanced care planning, \"Conversations that Matter\".   This service was offered, free of charge, for development of advance directives with a certified ACP facilitator.  The patient does not have an up-to-date advanced directive. This document is not on file with our office. The patient is not interested in an appointment with one of our facilitators to create or update their advanced directives.         Palliative Care  The patient and I discussed palliative care " services. Palliative care is not the same as Hospice care. This is specialized medical care for people living with serious illness with the goal of improving quality of life for the patient and their family. Brian has partnered with [facility] to offer our patients outpatient palliative care early along with their treatment to assist in coordination of care, symptom management, pain management, and medical decision making.  Oncology criteria for palliative care referral is not met at this time. The patient is not interested in a palliative care consultation.     Additional Referral needs  none      CHEMOTHERAPY EDUCATION    Booklets Given: Chemotherapy and You [x]  Eating Hints [x]    Sexuality/Fertility Books [x]      Chemotherapy/Biotherapy Education Sheets: (list all that apply)  nausea management, acid reflux management, diarrhea management, Cancer resourse contacts information, skin and mouth care and vaccination information                                                                                                                                                                 Chemotherapy Regimen:   Treatment Plans     Name Type Plan dates Plan Provider         Active    OP SARCOMA Gemcitabine (MAX DOSE 900 mg/m2) / DOCEtaxel ONCOLOGY TREATMENT  5/20/2020 - Present Charlotte Mann MD                    TOPICS EDUCATION PROVIDED COMMENTS   ANEMIA:  role of RBC, cause, s/s, ways to manage, role of transfusion [x]    THROMBOCYTOPENIA:  role of platelet, cause, s/s, ways to prevent bleeding, things to avoid, when to seek help [x]    NEUTROPENIA:  role of WBC, cause, infection precautions, s/s of infection, when to call MD [x]    NUTRITION & APPETITE CHANGES:  importance of maintaining healthy diet & weight, ways to manage to improve intake, dietary consult, exercise regimen [x]    DIARRHEA:  causes, s/s of dehydration, ways to manage, dietary changes, when to call MD [x]    CONSTIPATION:   causes, ways to manage, dietary changes, when to call MD [x]    NAUSEA & VOMITING:  cause, use of antiemetics, dietary changes, when to call MD [x]    MOUTH SORES:  causes, oral care, ways to manage [x]    ALOPECIA:  cause, ways to manage, resources [x]    INFERTILITY & SEXUALITY:  causes, fertility preservation options, sexuality changes, ways to manage, importance of birth control [x]    NERVOUS SYSTEM CHANGES:  causes, s/s, neuropathies, cognitive changes, ways to manage [x]    PAIN:  causes, ways to manage [x]    SKIN & NAIL CHANGES:  cause, s/s, ways to manage [x]    ORGAN TOXICITIES:  cause, s/s, need for diagnostic tests, labs, when to notify MD [x]    SURVIVORSHIP:  distress, distress assessment, secondary malignancies, early/late effects, follow-up, social issues, social support [x]    HOME CARE:  use of spill kits, storing of PO chemo, how to manage bodily fluids [x]    MISCELLANEOUS:  drug interactions, administration, vesicant, et [x]        Assessment and Plan:    Diagnoses and all orders for this visit:    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   The patient and I have reviewed their new cancer diagnosis and scheduled treatment plan. Needs assessment was completed including genetics, psychosocial needs, barriers to care, VAD evaluation, advanced care planning, and palliative care services. Referrals have been ordered as appropriate based upon our evaluation and patient desires.     Chemotherapy teaching was also completed today as documented above. Adequate time was given to answer all questions to her satisfaction. Patient is aware of their care team members and contact information if they have questions or problems throughout the treatment course. Needs assessments and education has been completed. The patient is adequately prepared to begin treatment as scheduled.      Pain assessment was performed today as a part of patient’s care. For patients with pain related to surgery,  malignancy or cancer treatment, the plan is as noted in the assessment/plan.  For patients with pain not related to these issues, they are to seek any further needed care from a more appropriate provider, such as PCP.    Reviewed with patient education regarding  Dexamethasone and Zofran prescriptions sent to pharmacy.      I advised the patient that she can take Tylenol or Ibuprofen as needed for aches/pains related to cancer/treatment. I also advised patient she could use Senakot or Miralax as needed for constipation or Imodium as needed for diarrhea.       I reviewed with the patient the care team members. I also reviewed the option of the urgent care clinic through our oncology office for evaluation and management of symptoms related to treatment.        Shannon Rico, APRN   05/18/2020

## 2020-05-20 NOTE — PLAN OF CARE
Outpatient Infusion • [address] • [address] • [place] [address] • [phone]      CHEMOTHERAPY EDUCATION SHEET    NAME:  Marianne Rodriguez      : 1975           DATE: 20    Booklets Given: Chemotherapy and You []  Eating Hints []    Sexuality/Fertility Books []     Chemotherapy/Biotherapy Education Sheets: (list all that apply)  Docetaxel + Gemcitabine                                                                                                                                                                Chemotherapy Regimen:  Gemcitabine Days 1 and 8 + Docetaxel Day 8 of 21 Day cycle     TOPICS EDUCATION PROVIDED EDUCATION REINFORCED COMMENTS   ANEMIA:  role of RBC, cause, s/s, ways to manage, role of transfusion [x] [] Discussed role of RBC and risk of anemia. Reviewed s/sx of anemia, including fatigue. Encouraged exercise to combat fatigue.   THROMBOCYTOPENIA:  role of platelet, cause, s/s, ways to prevent bleeding, things to avoid, when to seek help [x] [] Decreased platelets can increase risk of bleeding. Counseled on signs/symptoms of thrombocytopenia and when to call MD   NEUTROPENIA:  role of WBC, cause, infection precautions, s/s of infection, when to call MD [x] [] Low WBC can increase risk of infection. Counseled on preventing infection and to call MD if temperature reaches 100.4 or higher. Discussed appropriate hand hygiene, avoiding sick contacts, and use of neutropenic precautions when needed   NUTRITION & APPETITE CHANGES:  importance of maintaining healthy diet & weight, ways to manage to improve intake, dietary consult, exercise regimen [x] [] Discussed risk of decreased appetite, reviewed plan for small more frequent meals.   DIARRHEA:  causes, s/s of dehydration, ways to manage, dietary changes, when to call MD [x] [] Can use OTC loperamide, but call MD if 4-6 episodes in 24 hours not relieved by OTC loperamide    CONSTIPATION:  causes, ways to manage, dietary  changes, when to call MD [x] [] Discussed risk of constipation associated with antiemetic regimen. Reviewed over the counter use of stool softners and stimulants as needed should constipation present as a problem.    NAUSEA & VOMITING:  cause, use of antiemetics, dietary changes, when to call MD [x] [] Reviewed risk of N/V, counseled on prn ondansetron and to call MD if taking the max dose and unrelieved    MOUTH SORES:  causes, oral care, ways to manage [x] [] Discussed preventative measures such as salt/soda rinse, ice during infusion, use of soft brissel tooth brush, avoidance of acidic foods, and avoiding alcohol based mouth rinses    ALOPECIA:  cause, ways to manage, resources [x] [] Discussed risk of hair temporary hair loss and wasy to manage such as trimming hair to decrease shock.    INFERTILITY & SEXUALITY:  causes, fertility preservation options, sexuality changes, ways to manage, importance of birth control [x] [] Safe sex practices with two forms of birth control including condom discussed   NERVOUS SYSTEM CHANGES:  causes, s/s, neuropathies, cognitive changes, ways to manage [x] [] Discussed signs and symptoms of peripheral neuropathy, and that they are temporary. Call MD if they become longer during therapy cycles   PAIN:  causes, ways to manage [x] []    SKIN & NAIL CHANGES:  cause, s/s, ways to manage [x] [] Discussed color changes to fingernails or toenails. In extreme, but rare cases nails may fall off but generally grow back after treatment. Counseled to avoid mancures/pedicures.    ORGAN TOXICITIES:  cause, s/s, need for diagnostic tests, labs, when to notify MD [x] [] Labs will help monitor your kidney and liver while on treatment as well as your blood counts. Reviewed risk of capillary leak syndrome associated with docetaxel infusion, reviewed plan for dexamethasone 8mg PO BID on day prior, day of, and day following each infusion to reduce risk starting on Day 7 of each cycle.   SURVIVORSHIP:   distress, distress assessment, secondary malignancies, early/late effects, follow-up, social issues, social support [] []    HOME CARE:  use of spill kits, storing of PO chemo, how to manage bodily fluids [x] [] Counseled on management of soiled linens and proper flush technique.  Discussed how to manage all the side effects at home and advised when to contact the MD office.   MISCELLANEOUS:  drug interactions, administration, vesicant, et [x] [] DDI: None to discuss  Discussed frequency and length of infusion of each medication.     Referrals:        Notes:  Discussed aforementioned material with patient over the phone. All questions and concerns addressed. Provided patient with my contact information and instructions to call should additional questions arise. Will provide patient with personalized treatment calendar and written educational material during infusion appt. Education provided via telephone in an effort to practice spatial distancing and reduce transmission risk of COVID-19 for both patients and employees during the COVID-19 pandemic.

## 2020-05-21 NOTE — PROGRESS NOTES
JENNIFER met with pt during her first infusion to provide support and resources.  Pt lives in [place] and states that she has supportive friends and family.  This is her second bout with this diagnosis and it has spread.  Pt states that she knows her cancer is not curable, but she is hopeful that it will be treatable and that the chemotherapy can keep it at bay.  SW informed pt of the role of oncology social work and encouraged pt to reach out with any future needs or concerns.  JENNIFER provided a goody bag with cosmetics and other items to pt.  She was very appreciative of this.  SW available for ongoing support and resource needs.

## 2020-05-21 NOTE — PROGRESS NOTES
Oncology Nutrition Screening    Patient Name:  Marianne Rodriguez  YOB: 1975  MRN: [id number]  Date:  05/21/20  Physician:  Dr. 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     • albuterol sulfate  (90 Base) MCG/ACT inhaler INL 2 INHALATIONS PO QID PRF WHZ  0   • bisacodyl (DULCOLAX) 10 MG suppository INSERT 1 SUPPOSITORY RECTALLY 1 OR 2 TIMES A DAY FOR CONSTIPATION     • buPROPion XL (WELLBUTRIN XL) 150 MG 24 hr tablet Take  by mouth Daily.     • dexamethasone (DECADRON) 4 MG tablet Take 2 tablets oral twice a day for 3 consecutive days beginning the day before chemotherapy and continue for 6 doses. 12 tablet 5   • DULoxetine (CYMBALTA) 60 MG capsule TK ONE C PO QD  1   • gabapentin (NEURONTIN) 300 MG capsule Take 2 capsules by mouth 3 (Three) Times a Day. 180 capsule 3   • hydrOXYzine (ATARAX) 25 MG tablet TK 1 T PO QHS  1   • metoprolol tartrate (LOPRESSOR) 25 MG tablet Take 25 mg by mouth 2 (Two) Times a Day.     • ondansetron (ZOFRAN) 8 MG tablet Take 1 tablet by mouth 3 (Three) Times a Day As Needed for Nausea or Vomiting. 30 tablet 5   • oxyCODONE (ROXICODONE) 10 MG tablet Take 1-2 q6H PRN pain 60 tablet 0   • pantoprazole (PROTONIX) 40 MG EC tablet 40 mg Daily.     • promethazine (PHENERGAN) 25 MG tablet TK 1 T PO Q 6 H PRN NV     • vitamin B-12 (CYANOCOBALAMIN) 1000 MCG tablet Take 1,000 mcg by mouth Daily.  11     No current facility-administered medications for this visit.        Glycemic  "Risk:   Liz    Weight:   Height: 64 inches  Weight: 144 lbs.  Usual Body Weight: 115-120 lbs.   BMI: 24.7  Normal  Weight has increased ~30 pounds over last few months.    Oral Food Intake:  Regular Diet - No Restrictions  Compared to normal intake, current food intake is less than normal    Hydration Status:   How many 8 ounce glass of water of fluid do you drink per day?  Patient reports drinking mostly tea throughout the day.    Enteral Feeding:   n/a    Nutrition Symptoms:   Altered Apetite  Constipation  Early Satiety    Activity:   Normal with no limitations     reports that she has been smoking cigarettes. She has a 10.00 pack-year smoking history. She has never used smokeless tobacco. She reports that she does not drink alcohol or use drugs.    Evaluation of Nutritional Risk:   Patient has been identified at mild nutritional risk due to diagnosis, treatment plan, weight gain, and nutrition impact symptoms.  Met with patient during her chemotherapy infusion appointment.  Patient complains of decreased appetite and early satiety.  She reports being constipated but takes Miralax with results.  Note patient has had chemotherapy in the past at a different facility.      Discussed the importance of good nutrition during her treatment course focusing on adequate calorie, protein, nutrient and fluid intake.  Advised her to be consuming smaller more frequent meals/snacks throughout the day to aid with early satiety and potential nausea management.  Suggested she \"watch the clock\" to eat every 2-3 hours to aid with oral intake.  Emphasized the importance of protein and its role in the diet; reviewed high protein foods; and recommended she have a protein source at each meal/snack.  Also emphasized the importance of hydration; reviewed good hydrating fluid options; and recommended she drink at least 64 ounces daily.  Discussed nutritional supplements and their role in the diet and provided samples / coupons for " Boost Plus and Ensure Plus.  Also suggested she could try [place] Breakfast Essentials.  Encouraged good oral care during treatment and advised her to use the baking soda / salt mouth rinse to help prevent mouth sores and aid with potential taste changes.  Reviewed the ACS nutrition booklet and suggested she use it for symptom management if needed.  Provided written diet materials to reinforce information discussed.      Answered her questions and she voiced understanding of information discussed.  RD's contact information provided and encouraged to call with questions.  Will monitor as needed during her treatment course.    Electronically signed by:  Alana Hernandez RD  13:18

## 2020-05-22 NOTE — TELEPHONE ENCOUNTER
Patient only has 4 Oxycodone left and stated that she is still having pain. Afraid that she will run out over the holiday weekend.   Wanting to know if it is okay to have another rx sent in.    Kenzie in [facility] pharmacy.   Can call her if needed.

## 2020-05-22 NOTE — TELEPHONE ENCOUNTER
Patient requesting a refill on pain medication so she won't run out over the holiday weekend.  I talked to Dr. Mann and she said okay and signed the refill and sent.

## 2020-06-04 NOTE — PROGRESS NOTES
MULTIDISCIPLINARY CONFERENCE INITIAL TREATMENT PLAN    PRESENTER:   Charlotte Mann M.D.  DATE:  2020  SITE:  Angiosarcoma (breast)    Marianne Rodriguez  :  1975  [facility] [facility] [address]  Home phone: [phone]  Mobile phone: [phone]  Work phone: Work phone not available  MRN:  [id number]    CLINICAL HISTORY:        HPI:  45 YOWF who presented with abdominal distention with pain.         PLACE OF RESIDENCE:  [place], [place]       SOCIAL HISTORY:  Current every day smoker.         FAMILY HISTORY:  Non-Hodgkin’s lymphoma/prostate/bone cancer (father).       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       PAST SURGICAL HISTORY: Retroperitoneal mass excision with extended deep margin (2019), left breast mastectomy (2017), left breast 1 o’clock position core biopsy of mass (-017 - [facility]), LEEP (), laparoscopic cholecystectomy, radical resection of retroperitoneal tumor (2019)    IMAGIN/12/2020 ([facility]):  CT abdomen/pelvis - There are multiple, new low attenuation lesion throughout the liver consistent with metastatic disease.  The largest is in the liver dome and is 7.6 cm.  Bone destruction is seen involving the right lateral sixth rib consistent with metastatic disease.       2020 ([facility]):  PET w/CT - There is a destructive proximal right second rib lesion with maximal SUV of 3.5. There is a destructive right lateral sixth rib lesion with maximal SUV of 2.6. No hypermetabolic node or mass is seen elsewhere in the chest.  Axial images of the abdomen show a rather mildly heterogeneous appearance of liver, suggesting relatively low-level malignancy, with maximal SUV not significantly changed from prior study. For example, maximal  SUVs measuring large areas of the liver, both on this study and the prior study show maximal SUV of only 2.5. Measurement confined to the largest right lobe liver lesion again shows maximal SUV of only 2.5.     Hypermetabolic sclerotic changes in the left posterior ilium actually appears stable, although with increased activity, maximal SUV 2.6 today, 2.2 previously. Given the destructive changes in the other clearly metastatic lesions, this may be a chronic finding instead. No hypermetabolic bony disease is appreciated elsewhere.    BIOPSY/STAGING RESULTS   None Performed     CLINICAL STAGING  No matching staging information was found for the patient.    RECOMMENDED TREATMENT  Neoadjuvant therapy:    Recommended Neoadjuvant regimen and duration of therapy:   Anticipated surgical procedure:   Timing of procedure:   Surgical approach:   Anticipated procedure:   Anticipated hospital stay:   Anticipated adjuvant chemotherapy or definitive chemotherapy:   Regimen:   Duration:   Anticipated imaging:      GUIDELINE CONCORDANCE    Recommended treatment is in a concordance with National Comprehensive Cancer Network (NCCN):     If not, rationale for novel treatment recommendation is based on the following criteria:     REFERRAL SUPPORT  Surgery recommended:   Medical Oncology recommended: Gemzar/Taxol in two weeks  Radiation Oncology recommended: Palliative radiation   Clinical Trial recommended:  Genetics Consultation:   Palliative Care Consult recommended:   Barriers to Care:   Social Work Consult recommended:   Behavioral Health Consult recommended:   Notes:   Electronically signed by:  Josie Wiley  06/04/20  06:08  Privileged and Confidential Patient Safety Work Product:  The information contained herein has been compiled as part of the [facility] Patient Safety Evaluation System and is deemed to be a Patient Safety Work Product and is privileged and confidential.  Disclaimer:  Multidisciplinary cancer  conferences provide consultative services to formulate an effective treatment plan for patients and include physician representation from medical oncology, radiation oncology, radiology, surgery, and pathology.  AJCC or other appropriate staging is discussed, along with site-specific prognostic indicators and treatment planning used by evidence-based treatment guidelines.  Treatment plans which are discussed during conference may/may not necessarily be followed by the patient’s managing physician(s), as there may be other factors taken into consideration which impact the treatment plan.  The specific treatment plan is ultimately determined on an individual basis by the patient and the physician(s) involved in his/her care.

## 2020-06-09 NOTE — TELEPHONE ENCOUNTER
patient is getting low on medication      [facility] pharmacy [phone]      Patient phone # [phone]

## 2020-06-11 PROBLEM — Z45.2 ENCOUNTER FOR CENTRAL LINE CARE: Status: ACTIVE | Noted: 2020-01-01

## 2020-06-11 NOTE — PROGRESS NOTES
PROBLEM LIST:  1. Angiosarcoma of the left breast  A) presented with a palpable mass on August 2017.  Mammogram 8/14/2017 showed a 2 cm left upper outer quadrant mass.  Biopsy was positive for angiosarcoma.  A breast MRI showed a 4.3 cm vascular mass.  A PET CT was done which showed abnormal uptake in the pelvis and uterus.  She was evaluated by GYN oncology with an endometrial biopsy and MRI, all of which was benign.  She underwent a left mastectomy.  Lymph nodes were uninvolved.  She received adjuvant chemotherapy with adriamycin and ifosfamide and chest wall radiation.  B) Radical resection of retroperitoneal tumor per Dr. Luque 06/13/2019 - benign pathology  C) presented to ED May 2020 with abdominal pain and distention.  CT a/p showed mulitple liver lesions, destructive lesion right lateral sixth rib.  PET/CT 5/19/20 showed widespread liver disease with low level activity on PET, right 2nd and 6th rib lesions, left ilium lesion.  Treated with gemcitabine-taxotere started 5/21/20.  2.  Cervical dysplasia  3.  Gastroesophageal reflux disease       Subjective     CHIEF COMPLAINT: angiosarcoma of the breast      HISTORY OF PRESENT ILLNESS:   Marianne Rodriguez returns for follow-up.    She has had her first cycle of chemotherapy treatment.  She says it went okay.  She did not have much nausea.  She did feel very tired.  She is starting to lose her hair.  She had some mouth sores but was able to manage this with baking soda rinses and over-the-counter mouthwash.  She has noticed that her abdominal pain and her rib pain have both improved somewhat.    Past Medical History, Past Surgical History, Social History, Family History have been reviewed and are without significant changes except as mentioned.    Review of Systems   A comprehensive 14 point review of systems was performed and was negative except as mentioned.    Medications:  The current medication list was reviewed in the EMR    ALLERGIES:  No  "Known Allergies    Objective      /66 Comment: RUE  Pulse 60   Temp 97.5 °F (36.4 °C) (Temporal)   Resp 16   Ht 162.6 cm (64\")   Wt 62.6 kg (138 lb)   SpO2 98% Comment: RA  BMI 23.69 kg/m²      Performance Status: 0  General: well appearing female in no acute distress  Neuro: alert and oriented  HEENT: sclera anicteric, oropharynx clear  Lymphatics: no cervical, supraclavicular, or axillary adenopathy  Cardiovascular: regular rate and rhythm, no murmurs  Lungs: clear to auscultation bilaterally  Abdomen: Nondistended, nontender to palpation.  No palpable mass.  Extremeties: no lower extremity edema  Skin: no rashes, lesions, bruising, or petechiae  Psych: mood and affect appropriate    Lab Results   Component Value Date    WBC 5.70 05/28/2020    HGB 11.1 (L) 05/28/2020    HCT 34.8 05/28/2020    MCV 93.6 05/28/2020     05/28/2020     Lab Results   Component Value Date    GLUCOSE 120 (H) 05/28/2020    BUN 14 05/28/2020    CREATININE 0.55 (L) 05/28/2020    EGFRIFNONA 121 05/28/2020    BCR 25.5 (H) 05/28/2020    K 4.6 05/28/2020    CO2 23.0 05/28/2020    CALCIUM 9.1 05/28/2020    ALBUMIN 4.20 05/28/2020    AST 22 05/28/2020    ALT 34 (H) 05/28/2020           Assessment/Plan   Marianne Rodriguez is a 45 y.o. year old female  with a history of angiosarcoma of the left breast.      Clinically she is responding to treatment with improvement in her abdominal and rib pain.  She tolerated treatment fairly well with some fatigue and mouth sores.  We discussed the option of radiation to the rib lesion if her pain is not controlled but she does not feel like she needs this at this point.    Cancer related pain: Oxycodone 10 mg as needed.    I will order scans to be done prior to her next cycle to make sure that she does have evidence of response on imaging.  Follow-up in 3 weeks.              I spent 25 minutes with the patient. I spent > 50% percent of this time counseling and discussing prognosis, " diagnostic testing, evaluation, current status and management.        Charlotte Mann MD  [facility] Hematology and Oncology    6/11/2020          CC:

## 2020-06-25 NOTE — TELEPHONE ENCOUNTER
Patient called for refill on 2 RX's  Roxicodone 10 mg tablet 1-2 tablets every 6 hours  Gabapentin 300 mg capsule 2 capsules by mouth 3 times a day.  I confirmed the Pharmacy is current in the system.

## 2020-06-26 NOTE — TELEPHONE ENCOUNTER
Was able to get in touch with the patient. Advised the script was sent over to the [facility]. Discussed hands and feet with Ree, advised patient to try ice packs, it is unfortunately a side effect of the chemo. Will discuss future treatments next week at her appointment. Patient verbalized understanding.

## 2020-06-26 NOTE — TELEPHONE ENCOUNTER
Patient calling in to triage line regarding new symptoms since starting gemzar/taxotere. Patient last received 6/18. Patient is now experiencing mouth sores, non healing sores on skin, burning of the hands and feet. Discussed with Jennifer JONES, patient has been scheduled in the [facility] today at 1pm. Patient will let us know if she is not able to find a ride.

## 2020-06-26 NOTE — TELEPHONE ENCOUNTER
Patient [place] not [place] [facility] [place]. Called patient, no answer, left voicemail. Advised we have sent magic mouth wash to [facility] at [address] [place] [address]. This will help with the mouth sores until she comes next week for further evaluation.

## 2020-07-02 NOTE — PROGRESS NOTES
PROBLEM LIST:  1. Angiosarcoma of the left breast  A) presented with a palpable mass on August 2017.  Mammogram 8/14/2017 showed a 2 cm left upper outer quadrant mass.  Biopsy was positive for angiosarcoma.  A breast MRI showed a 4.3 cm vascular mass.  A PET CT was done which showed abnormal uptake in the pelvis and uterus.  She was evaluated by GYN oncology with an endometrial biopsy and MRI, all of which was benign.  She underwent a left mastectomy.  Lymph nodes were uninvolved.  She received adjuvant chemotherapy with adriamycin and ifosfamide and chest wall radiation.  B) Radical resection of retroperitoneal tumor per Dr. Luque 06/13/2019 - benign pathology  C) presented to ED May 2020 with abdominal pain and distention.  CT a/p showed mulitple liver lesions, destructive lesion right lateral sixth rib.  PET/CT 5/19/20 showed widespread liver disease with low level activity on PET, right 2nd and 6th rib lesions, left ilium lesion.  Treated with gemcitabine-taxotere started 5/21/20.  2.  Cervical dysplasia  3.  Gastroesophageal reflux disease       Subjective     CHIEF COMPLAINT: angiosarcoma of the breast      HISTORY OF PRESENT ILLNESS:   Marianne Rodriguez returns for follow-up.  She has completed 2 cycles of Gemzar and Taxotere.  With her last cycle she did have burning of her hands and feet with peeling and cracking on the hands.  She also complains of sores on her arms not healing quickly.  She had some mouth sores as well.  We prescribed Magic mouthwash but she states she was unable to afford the co-pay of $40 so she has been using baking soda and salt with warm water gargles and this has helped.  She currently has no mouth sores.  She had mild nausea and fatigue.  Her abdominal pain and rib pain are both improved.  She does have some mild bilateral lower extremity edema.    Past Medical History, Past Surgical History, Social History, Family History have been reviewed and are without significant  "changes except as mentioned.    Review of Systems   A comprehensive 14 point review of systems was performed and was negative except as mentioned.    Medications:  The current medication list was reviewed in the EMR    ALLERGIES:  No Known Allergies    Objective      /76   Pulse 90   Temp 97.5 °F (36.4 °C)   Resp 16   Ht 162.6 cm (64\")   Wt 65.8 kg (145 lb)   SpO2 99%   BMI 24.89 kg/m²    Vitals:    07/02/20 1109   PainSc: 0-No pain             Performance Status: 0  General: well appearing female in no acute distress  Neuro: alert and oriented  HEENT: sclera anicteric, oropharynx clear  Lymphatics: no cervical, supraclavicular, or axillary adenopathy  Cardiovascular: regular rate and rhythm, no murmurs  Lungs: clear to auscultation bilaterally  Abdomen: Nondistended, nontender to palpation.  No palpable mass.  Extremeties: no lower extremity edema  Skin: no rashes, lesions, bruising, or petechiae scattered scabs on bilateral arms   Psych: mood and affect appropriate    Lab Results   Component Value Date    WBC 6.30 06/18/2020    HGB 9.8 (L) 06/18/2020    HCT 31.1 (L) 06/18/2020    MCV 94.4 06/18/2020     06/18/2020     Lab Results   Component Value Date    GLUCOSE 91 06/18/2020    BUN 11 06/18/2020    CREATININE 0.57 06/18/2020    EGFRIFNONA 116 06/18/2020    BCR 19.3 06/18/2020    K 4.3 06/18/2020    CO2 25.0 06/18/2020    CALCIUM 8.5 (L) 06/18/2020    ALBUMIN 3.80 06/18/2020    AST 16 06/18/2020    ALT 21 06/18/2020       CT of Chest, abdomen and pelvis  06/30/2020  IMPRESSION:  1. Stable appearance of the chest including sclerotic expansile  involvement of right first and second rib regions along with stable  postsurgical changes left chest wall without nodular enhancement or  mass/lymph node involvement.  2. Decreased areas of involvement within the segments detailed above  liver parenchyma consistent with treatment response of hepatic  metastasis overall decreased in involvement and " specific size of lesions  from prior comparison. No ascites.  3. Stable appearance of questionable focus left iliac bone without new  osseous involvement or new metastatic involvement of the abdomen and  pelvis.           Assessment/Plan   Marianne Rodriguez is a 45 y.o. year old female  with a history of angiosarcoma of the left breast.      CT scan of the chest, abdomen and pelvis from June 30, 2020 shows stable appearance of the chest and left iliac bone.  It also shows decreased areas of involvement within the segments in the liver parenchyma consistent with treatment response of hepatic metastasis.  She did have palmar plantar erythema with some mild desquamation along with stomatitis.  I reviewed the above information with Dr. Mann and she recommends decreasing the Taxotere by 25% for the remaining treatments.  I did recommend using a thick cream for her hands and feet.  Hopefully the decrease in the Taxotere will help with the stomatitis also.    Bilateral lower extremity edema.  I recommended compression stockings and elevating legs when sitting.  I have asked her to contact us immediately if she develops any calf tenderness redness or heat.    Cancer related pain: Oxycodone 10 mg as needed.    Follow-up in 3 weeks.              I spent 25 minutes with the patient. I spent > 50% percent of this time counseling and discussing prognosis, diagnostic testing, evaluation, current status and management.        Shannon Rico APRN  [facility] Hematology and Oncology    7/2/2020          CC:

## 2020-07-09 NOTE — PROGRESS NOTES
Urine drug screen ordered.  Patient attempted to produce sample, came out with very little in the specimen cup, and said that was all she could give.  At the end of infusion, patient was asked to try to give a sample again and was given same specimen cup. After leaving bathroom, she said she still could not produce any urine.  Specimen cup appeared to be emptied out by patient, because it was empty.  Dr. Mann called and notified.

## 2020-07-09 NOTE — PROGRESS NOTES
"Patient assessed in the infusion center due to staff concerns that the patient is acting inappropriately.  Upon arrival the patient was coming out of the restroom and walking with her IV pole to the chair.  Her gait was steady.  Patient was tearful stating that she was anxious about her diagnosis and treatment.  She said she has some mild headaches occasionally that goes away with out medication.  She denies any diplopia, dizziness or lightheadedness.  She denies any recent falls.  She denies any nausea or vomiting.  She is alert and oriented x3.  I did ask her about the incident today in infusion where she was searching through the drawers at the infusion center desk.  She states her \" had lost a pink change purse and she was searching for it\".  She also states she thought her keys dropped in her chair and she was searching around for her keys as well.  She denies any confusion or visual or auditory hallucinations.  I specifically asked her if she was taking any illegal drugs said she states no.  She also denies taking any other medications that are not prescribed to her.  She said her abdominal pain has been much better lately and relieved with ibuprofen and she has not required her prescription pain medicine in several days. Dr. Mann has ordered labs and a urine drug screen.   "

## 2020-07-14 NOTE — TELEPHONE ENCOUNTER
Patient left a message said when she was here last time for infusion they were going to send over a note about getting some numbing cream for her port? Also has really bad mouth sores can't afford the magic mouthwash can something else be called in? Please call.

## 2020-07-15 NOTE — TELEPHONE ENCOUNTER
I called patient and instructed that she has refills on  Her emla cream.  Also, she can get magic mouthwash at [facility] for no cost so she will get that today.

## 2020-07-24 NOTE — TELEPHONE ENCOUNTER
Pt called left VM on Out patient Infusion states she had an emergency yesterday and missed both doctor and treatment. Wants to know when she can be added back on to see doctor and get treatment.    Please advise and give call [phone]

## 2020-07-29 NOTE — TELEPHONE ENCOUNTER
Patient is out of medication      [facility] pharmacy [phone]    Patient phone # [phone]

## 2020-07-30 NOTE — PROGRESS NOTES
PROBLEM LIST:  1. Angiosarcoma of the left breast  A) presented with a palpable mass on August 2017.  Mammogram 8/14/2017 showed a 2 cm left upper outer quadrant mass.  Biopsy was positive for angiosarcoma.  A breast MRI showed a 4.3 cm vascular mass.  A PET CT was done which showed abnormal uptake in the pelvis and uterus.  She was evaluated by GYN oncology with an endometrial biopsy and MRI, all of which was benign.  She underwent a left mastectomy.  Lymph nodes were uninvolved.  She received adjuvant chemotherapy with adriamycin and ifosfamide and chest wall radiation.  B) Radical resection of retroperitoneal tumor per Dr. Luque 06/13/2019 - benign pathology  C) presented to ED May 2020 with abdominal pain and distention.  CT a/p showed mulitple liver lesions, destructive lesion right lateral sixth rib.  PET/CT 5/19/20 showed widespread liver disease with low level activity on PET, right 2nd and 6th rib lesions, left ilium lesion.  Treated with gemcitabine-taxotere started 5/21/20.  2.  Cervical dysplasia  3.  Gastroesophageal reflux disease       Subjective     CHIEF COMPLAINT: angiosarcoma of the breast      HISTORY OF PRESENT ILLNESS:   Marianne Rodriguez returns for follow-up.  She has completed 3 cycles of gemcitabine-taxotere and is tolerating it relatively well.  The burning in her hands and feet along with peeling and cracking of her hands is much improved.  The mouth sores improved immensely with Magic mouthwash.  She had mild nausea that was relieved with Zofran.  Her fatigue was mild as well.  She continues on oxycodone for pain and uses approximately 2 to 4 tablets a day.  Today she rates her low back pain is a 2 out of 10.      Past Medical History, Past Surgical History, Social History, Family History have been reviewed and are without significant changes except as mentioned.    Review of Systems   A comprehensive 14 point review of systems was performed and was negative except as  "mentioned.    Medications:  The current medication list was reviewed in the EMR    ALLERGIES:  No Known Allergies    Objective      /67   Pulse 92   Temp 97.6 °F (36.4 °C)   Resp 18   Ht 162.6 cm (64\")   Wt 63.5 kg (140 lb)   BMI 24.03 kg/m²    Vitals:    07/30/20 1040   PainSc:   2   PainLoc: Back  Comment: lower             Performance Status: 0  General: well appearing female in no acute distress  Neuro: alert and oriented  HEENT: sclera anicteric, oropharynx clear  Lymphatics: no cervical, supraclavicular, or axillary adenopathy  Cardiovascular: regular rate and rhythm, no murmurs  Lungs: clear to auscultation bilaterally  Abdomen: Nondistended, nontender to palpation.  No palpable mass.  Extremeties: no lower extremity edema  Skin: no rashes, lesions, bruising, or petechiae scattered scabs on bilateral arms   Psych: mood and affect appropriate    Lab Results   Component Value Date    WBC 8.10 07/09/2020    HGB 10.0 (L) 07/09/2020    HCT 31.6 (L) 07/09/2020    MCV 96.2 07/09/2020     07/09/2020     Lab Results   Component Value Date    GLUCOSE 121 (H) 07/09/2020    BUN 16 07/09/2020    CREATININE 0.67 07/09/2020    EGFRIFNONA 96 07/09/2020    BCR 23.9 07/09/2020    K 4.3 07/09/2020    CO2 23.0 07/09/2020    CALCIUM 9.1 07/09/2020    ALBUMIN 4.50 07/09/2020    AST 59 (H) 07/09/2020     (H) 07/09/2020       CT of Chest, abdomen and pelvis  06/30/2020  IMPRESSION:  1. Stable appearance of the chest including sclerotic expansile  involvement of right first and second rib regions along with stable  postsurgical changes left chest wall without nodular enhancement or  mass/lymph node involvement.  2. Decreased areas of involvement within the segments detailed above  liver parenchyma consistent with treatment response of hepatic  metastasis overall decreased in involvement and specific size of lesions  from prior comparison. No ascites.  3. Stable appearance of questionable focus left iliac " bone without new  osseous involvement or new metastatic involvement of the abdomen and  pelvis.           Assessment/Plan   Marianne Rodriguez is a 45 y.o. year old female  with a history of angiosarcoma of the left breast.      Her last CT scan from June 30 showed stable appearance of the chest and left iliac bone.   It also showed decreased areas of involvement within the segments in the liver parenchyma consistent with treatment response of hepatic metastasis.      We will proceed today with cycle 4 of Gemzar Taxotere with a continued reduced dose of Taxotere 25% for the remaining treatments.    Palmar plantar erythema with no desquamation along with mild stomatitis.  Continue to use thick cream to hands and feet and Magic mouthwash 4 times a day swish and swallow as needed for stomatitis.      Cancer related pain: Oxycodone 10 mg as needed.    Follow-up in 3 weeks.  Since her symptoms are stable we will plan on repeat scans following her next cycle.              I spent 20 minutes with the patient. I spent > 50% percent of this time counseling and discussing prognosis, diagnostic testing, evaluation, current status and management.        Shannon Rico APRN  [facility] Hematology and Oncology    7/30/2020          CC:

## 2020-08-19 NOTE — PROGRESS NOTES
PROBLEM LIST:  1. Angiosarcoma of the left breast  A) presented with a palpable mass on August 2017.  Mammogram 8/14/2017 showed a 2 cm left upper outer quadrant mass.  Biopsy was positive for angiosarcoma.  A breast MRI showed a 4.3 cm vascular mass.  A PET CT was done which showed abnormal uptake in the pelvis and uterus.  She was evaluated by GYN oncology with an endometrial biopsy and MRI, all of which was benign.  She underwent a left mastectomy.  Lymph nodes were uninvolved.  She received adjuvant chemotherapy with adriamycin and ifosfamide and chest wall radiation.  B) Radical resection of retroperitoneal tumor per Dr. Luque 06/13/2019 - benign pathology  C) presented to ED May 2020 with abdominal pain and distention.  CT a/p showed mulitple liver lesions, destructive lesion right lateral sixth rib.  PET/CT 5/19/20 showed widespread liver disease with low level activity on PET, right 2nd and 6th rib lesions, left ilium lesion.  Treated with gemcitabine-taxotere started 5/21/20.  2.  Cervical dysplasia  3.  Gastroesophageal reflux disease       Subjective     CHIEF COMPLAINT: angiosarcoma of the breast      HISTORY OF PRESENT ILLNESS:   Marianne Rodriguez returns for follow-up.  She says she is feeling okay.  She feels a little better since she missed her last dose of treatment.  She said she was unable to find a ride that day and her boyfriend had been in the emergency room with an infection the night before.  She does not have any appetite and she has lost weight since her last visit.  She says that she does not have any nausea but just does not feel like eating.  She does have some mild midline abdominal pain and feels like her abdomen protrudes in that area.  She is having more neuropathy symptoms in her feet.  She feels like she is not able to walk without shoes on anymore.      Past Medical History, Past Surgical History, Social History, Family History have been reviewed and are without  "significant changes except as mentioned.    Review of Systems   A comprehensive 14 point review of systems was performed and was negative except as mentioned.    Medications:  The current medication list was reviewed in the EMR    ALLERGIES:  No Known Allergies    Objective      /74   Pulse 113   Temp 98.4 °F (36.9 °C)   Resp 16   Ht 162.6 cm (64\")   Wt 58.1 kg (128 lb) Comment: No appetite  SpO2 98%   BMI 21.97 kg/m²    Vitals:    08/20/20 0815   PainSc: 0-No pain             Performance Status: 0  General: well appearing female in no acute distress  Neuro: alert and oriented  HEENT: sclera anicteric, oropharynx clear  Lymphatics: no cervical, supraclavicular, or axillary adenopathy  Cardiovascular: regular rate and rhythm, no murmurs  Lungs: clear to auscultation bilaterally  Abdomen: Nondistended, nontender to palpation.  Midline ventral hernia  Extremeties: no lower extremity edema  Skin: no rashes, lesions, bruising, or petechiae scattered scabs on bilateral arms   Psych: mood and affect appropriate    Lab Results   Component Value Date    WBC 5.10 07/30/2020    HGB 10.0 (L) 07/30/2020    HCT 32.3 (L) 07/30/2020    MCV 99.4 (H) 07/30/2020     (H) 07/30/2020     Lab Results   Component Value Date    GLUCOSE 116 (H) 07/30/2020    BUN 11 07/30/2020    CREATININE 0.72 07/30/2020    EGFRIFNONA 88 07/30/2020    BCR 15.3 07/30/2020    K 3.6 07/30/2020    CO2 27.0 07/30/2020    CALCIUM 8.4 (L) 07/30/2020    ALBUMIN 3.60 07/30/2020    AST 21 07/30/2020    ALT 23 07/30/2020                Assessment/Plan   Marianne Rodriguez is a 45 y.o. year old female  with metastatic angiosarcoma of the left breast.      We will proceed today with cycle 5 of Gemzar Taxotere.  Given progressive neuropathy symptoms I will further reduce her Taxotere dose to 60%.  I will plan to repeat imaging after this cycle.  We discussed that at some point the cancer will start to grow again despite this treatment and we will " have to consider other options at that time.    Palmar plantar erythema with no desquamation along with mild stomatitis.  Continue to use thick cream to hands and feet.    Ventral hernia: We will give her a prescription for an abdominal binder.    Cancer related pain: Oxycodone 10 mg as needed.    Follow-up in 3 weeks.                I spent 25 minutes with the patient. I spent > 50% percent of this time counseling and discussing prognosis, diagnostic testing, evaluation, current status and management.        Charlotte Mann MD  [facility]    8/20/2020          CC:

## 2020-08-20 NOTE — PROGRESS NOTES
"Onc Nutrition    Patient: Marianne Rodriguez  YOB: 1975    Weight: 128# / 12# (8.6%) weight loss x 3 weeks  Nutrition Symptoms: decreased appetite, taste changes, chronic constipation    Follow up with patient during her infusion appointment.  Patient has been identified at severe chronic disease related malnutrition due to insufficient energy intake and significant weight loss.  She reports having no appetite recently which has resulted in poor oral intake and weight loss as above.  She complains of taste changes as well.  She also states her constipation seems to be a little worse and will take Miralax occasionally.      Encouraged her to be eating smaller more frequent snacks throughout the day and to \"watch the clock\" or set reminders/alarms to eat every 2-3 hours.  Advised her to be choosing high calorie / protein foods to aid with weight maintenance.  Instructed her to use the baking soda / salt mouth rinse before and after eating to aid with taste changes and overall oral care.  Discussed ONS and their role in the diet.  Provided samples of Boost Breeze and Boost Soothe and suggested she try [place] Breakfast Essentials.  Suggested she take a stool softener to aid with constipation management.  Provided and reviewed written diet materials to reinforce information discussed.    Answered her questions and she voiced understanding of information discussed.  Encouraged to call RD with questions.  Will monitor as needed during her treatment course.    Alana Hernandez RD  08/20/20          "

## 2020-08-21 NOTE — TELEPHONE ENCOUNTER
----- Message from Anne-Marie Yun RN sent at 8/21/2020  3:33 PM EDT -----  Regarding: RYANN/Patient had 2 pm appt, still not here for infusion  Patient called and said she would be running late to her infusion appt. Her appt was at 2pm, patient still not here at 1534. Just wanted to let you know, thanks Anne-Marie [phone]

## 2020-08-21 NOTE — TELEPHONE ENCOUNTER
I called patient at 4p and she was in [facility].  I told her it was very important for her to drink extra fluids this weekend and we needed to check her labs again this coming week and schedule her for some fluids.  Patient verbalized understanding and apologized for not making it in today.

## 2020-09-10 NOTE — TELEPHONE ENCOUNTER
Patient left a voicemail that she didn't make her appointment and chemo today due to some female trauma she wants a call back to get this rescheduled. She also wants some results. Please call.

## 2020-09-10 NOTE — TELEPHONE ENCOUNTER
I called patient and she had a fight with her boyfriend this morning and could not get to her appt.   She was anxious to hear about her scan results.  I talked with Dr. Mann and she said scans were stable and I could relay that info and reschedule patient for APRN and treatment next week.  I told patient Shannon in scheduling would call her with new times/dates.

## 2020-09-17 NOTE — TELEPHONE ENCOUNTER
[phone] OM w/my number to return [phone].  [phone] (Nelia) Mother's home number w/NA/NM  [phone] (Nelia) Mother's cell number w/NA/NM  [id number]  ~Shell

## 2020-09-21 NOTE — TELEPHONE ENCOUNTER
Patient has missed appts on Sept 10 and Sept 17 - and she has called today asking for refill on narcotics and to reschedule appt.  Pt has been put on the schedule for 9/24 but I called and left a msg that we would not be refilling narcotic until we see her on Thursday.  She will be in infusion.  I had to leave voicemail as she did not answer the phone.

## 2020-09-21 NOTE — TELEPHONE ENCOUNTER
Pt called states she had to call the police on her boyfriend missed last weeks chemo and needs to reschedule  She also states she needs her pain meds refilled at [facility]     Please advise and give call [phone] (M)

## 2020-09-25 NOTE — TELEPHONE ENCOUNTER
"I called patient today to tell her that Dr Mann had reviewed her uds and it was positive for meth/amph and negative for oxycodone.  The patient stated \"I don't know how that could be\"  But with further conversation, the patient was quiet and was able to listen to conversation.  I also pointed out that the urine drug screen did not show the medication that we prescribe for pain control and she said that she doesn't take it that often.  I told her we were setting boundaries and we would not be able to prescribe narcotic pain control as long as she had positive uds.  I told her we would be randomly collecting urine specimens and she verbalized understanding.  I advised that she attend her scheduled appointments. I told her that we want to continue to treat her cancer as she is definitely having some improvement in disease and she agreed.  She was grateful at the end of the call.    "

## 2020-10-01 NOTE — TELEPHONE ENCOUNTER
I called and left the patient a voicemail that infusion said she did not show up for chemo.  I left a msg about the need to be compliant with treatments and appts and asked patient to call me back.   Patient did not answer phone and I had to leave msg.

## 2020-10-15 NOTE — PROGRESS NOTES
PROBLEM LIST:  1. Angiosarcoma of the left breast  A) presented with a palpable mass on August 2017.  Mammogram 8/14/2017 showed a 2 cm left upper outer quadrant mass.  Biopsy was positive for angiosarcoma.  A breast MRI showed a 4.3 cm vascular mass.  A PET CT was done which showed abnormal uptake in the pelvis and uterus.  She was evaluated by GYN oncology with an endometrial biopsy and MRI, all of which was benign.  She underwent a left mastectomy.  Lymph nodes were uninvolved.  She received adjuvant chemotherapy with adriamycin and ifosfamide and chest wall radiation.  B) Radical resection of retroperitoneal tumor per Dr. Luque 06/13/2019 - benign pathology  C) presented to ED May 2020 with abdominal pain and distention.  CT a/p showed mulitple liver lesions, destructive lesion right lateral sixth rib.  PET/CT 5/19/20 showed widespread liver disease with low level activity on PET, right 2nd and 6th rib lesions, left ilium lesion.  Treated with gemcitabine-taxotere started 5/21/20.  2.  Cervical dysplasia  3.  Gastroesophageal reflux disease       Subjective     CHIEF COMPLAINT: angiosarcoma of the breast      HISTORY OF PRESENT ILLNESS:   Marianne Rodriguez returns for follow-up.  She says she is doing okay.  She says she has had some issues at home but thinks this has gotten sorted out for the most part.  She occasionally has some abdominal pain.  She also reports some numbness on her right thigh which she has had ever since the retroperitoneal surgery in 2019.  Otherwise no complaints.      Past Medical History, Past Surgical History, Social History, Family History have been reviewed and are without significant changes except as mentioned.    Review of Systems   A comprehensive 14 point review of systems was performed and was negative except as mentioned.    Medications:  The current medication list was reviewed in the EMR    ALLERGIES:  No Known Allergies    Objective      /70   Pulse 91    "Temp 96 °F (35.6 °C) (Temporal)   Resp 18   Ht 162.6 cm (64.02\")   Wt 58.5 kg (129 lb)   SpO2 98%   BMI 22.13 kg/m²    Vitals:    10/15/20 1034   PainSc:   3             Performance Status: 0  General: well appearing female in no acute distress  Neuro: alert and oriented  HEENT: sclera anicteric, oropharynx clear  Lymphatics: no cervical, supraclavicular, or axillary adenopathy  Cardiovascular: regular rate and rhythm, no murmurs  Lungs: clear to auscultation bilaterally  Abdomen: Nondistended, nontender to palpation.    Extremeties: no lower extremity edema  Skin: no rashes, lesions, bruising, or petechiae scattered scabs on bilateral arms   Psych: mood and affect appropriate    Lab Results   Component Value Date    WBC 6.50 09/24/2020    HGB 11.0 (L) 09/24/2020    HCT 33.9 (L) 09/24/2020    .1 (H) 09/24/2020     09/24/2020     Lab Results   Component Value Date    GLUCOSE 89 09/24/2020    BUN 14 09/24/2020    CREATININE 0.63 09/24/2020    EGFRIFNONA 103 09/24/2020    BCR 22.2 09/24/2020    K 4.0 09/24/2020    CO2 29.0 09/24/2020    CALCIUM 9.0 09/24/2020    ALBUMIN 3.80 09/24/2020    AST 62 (H) 09/24/2020    ALT 60 (H) 09/24/2020                Assessment/Plan   Marianne Rodriguez is a 45 y.o. year old female  with metastatic angiosarcoma of the left breast.      We will proceed today with day 8 cycle 6 of Gemzar and Taxotere.  We will continue with a 60% dose of Taxotere given her neuropathy symptoms.  She reports these are currently stable.    I would plan to repeat imaging again after cycle 8.    Follow-up in 2 weeks to begin cycle 7.              I spent 15 minutes with the patient. I spent > 50% percent of this time counseling and discussing prognosis, diagnostic testing, evaluation, current status and management.        Charlotte Mann MD  [facility]    10/15/2020          CC:          "

## 2020-10-16 NOTE — TELEPHONE ENCOUNTER
Patient uds showed positive for methamphetamine and the patient was instructed a few weeks ago that if she continued to have positive drug screens, Dr. Mann would not be able to prescribe pain medications.

## 2020-10-29 NOTE — TELEPHONE ENCOUNTER
Pt may be late for appts today @ 2p. She has to  mother from work and will call if she thinks she'll be later than 15 mins.    [phone] PH

## 2020-11-05 NOTE — PROGRESS NOTES
PROBLEM LIST:  1. Angiosarcoma of the left breast  A) presented with a palpable mass on August 2017.  Mammogram 8/14/2017 showed a 2 cm left upper outer quadrant mass.  Biopsy was positive for angiosarcoma.  A breast MRI showed a 4.3 cm vascular mass.  A PET CT was done which showed abnormal uptake in the pelvis and uterus.  She was evaluated by GYN oncology with an endometrial biopsy and MRI, all of which was benign.  She underwent a left mastectomy.  Lymph nodes were uninvolved.  She received adjuvant chemotherapy with adriamycin and ifosfamide and chest wall radiation.  B) Radical resection of retroperitoneal tumor per Dr. Luque 06/13/2019 - benign pathology  C) presented to ED May 2020 with abdominal pain and distention.  CT a/p showed mulitple liver lesions, destructive lesion right lateral sixth rib.  PET/CT 5/19/20 showed widespread liver disease with low level activity on PET, right 2nd and 6th rib lesions, left ilium lesion.  Treated with gemcitabine-taxotere started 5/21/20.  2.  Cervical dysplasia  3.  Gastroesophageal reflux disease       Subjective     CHIEF COMPLAINT: angiosarcoma of the breast      HISTORY OF PRESENT ILLNESS:   Marianne Rodriguez returns for follow-up. She is here for cycle 7 day 1 of gemcitabine-taxotere. She reports over the past week she has had new onset right sided mid abdominal pain. She currently rates the pain a 3 out of 10. She states the pain is worse with turning or leaning to the right and with palpation. She states the pain is sharp and intermittent. She continues to have constant peripheral neuropathy in her feet and intermittent numbness in her fingers. She denies any nausea or vomiting. No fevers, cough or shortness of air.         Past Medical History, Past Surgical History, Social History, Family History have been reviewed and are without significant changes except as mentioned.    Review of Systems   A comprehensive 14 point review of systems was  "performed and was negative except as mentioned.    Medications:  The current medication list was reviewed in the EMR    ALLERGIES:  No Known Allergies    Objective      /63   Pulse 99   Temp 97.8 °F (36.6 °C)   Resp 18   Ht 162.6 cm (64\")   Wt 60.3 kg (133 lb)   SpO2 100%   BMI 22.83 kg/m²    Vitals:    11/05/20 0837   PainSc: 3  Comment: Pain = 3 mid abdomen x1 week             Performance Status: 0  General: well appearing female in no acute distress  Neuro: alert and oriented  HEENT: sclera anicteric, oropharynx clear  Lymphatics: no cervical, supraclavicular, or axillary adenopathy  Cardiovascular: regular rate and rhythm, no murmurs  Lungs: clear to auscultation bilaterally  Abdomen: Nondistended, nontender to palpation.    Extremeties: no lower extremity edema  Skin: no rashes, lesions, bruising, or petechiae.  scattered scabs on bilateral arms   Psych: mood and affect appropriate    Lab Results   Component Value Date    WBC 12.40 (H) 10/15/2020    HGB 13.2 10/15/2020    HCT 41.4 10/15/2020    .2 (H) 10/15/2020     10/15/2020     Lab Results   Component Value Date    GLUCOSE 75 10/15/2020    BUN 16 10/15/2020    CREATININE 2.31 (H) 10/15/2020    EGFRIFNONA 23 (L) 10/15/2020    BCR 6.9 (L) 10/15/2020    K 4.4 10/15/2020    CO2 26.0 10/15/2020    CALCIUM 9.3 10/15/2020    ALBUMIN 4.40 10/15/2020    AST 21 10/15/2020    ALT 25 10/15/2020                Assessment/Plan   Marianne Rodriguez is a 45 y.o. year old female  with metastatic angiosarcoma of the left breast.      We will proceed today with cycle 7 day 1 of Gemzar and Taxotere.  We will continue with a 60% dose of Taxotere given her neuropathy symptoms.  Since she is having new onset right mid abdominal pain we will plan on Ct scans of chest, abdomen and pelvis next week. I will not use contrast with scans since her creatinine 10/15/2020 was 2.31.       Follow-up in 3 weeks to begin cycle 8. I will contact her with scan results " when available.               I spent 25 minutes with the patient. I spent > 50% percent of this time counseling and discussing prognosis, diagnostic testing, evaluation, current status and management.        Shannon Rico APRN  [facility]    11/5/2020          CC:

## 2020-11-11 NOTE — TELEPHONE ENCOUNTER
Pt is completely out and needs refill today, if possible. Prescription should go to [facility] pharmacy on file.    [phone] PH

## 2020-11-11 NOTE — TELEPHONE ENCOUNTER
Patient stated she was out of oxycodone and needed refill.  I called her and explained while she continues with illicit drug screens we cannot prescribe narcotoc medications as it is a medical liability and dangerous for her.  She did verbalize understanding.

## 2020-11-11 NOTE — TELEPHONE ENCOUNTER
Patient continues with illicit uds - informed patient that Dr. Mann would not prescribe narcotics while uds were positive

## 2020-11-18 NOTE — PROGRESS NOTES
"      PROBLEM LIST:  1. Angiosarcoma of the left breast  A) presented with a palpable mass on August 2017.  Mammogram 8/14/2017 showed a 2 cm left upper outer quadrant mass.  Biopsy was positive for angiosarcoma.  A breast MRI showed a 4.3 cm vascular mass.  A PET CT was done which showed abnormal uptake in the pelvis and uterus.  She was evaluated by GYN oncology with an endometrial biopsy and MRI, all of which was benign.  She underwent a left mastectomy.  Lymph nodes were uninvolved.  She received adjuvant chemotherapy with adriamycin and ifosfamide and chest wall radiation.  B) Radical resection of retroperitoneal tumor per Dr. Luque 06/13/2019 - benign pathology  C) presented to ED May 2020 with abdominal pain and distention.  CT a/p showed mulitple liver lesions, destructive lesion right lateral sixth rib.  PET/CT 5/19/20 showed widespread liver disease with low level activity on PET, right 2nd and 6th rib lesions, left ilium lesion.  Treated with gemcitabine-taxotere started 5/21/20.  2.  Cervical dysplasia  3.  Gastroesophageal reflux disease       Subjective     CHIEF COMPLAINT: angiosarcoma of the breast      HISTORY OF PRESENT ILLNESS:   Marianne Rodriguez returns for follow-up.  A scan was ordered last week because she has been having increasing right sided abdominal pain.  She says she is taking ibuprofen for this but it does not completely control her symptoms.  Otherwise she is doing okay.        Past Medical History, Past Surgical History, Social History, Family History have been reviewed and are without significant changes except as mentioned.    Review of Systems   A comprehensive 14 point review of systems was performed and was negative except as mentioned.    Medications:  The current medication list was reviewed in the EMR    ALLERGIES:  No Known Allergies    Objective      /65   Pulse 71   Temp 97.2 °F (36.2 °C)   Resp 16   Ht 162.6 cm (64\")   Wt 62.1 kg (137 lb)   SpO2 98%   " BMI 23.52 kg/m²    Vitals:    11/18/20 1500   PainSc: 0-No pain  Comment: No new pain. Old back & abd pain = 5             Performance Status: 0  General: well appearing female in no acute distress  Neuro: alert and oriented  HEENT: sclera anicteric, oropharynx clear  Lymphatics: no cervical, supraclavicular, or axillary adenopathy  Cardiovascular: regular rate and rhythm, no murmurs  Lungs: clear to auscultation bilaterally  Abdomen: Nondistended, mild upper abdominal tenderness to palpation  Extremeties: no lower extremity edema  Skin: no rashes, lesions, bruising, or petechiae.    Psych: mood and affect appropriate    Lab Results   Component Value Date    WBC 6.50 11/05/2020    HGB 10.9 (L) 11/05/2020    HCT 34.4 11/05/2020    MCV 98.6 (H) 11/05/2020     11/05/2020     Lab Results   Component Value Date    GLUCOSE 109 (H) 11/05/2020    BUN 11 11/05/2020    CREATININE 0.70 11/05/2020    EGFRIFNONA 116 11/05/2020    BCR 19.3 11/05/2020    K 3.8 11/05/2020    CO2 28.0 11/05/2020    CALCIUM 9.1 11/05/2020    ALBUMIN 4.00 11/05/2020    AST 24 11/05/2020    ALT 28 11/05/2020            CT Chest Without Contrast  Narrative: EXAMINATION: CT CHEST WO CONTRAST-, CT ABDOMEN AND PELVIS WO  CONTRAST-11/13/2020:      INDICATION: Metastatic sarcoma; C50.912-Malignant neoplasm of  unspecified site of left female breast, history of breast cancer.     TECHNIQUE: Multiple axial CT imaging was obtained of the chest, abdomen  and pelvis without the administration of intravenous contrast.     The radiation dose reduction device was turned on for each scan per the  ALARA (As Low as Reasonably Achievable) protocol.     COMPARISON: 09/09/2020.     FINDINGS:      CHEST:  The thyroid is homogeneous. Port-A-Catheter identified on the  right. Abnormal appearance of the right second rib posteriorly and  laterally with abnormal surrounding soft tissue. The appearance of the  rib and the soft tissue is stable and unchanged when compared  to the  prior examination. No progression of disease. The remainder of the bony  structures are revealing abnormality within the lateral right sixth rib  with associated soft tissue swelling. There is no definite rib  abnormality identified on the left. There are degenerative changes  identified throughout the spine. The lung parenchyma is clear. Calcified  granuloma identified in the periphery of the right upper lobe  posteriorly. No mediastinal mass or adenopathy. The cardiac chambers are  within normal limits. No pericardial effusion. No bulky hilar or  axillary lymphadenopathy.     ABDOMEN: There are diffuse low-density areas identified throughout the  liver. These areas have appeared to progress when compared to the prior  examination. For example, the lesion identified filling the dome of the  liver today measures largest axial dimension 11.1 x 9.3 cm and  previously measured 8.7 x 7.1 cm. Multiple low-density lesions are seen  diffusely throughout the liver. The spleen reveals a low-density lesion  concerning for metastasis that is also increased in size in the interval  for example, today this lesion measures 3.9 cm and previously this area  measured 2.3 cm. Both kidneys are unremarkable. Pancreas is homogeneous.  No abdominal or retroperitoneal lymphadenopathy. Ventral abdominal wall  hernia containing colon and fat with no obvious obstruction.     PELVIS: The pelvic organs are unremarkable. The pelvic portions of the  gastrointestinal tract are within normal limits. No free fluid or free  air. No abnormal mass or fluid collection is identified. The bony  structures reveal degenerative changes identified throughout the spine.  There are several bony lesions identified. The findings are stable.     Impression: Stable metastasis within the bony structures. There is  progression of disease within the liver and spleen with increase seen in  the size of the lesions and disease.     D:  11/13/2020  E:   11/13/2020     This report was finalized on 11/13/2020 5:39 PM by Dr. Alana López MD.     CT Abdomen Pelvis Without Contrast  Narrative: EXAMINATION: CT CHEST WO CONTRAST-, CT ABDOMEN AND PELVIS WO  CONTRAST-11/13/2020:      INDICATION: Metastatic sarcoma; C50.912-Malignant neoplasm of  unspecified site of left female breast, history of breast cancer.     TECHNIQUE: Multiple axial CT imaging was obtained of the chest, abdomen  and pelvis without the administration of intravenous contrast.     The radiation dose reduction device was turned on for each scan per the  ALARA (As Low as Reasonably Achievable) protocol.     COMPARISON: 09/09/2020.     FINDINGS:      CHEST:  The thyroid is homogeneous. Port-A-Catheter identified on the  right. Abnormal appearance of the right second rib posteriorly and  laterally with abnormal surrounding soft tissue. The appearance of the  rib and the soft tissue is stable and unchanged when compared to the  prior examination. No progression of disease. The remainder of the bony  structures are revealing abnormality within the lateral right sixth rib  with associated soft tissue swelling. There is no definite rib  abnormality identified on the left. There are degenerative changes  identified throughout the spine. The lung parenchyma is clear. Calcified  granuloma identified in the periphery of the right upper lobe  posteriorly. No mediastinal mass or adenopathy. The cardiac chambers are  within normal limits. No pericardial effusion. No bulky hilar or  axillary lymphadenopathy.     ABDOMEN: There are diffuse low-density areas identified throughout the  liver. These areas have appeared to progress when compared to the prior  examination. For example, the lesion identified filling the dome of the  liver today measures largest axial dimension 11.1 x 9.3 cm and  previously measured 8.7 x 7.1 cm. Multiple low-density lesions are seen  diffusely throughout the liver. The spleen reveals a  low-density lesion  concerning for metastasis that is also increased in size in the interval  for example, today this lesion measures 3.9 cm and previously this area  measured 2.3 cm. Both kidneys are unremarkable. Pancreas is homogeneous.  No abdominal or retroperitoneal lymphadenopathy. Ventral abdominal wall  hernia containing colon and fat with no obvious obstruction.     PELVIS: The pelvic organs are unremarkable. The pelvic portions of the  gastrointestinal tract are within normal limits. No free fluid or free  air. No abnormal mass or fluid collection is identified. The bony  structures reveal degenerative changes identified throughout the spine.  There are several bony lesions identified. The findings are stable.     Impression: Stable metastasis within the bony structures. There is  progression of disease within the liver and spleen with increase seen in  the size of the lesions and disease.     D:  11/13/2020  E:  11/13/2020     This report was finalized on 11/13/2020 5:39 PM by Dr. Alana López MD.       I personally reviewed the imaging studies.    Assessment/Plan   Marianne Rodriguez is a 45 y.o. year old female  with metastatic angiosarcoma of the left breast.      She had an initial good response to Taxotere and Gemzar, but now shows evidence of progression.  She has previously received Adriamycin as part of her adjuvant therapy.  We discussed treatment with trabectedin.  We discussed potential side effects of this including cytopenias, nausea, rash, fatigue, hair loss.    We will plan to start treatment in the next week or so.    She is having cancer related pain is increased likely related to progression.  I wrote a prescription for oxycodone 10 mg, 30 pills today.  We discussed that we will be doing drug screens to make sure that she is using this appropriately and not using any other substances.  She understands that we are not able to prescribe narcotic medicines for her if she continues  to have positive drug screens.  However given that her tumor has progressed I am giving her another opportunity on this.    Follow-up with cycle 2.            I spent 25 minutes with the patient. I spent > 50% percent of this time counseling and discussing prognosis, diagnostic testing, evaluation, current status and management.        Charlotte Mann MD  [facility]    11/18/2020          CC:

## 2020-11-23 NOTE — TELEPHONE ENCOUNTER
Called and left patient voicemail that we changed her appt time tomorrow to 1pm from 8am.  Still waiting on insurance approval as well.   Patient did not answer but left msg.

## 2020-11-24 NOTE — TELEPHONE ENCOUNTER
I received a call from infusion and auth that patients treatment still not authed by insurance. I informed Dr. Mann and rescheduled patient for next week - Wed. Dec. 2 at 1:30pm.  I called patient and spoke to her and gave her the new date and time.  Patient verbalized understanding.

## 2020-12-01 NOTE — TELEPHONE ENCOUNTER
Unable to Authorized Yondelis drug for patient. Called and left message for patient to call back to [facility] regarding rescheduling her infusion appointment.

## 2020-12-02 PROBLEM — C49.9 ANGIOSARCOMA (HCC): Status: ACTIVE | Noted: 2020-01-01

## 2020-12-04 NOTE — TELEPHONE ENCOUNTER
I talked with Dr. Mann and she ordered for patient to get a urine drug screen before she would consider writing a refill.  I called the patient and asked her to come to our lab and she verbalized understanding.

## 2020-12-04 NOTE — TELEPHONE ENCOUNTER
Caller: PT    Relationship: PT    Best call back number: [phone]    Medication needed:   oxyCODONE (ROXICODONE) 10 MG tablet [[id number]]    When do you need the refill by:TODAY      Does the patient have less than a 3 day supply:  [x] Yes  [] No    What is the patient's preferred pharmacy:        Pharmacy:  [id number] - [place], [place] - [address] [place] [address] AT [facility] & [facility] - [phone]  - [phone] FX  Phone:  [phone]  Fax:  [phone]  Address:  [address] [place] [id number]

## 2020-12-07 NOTE — TELEPHONE ENCOUNTER
Caller: PT    Relationship to patient: PT    Best call back number: [phone]    PT NOTIFYING OFFICE URINE ANALYSIS HAS BEEN COMPLETED TODAY.

## 2020-12-07 NOTE — TELEPHONE ENCOUNTER
Per Vaishnavi, auth will not be approved by Wednesday.  Need to push to next week.  Patient given new appointments for next Monday.

## 2020-12-14 ENCOUNTER — APPOINTMENT (OUTPATIENT)
Dept: ONCOLOGY | Facility: HOSPITAL | Age: 45
End: 2020-12-14

## 2020-12-14 ENCOUNTER — HOSPITAL ENCOUNTER (OUTPATIENT)
Dept: ONCOLOGY | Facility: HOSPITAL | Age: 45
Setting detail: INFUSION SERIES
End: 2020-12-14

## 2024-11-17 NOTE — TELEPHONE ENCOUNTER
Received notification from Shannon Wei that insurance was requiring patient to sign a form authorizing us to act on her behalf to appeal denial for yondelis.  Patient notified.  States she will try to stop by today.  Form at  awaiting signature.   *If evident, report to MD immediately and reconsult this dept./change of breathing pattern/cough/gurgly voice/fever/pneumonia/throat clearing/upper respiratory infection

## (undated) DEVICE — 4-PORT MANIFOLD: Brand: NEPTUNE 2

## (undated) DEVICE — TOTAL TRAY, 16FR 10ML SIL FOLEY, URN: Brand: MEDLINE

## (undated) DEVICE — GLV SURG BIOGEL LTX PF 7 1/2

## (undated) DEVICE — ULTRACLEAN ACCESSORY ELECTRODE 4" (10.16 CM) COATED BLADE: Brand: ULTRACLEAN

## (undated) DEVICE — SAFESECURE,SECUREMENT,FOLEY CATH,STERILE: Brand: MEDLINE

## (undated) DEVICE — DBD-DRAPE,LAP,CHOLE,W/TROUGHS,STERILE: Brand: MEDLINE

## (undated) DEVICE — POOLE SUCTION HANDLE: Brand: CARDINAL HEALTH

## (undated) DEVICE — 3M™ IOBAN™ 2 ANTIMICROBIAL INCISE DRAPE 6650EZ: Brand: IOBAN™ 2

## (undated) DEVICE — SUT SILK 3/0 TIES 18IN A184H

## (undated) DEVICE — SKIN AFFIX SURG ADHESIVE 72/CS 0.55ML: Brand: MEDLINE

## (undated) DEVICE — SUT PDS 1 CTX 36IN Z371T

## (undated) DEVICE — SINGLE PORT MANIFOLD: Brand: NEPTUNE 2

## (undated) DEVICE — CLTH CLENS READYCLEANSE PERI CARE PK/5

## (undated) DEVICE — SUT SILK 2/0 TIES 18IN A185H

## (undated) DEVICE — LEX BASIC NO DRAPE: Brand: MEDLINE INDUSTRIES, INC.

## (undated) DEVICE — COVER,LIGHT HANDLE,FLX,1/PK: Brand: MEDLINE INDUSTRIES, INC.

## (undated) DEVICE — GOWN,PREVENTION PLUS,XXLARGE,STERILE: Brand: MEDLINE

## (undated) DEVICE — ENCORE® LATEX MICRO SIZE 8, STERILE LATEX POWDER-FREE SURGICAL GLOVE: Brand: ENCORE